# Patient Record
Sex: FEMALE | Race: WHITE
[De-identification: names, ages, dates, MRNs, and addresses within clinical notes are randomized per-mention and may not be internally consistent; named-entity substitution may affect disease eponyms.]

---

## 2017-03-09 DIAGNOSIS — I10 ESSENTIAL HYPERTENSION: Primary | ICD-10-CM

## 2017-03-09 RX ORDER — LISINOPRIL 20 MG/1
TABLET ORAL
Qty: 30 TABLET | Refills: 3 | Status: SHIPPED | OUTPATIENT
Start: 2017-03-09 | End: 2017-06-15

## 2017-04-12 ENCOUNTER — PRIOR ORIGINAL RECORDS (OUTPATIENT)
Dept: OTHER | Age: 47
End: 2017-04-12

## 2017-06-15 ENCOUNTER — OFFICE VISIT (OUTPATIENT)
Dept: FAMILY MEDICINE CLINIC | Facility: CLINIC | Age: 47
End: 2017-06-15

## 2017-06-15 VITALS
HEART RATE: 84 BPM | RESPIRATION RATE: 14 BRPM | DIASTOLIC BLOOD PRESSURE: 80 MMHG | HEIGHT: 62.8 IN | BODY MASS INDEX: 30.77 KG/M2 | SYSTOLIC BLOOD PRESSURE: 134 MMHG | WEIGHT: 173.63 LBS

## 2017-06-15 DIAGNOSIS — I10 ESSENTIAL HYPERTENSION, BENIGN: Primary | ICD-10-CM

## 2017-06-15 DIAGNOSIS — Z12.31 VISIT FOR SCREENING MAMMOGRAM: ICD-10-CM

## 2017-06-15 DIAGNOSIS — Z00.00 LABORATORY EXAMINATION ORDERED AS PART OF A ROUTINE GENERAL MEDICAL EXAMINATION: ICD-10-CM

## 2017-06-15 PROCEDURE — 99213 OFFICE O/P EST LOW 20 MIN: CPT | Performed by: FAMILY MEDICINE

## 2017-06-15 RX ORDER — LISINOPRIL 20 MG/1
20 TABLET ORAL
Qty: 90 TABLET | Refills: 3 | Status: SHIPPED | OUTPATIENT
Start: 2017-06-15 | End: 2018-06-22

## 2017-06-15 NOTE — PROGRESS NOTES
HPI:   Patient presents with:  Blood Pressure: 6 month check      Ibis Hughes is a 48year old female who presents for recheck of her hypertension. She has been taking medications as instructed, with no medication side effects.  Her home BP monitoring i and hysteroscopy. Her family history includes Cancer in her maternal grandfather; Heart Disease in her paternal grandmother; Stroke in her paternal grandfather; abdominal cancer in her father; adrenal cancer in her mother.      She is allergic to [facility] DIFFERENTIAL WITH PLATELET     TSH W REFLEX TO FREE T4     Visit for screening mammogram         Relevant Orders     MARINO SCREENING BILAT (CPT=77067)            Return in about 6 months (around 12/15/2017) for Annual physical.    Jayson Mata, 6/15/2017  9:4

## 2017-07-03 ENCOUNTER — OFFICE VISIT (OUTPATIENT)
Dept: FAMILY MEDICINE CLINIC | Facility: CLINIC | Age: 47
End: 2017-07-03

## 2017-07-03 ENCOUNTER — HOSPITAL ENCOUNTER (OUTPATIENT)
Dept: GENERAL RADIOLOGY | Age: 47
Discharge: HOME | End: 2017-07-03
Attending: PHYSICIAN ASSISTANT
Payer: COMMERCIAL

## 2017-07-03 VITALS
BODY MASS INDEX: 31 KG/M2 | WEIGHT: 173.81 LBS | HEART RATE: 84 BPM | SYSTOLIC BLOOD PRESSURE: 140 MMHG | DIASTOLIC BLOOD PRESSURE: 80 MMHG

## 2017-07-03 DIAGNOSIS — M25.511 ACUTE PAIN OF RIGHT SHOULDER: Primary | ICD-10-CM

## 2017-07-03 DIAGNOSIS — M25.511 ACUTE PAIN OF RIGHT SHOULDER: ICD-10-CM

## 2017-07-03 PROCEDURE — 99213 OFFICE O/P EST LOW 20 MIN: CPT | Performed by: PHYSICIAN ASSISTANT

## 2017-07-03 PROCEDURE — 73030 X-RAY EXAM OF SHOULDER: CPT | Performed by: PHYSICIAN ASSISTANT

## 2017-07-03 NOTE — PROGRESS NOTES
CC:  Patient presents with:  Arm Pain: at Shoulder- was riding horse on vacation - pulled arm trying to hold on to get on harness- can hardly lift right arm- 06/30/17      HPI: Lauren Jj presents for complaints of right shoulder pain and diminished ROM after Normal respirations; no cough, SOB, or wheezing  Cardiovascular: No CP or palpitations; no peripheral edema  Gastrointestinal: Normal bowels; no abdominal pain; no N/V/D/C  Genitourinary:  Normal urination; no hematuria; no frequency  Musculoskeletal: See complications, allergies, or worsening or changing symptoms. Patient is to call with any side effects or complications from the treatments as a result of today.     Reviewed Past Medical History and   Patient Active Problem List:     Essential hypertension

## 2017-07-17 ENCOUNTER — OFFICE VISIT (OUTPATIENT)
Dept: FAMILY MEDICINE CLINIC | Facility: CLINIC | Age: 47
End: 2017-07-17

## 2017-07-17 VITALS
WEIGHT: 175 LBS | BODY MASS INDEX: 31.4 KG/M2 | HEART RATE: 88 BPM | HEIGHT: 62.5 IN | SYSTOLIC BLOOD PRESSURE: 138 MMHG | DIASTOLIC BLOOD PRESSURE: 60 MMHG

## 2017-07-17 DIAGNOSIS — M75.41 IMPINGEMENT SYNDROME OF RIGHT SHOULDER: Primary | ICD-10-CM

## 2017-07-17 PROCEDURE — 99213 OFFICE O/P EST LOW 20 MIN: CPT | Performed by: PHYSICIAN ASSISTANT

## 2017-07-17 NOTE — PROGRESS NOTES
CC:  Patient presents with:  Shoulder Pain: follow up on right shoulder, condition improving, x rays to review, range of motion still slightly inhibited      HPI: Hans Brewster presents for a F/U of right shoulder pain and impingement syndrome.  She has used a sli Normocephalic, atraumatic  Ears: Normal external ears  Nose: No edema, bleeding, or rhinorrhea  Eyes: Pupils equal  Cardiovascular: Excellent peripheral perfusion  Pulmonary/Chest: No audible wheezing or labored breathing  Abdominal: No distention  Musculo

## 2017-08-03 PROBLEM — M75.41 IMPINGEMENT SYNDROME OF RIGHT SHOULDER: Status: ACTIVE | Noted: 2017-08-03

## 2017-08-09 ENCOUNTER — PRIOR ORIGINAL RECORDS (OUTPATIENT)
Dept: OTHER | Age: 47
End: 2017-08-09

## 2017-08-25 ENCOUNTER — OFFICE VISIT (OUTPATIENT)
Dept: FAMILY MEDICINE CLINIC | Facility: CLINIC | Age: 47
End: 2017-08-25

## 2017-08-25 VITALS
HEART RATE: 78 BPM | BODY MASS INDEX: 31 KG/M2 | RESPIRATION RATE: 24 BRPM | DIASTOLIC BLOOD PRESSURE: 78 MMHG | WEIGHT: 174.19 LBS | SYSTOLIC BLOOD PRESSURE: 140 MMHG

## 2017-08-25 DIAGNOSIS — M25.511 ACUTE PAIN OF RIGHT SHOULDER: Primary | ICD-10-CM

## 2017-08-25 PROCEDURE — 99213 OFFICE O/P EST LOW 20 MIN: CPT | Performed by: PHYSICIAN ASSISTANT

## 2017-08-25 NOTE — PROGRESS NOTES
CC:  Patient presents with:  Shoulder Pain: follow up on Right shoulder pain feels it is getting better with threapy      HPI: Keanu Ibanez presents for complaints of improved but not resolved right shoulder pain.  She injured her shoulder initially in July, and SOB, or wheezing  Cardiovascular: No CP or palpitations; no peripheral edema  Gastrointestinal: Normal bowels; no abdominal pain; no N/V/D/C  Genitourinary:  Normal urination; no hematuria; no frequency  Musculoskeletal: See HPI  Skin: No rashes or new ski gallbladder without mention of cholecystitis or obstruction     Anemia of chronic disease     Impingement syndrome of right shoulder      No orders of the defined types were placed in this encounter.       No prescriptions requested or ordered in this encou

## 2017-09-11 ENCOUNTER — TELEPHONE (OUTPATIENT)
Dept: FAMILY MEDICINE CLINIC | Facility: CLINIC | Age: 47
End: 2017-09-11

## 2017-09-11 DIAGNOSIS — M75.41 IMPINGEMENT SYNDROME OF RIGHT SHOULDER: Primary | ICD-10-CM

## 2017-09-11 NOTE — TELEPHONE ENCOUNTER
Pt has met most goals stated on initial evaluation; however, still concerned pt may have rotator cuff or labral abnormality.  Suggest further follow up to ensure no tears are present before we increase her ex program. -thanks Eagle Wadsworth DPT, SCS, ATC

## 2017-09-11 NOTE — TELEPHONE ENCOUNTER
From: Berhane Fernandez, PT DPT SCS ATC   Sent: 8/24/2017  10:56 AM   To: Karen Schilling PA-C     Pt has met most goals stated on initial evaluation; however, still concerned pt may have rotator cuff or labral abnormality.  Suggest further follow up to ensu

## 2017-12-06 ENCOUNTER — PRIOR ORIGINAL RECORDS (OUTPATIENT)
Dept: OTHER | Age: 47
End: 2017-12-06

## 2017-12-21 ENCOUNTER — OFFICE VISIT (OUTPATIENT)
Dept: FAMILY MEDICINE CLINIC | Facility: CLINIC | Age: 47
End: 2017-12-21

## 2017-12-21 VITALS
DIASTOLIC BLOOD PRESSURE: 80 MMHG | WEIGHT: 173 LBS | HEIGHT: 62.5 IN | SYSTOLIC BLOOD PRESSURE: 126 MMHG | TEMPERATURE: 99 F | BODY MASS INDEX: 31.04 KG/M2 | HEART RATE: 76 BPM | RESPIRATION RATE: 14 BRPM

## 2017-12-21 DIAGNOSIS — Z12.31 VISIT FOR SCREENING MAMMOGRAM: ICD-10-CM

## 2017-12-21 DIAGNOSIS — I10 ESSENTIAL HYPERTENSION, BENIGN: ICD-10-CM

## 2017-12-21 DIAGNOSIS — D63.8 ANEMIA OF CHRONIC DISEASE: ICD-10-CM

## 2017-12-21 DIAGNOSIS — Z00.00 ANNUAL PHYSICAL EXAM: Primary | ICD-10-CM

## 2017-12-21 DIAGNOSIS — C92.10 CHRONIC MYELOID LEUKEMIA (HCC): ICD-10-CM

## 2017-12-21 PROCEDURE — 99396 PREV VISIT EST AGE 40-64: CPT | Performed by: FAMILY MEDICINE

## 2017-12-21 NOTE — PROGRESS NOTES
Unique Keenan is a 50year old female who presents for a complete physical exam.   HPI:   Patient presents with:  Physical: Annual physical no pap patient has own OBGYN.  Pt needs mammogram order     Her last annual assessment has been over 1 year: Annual (two) times daily. lisinopril 20 MG Oral Tab Take 1 tablet (20 mg total) by mouth once daily. ibuprofen (MOTRIN IB) 200 MG Oral Tab Take  by mouth. Multiple Vitamins-Minerals (CENTRUM OR) Take  by mouth.      No current facility-administered medicatio 76   Temp 98.6 °F (37 °C)   Resp 14   Ht 62.5\"   Wt 173 lb   LMP 12/10/2017 (Approximate)   BMI 31.14 kg/m²  Estimated body mass index is 31.14 kg/m² as calculated from the following:    Height as of this encounter: 62.5\".     Weight as of this encounter: intact. No rash noted. She is not diaphoretic. No cyanosis or erythema. Nails show no clubbing. Psychiatric: She has a normal mood and affect.  Her speech is normal and behavior is normal. Judgment and thought content normal. Cognition and memory are norm

## 2017-12-21 NOTE — ASSESSMENT & PLAN NOTE
Stable, Continue present management.     Blood Pressure and Cardiac Medications          lisinopril 20 MG Oral Tab

## 2017-12-31 ENCOUNTER — PRIOR ORIGINAL RECORDS (OUTPATIENT)
Dept: OTHER | Age: 47
End: 2017-12-31

## 2018-04-11 ENCOUNTER — PRIOR ORIGINAL RECORDS (OUTPATIENT)
Dept: OTHER | Age: 48
End: 2018-04-11

## 2018-06-22 DIAGNOSIS — I10 ESSENTIAL HYPERTENSION, BENIGN: ICD-10-CM

## 2018-06-22 RX ORDER — LISINOPRIL 20 MG/1
20 TABLET ORAL
Qty: 90 TABLET | Refills: 1 | Status: SHIPPED | OUTPATIENT
Start: 2018-06-22 | End: 2019-04-16

## 2018-08-15 ENCOUNTER — PRIOR ORIGINAL RECORDS (OUTPATIENT)
Dept: OTHER | Age: 48
End: 2018-08-15

## 2018-09-12 ENCOUNTER — OFFICE VISIT (OUTPATIENT)
Dept: FAMILY MEDICINE CLINIC | Facility: CLINIC | Age: 48
End: 2018-09-12
Payer: COMMERCIAL

## 2018-09-12 VITALS
HEIGHT: 62.7 IN | WEIGHT: 175.19 LBS | HEART RATE: 72 BPM | BODY MASS INDEX: 31.43 KG/M2 | DIASTOLIC BLOOD PRESSURE: 80 MMHG | SYSTOLIC BLOOD PRESSURE: 120 MMHG | RESPIRATION RATE: 14 BRPM | TEMPERATURE: 99 F

## 2018-09-12 DIAGNOSIS — I10 ESSENTIAL HYPERTENSION, BENIGN: Primary | ICD-10-CM

## 2018-09-12 DIAGNOSIS — Z12.31 VISIT FOR SCREENING MAMMOGRAM: ICD-10-CM

## 2018-09-12 DIAGNOSIS — Z00.00 LABORATORY EXAMINATION ORDERED AS PART OF A ROUTINE GENERAL MEDICAL EXAMINATION: ICD-10-CM

## 2018-09-12 PROCEDURE — 90471 IMMUNIZATION ADMIN: CPT | Performed by: FAMILY MEDICINE

## 2018-09-12 PROCEDURE — 90686 IIV4 VACC NO PRSV 0.5 ML IM: CPT | Performed by: FAMILY MEDICINE

## 2018-09-12 PROCEDURE — 99213 OFFICE O/P EST LOW 20 MIN: CPT | Performed by: FAMILY MEDICINE

## 2018-09-12 RX ORDER — IMATINIB MESYLATE 100 MG/1
300 TABLET, FILM COATED ORAL
COMMUNITY
End: 2020-07-17

## 2018-09-12 NOTE — PROGRESS NOTES
HPI:   Patient presents with:  HTN: BP check  Imm/Inj: fu shots    Subjective   Unique Keenan is a 46year old female who presents for recheck of her hypertension. She has been taking medications as instructed, with no medication side effects.  Her home B Nursing note and vitals reviewed. Constitutional: She is oriented to person, place, and time. She appears well-developed and well-nourished. No distress. HENT:   Head: Normocephalic. Neck: Normal range of motion.    Cardiovascular: Normal rate, regu LIPID PANEL    COMP METABOLIC PANEL (14)           Return in about 1 year (around 9/12/2019) for recheck.     Evaristo Self, 9/12/2018  12:30 PM

## 2018-12-12 ENCOUNTER — PRIOR ORIGINAL RECORDS (OUTPATIENT)
Dept: OTHER | Age: 48
End: 2018-12-12

## 2018-12-31 ENCOUNTER — PRIOR ORIGINAL RECORDS (OUTPATIENT)
Dept: OTHER | Age: 48
End: 2018-12-31

## 2019-01-19 ENCOUNTER — HOSPITAL ENCOUNTER (OUTPATIENT)
Dept: MAMMOGRAPHY | Age: 49
Discharge: HOME | End: 2019-01-19
Attending: FAMILY MEDICINE
Payer: COMMERCIAL

## 2019-01-19 DIAGNOSIS — Z12.31 VISIT FOR SCREENING MAMMOGRAM: ICD-10-CM

## 2019-01-19 PROCEDURE — 77067 SCR MAMMO BI INCL CAD: CPT | Performed by: FAMILY MEDICINE

## 2019-01-19 PROCEDURE — 77063 BREAST TOMOSYNTHESIS BI: CPT | Performed by: FAMILY MEDICINE

## 2019-04-15 DIAGNOSIS — I10 ESSENTIAL HYPERTENSION, BENIGN: ICD-10-CM

## 2019-04-15 NOTE — TELEPHONE ENCOUNTER
Faxed request from [facility] for refill on Lisinopril. LOV 9/12/18 and to f/u in 1 year but last labs 10/1/16.   Labs look like they were ordered in 2017 and 2018 but I do not see that the labs were done  Message left at home # to call back  Cell # - no answer

## 2019-04-16 RX ORDER — LISINOPRIL 20 MG/1
20 TABLET ORAL
Qty: 90 TABLET | Refills: 0 | Status: SHIPPED | OUTPATIENT
Start: 2019-04-16 | End: 2019-06-14

## 2019-04-16 NOTE — TELEPHONE ENCOUNTER
Pt states that her  has changed jobs and she is trying to straighten out insurance. She states that she has appt with Dr Adrianne Jimenez on 5/24/19 and she will get her labs done at that time.  She will also have labs done for Dr Adrianne Jimenez    She does not have

## 2019-04-24 ENCOUNTER — PRIOR ORIGINAL RECORDS (OUTPATIENT)
Dept: OTHER | Age: 49
End: 2019-04-24

## 2019-06-14 ENCOUNTER — OFFICE VISIT (OUTPATIENT)
Dept: FAMILY MEDICINE CLINIC | Facility: CLINIC | Age: 49
End: 2019-06-14
Payer: COMMERCIAL

## 2019-06-14 VITALS
WEIGHT: 173 LBS | HEART RATE: 80 BPM | SYSTOLIC BLOOD PRESSURE: 132 MMHG | RESPIRATION RATE: 14 BRPM | BODY MASS INDEX: 31.04 KG/M2 | DIASTOLIC BLOOD PRESSURE: 70 MMHG | HEIGHT: 62.5 IN

## 2019-06-14 DIAGNOSIS — I10 ESSENTIAL HYPERTENSION, BENIGN: Primary | ICD-10-CM

## 2019-06-14 DIAGNOSIS — E78.6 LOW HDL (UNDER 40): ICD-10-CM

## 2019-06-14 DIAGNOSIS — Z12.11 SCREEN FOR COLON CANCER: ICD-10-CM

## 2019-06-14 PROCEDURE — 99213 OFFICE O/P EST LOW 20 MIN: CPT | Performed by: FAMILY MEDICINE

## 2019-06-14 RX ORDER — LISINOPRIL 20 MG/1
20 TABLET ORAL
Qty: 90 TABLET | Refills: 3 | Status: SHIPPED | OUTPATIENT
Start: 2019-06-14 | End: 2020-06-09

## 2019-06-14 NOTE — PROGRESS NOTES
HPI:   Patient presents with:  Blood Pressure: f/u   Health Maintenance: wants to discuss options for colon cancer screening    Subjective   Julio Cesar Scott is a 46year old female who presents for recheck of her hypertension.  She has been taking medicatio Normal range of motion. Neck supple. Cardiovascular: Normal rate, regular rhythm and normal heart sounds. No murmur heard. Pulmonary/Chest: Effort normal and breath sounds normal. No respiratory distress.    Neurological: She is alert and oriented to

## 2019-08-14 ENCOUNTER — PRIOR ORIGINAL RECORDS (OUTPATIENT)
Dept: OTHER | Age: 49
End: 2019-08-14

## 2019-10-23 ENCOUNTER — PRIOR ORIGINAL RECORDS (OUTPATIENT)
Dept: OTHER | Age: 49
End: 2019-10-23

## 2019-10-29 ENCOUNTER — TELEPHONE (OUTPATIENT)
Dept: FAMILY MEDICINE CLINIC | Facility: CLINIC | Age: 49
End: 2019-10-29

## 2019-10-29 NOTE — TELEPHONE ENCOUNTER
Eliz at [facility] case management requesting to speak with nurse regarding no recent  colonoscopy, physical and pap. Also, if can get a recent med rec.

## 2019-12-31 ENCOUNTER — PRIOR ORIGINAL RECORDS (OUTPATIENT)
Dept: OTHER | Age: 49
End: 2019-12-31

## 2020-02-19 ENCOUNTER — HOSPITAL (OUTPATIENT)
Dept: OTHER | Age: 50
End: 2020-02-19

## 2020-02-21 ENCOUNTER — PRIOR ORIGINAL RECORDS (OUTPATIENT)
Dept: OTHER | Age: 50
End: 2020-02-21

## 2020-02-21 PROCEDURE — 99212 OFFICE O/P EST SF 10 MIN: CPT | Performed by: INTERNAL MEDICINE

## 2020-03-01 ENCOUNTER — HOSPITAL (OUTPATIENT)
Dept: OTHER | Age: 50
End: 2020-03-01

## 2020-04-01 ENCOUNTER — HOSPITAL (OUTPATIENT)
Dept: OTHER | Age: 50
End: 2020-04-01

## 2020-05-01 ENCOUNTER — HOSPITAL (OUTPATIENT)
Dept: OTHER | Age: 50
End: 2020-05-01

## 2020-06-01 ENCOUNTER — HOSPITAL (OUTPATIENT)
Dept: OTHER | Age: 50
End: 2020-06-01

## 2020-06-03 DIAGNOSIS — I10 ESSENTIAL HYPERTENSION, BENIGN: ICD-10-CM

## 2020-06-03 NOTE — TELEPHONE ENCOUNTER
Refill request for Lisinopril fails protocol because LOV was 6/4/2019 and at that time, pt was advised to follow up in 3 months. No future appointments. Also, last labs were completed 4/24/2019. There are no pending labs ordered.     Lisinopril was las

## 2020-06-04 NOTE — TELEPHONE ENCOUNTER
No future appointments. LOV 6/14/2019. Please arrange follow up visit to discuss.  Thanks and stay well, Karen Calix MD

## 2020-06-09 RX ORDER — LISINOPRIL 20 MG/1
20 TABLET ORAL
Qty: 90 TABLET | Refills: 0 | Status: SHIPPED | OUTPATIENT
Start: 2020-06-09 | End: 2020-07-17

## 2020-06-09 NOTE — TELEPHONE ENCOUNTER
Pt scheduled appt for 7/17/20 with Dr. Kenzie Bacon and she has enough medication to get to that appt.

## 2020-06-26 ENCOUNTER — PRIOR ORIGINAL RECORDS (OUTPATIENT)
Dept: OTHER | Age: 50
End: 2020-06-26

## 2020-06-26 PROCEDURE — 99212 OFFICE O/P EST SF 10 MIN: CPT | Performed by: INTERNAL MEDICINE

## 2020-07-01 ENCOUNTER — HOSPITAL (OUTPATIENT)
Dept: OTHER | Age: 50
End: 2020-07-01
Attending: INTERNAL MEDICINE

## 2020-07-17 ENCOUNTER — OFFICE VISIT (OUTPATIENT)
Dept: FAMILY MEDICINE CLINIC | Facility: CLINIC | Age: 50
End: 2020-07-17
Payer: COMMERCIAL

## 2020-07-17 VITALS
WEIGHT: 174 LBS | HEIGHT: 62.5 IN | RESPIRATION RATE: 20 BRPM | DIASTOLIC BLOOD PRESSURE: 74 MMHG | TEMPERATURE: 96 F | SYSTOLIC BLOOD PRESSURE: 160 MMHG | BODY MASS INDEX: 31.22 KG/M2 | HEART RATE: 84 BPM

## 2020-07-17 DIAGNOSIS — E78.6 LOW HDL (UNDER 40): ICD-10-CM

## 2020-07-17 DIAGNOSIS — D63.8 ANEMIA OF CHRONIC DISEASE: ICD-10-CM

## 2020-07-17 DIAGNOSIS — Z00.00 LABORATORY EXAMINATION ORDERED AS PART OF A ROUTINE GENERAL MEDICAL EXAMINATION: ICD-10-CM

## 2020-07-17 DIAGNOSIS — Z12.31 VISIT FOR SCREENING MAMMOGRAM: ICD-10-CM

## 2020-07-17 DIAGNOSIS — I10 ESSENTIAL HYPERTENSION, BENIGN: Primary | ICD-10-CM

## 2020-07-17 PROCEDURE — 99214 OFFICE O/P EST MOD 30 MIN: CPT | Performed by: FAMILY MEDICINE

## 2020-07-17 PROCEDURE — 3077F SYST BP >= 140 MM HG: CPT | Performed by: FAMILY MEDICINE

## 2020-07-17 PROCEDURE — 3078F DIAST BP <80 MM HG: CPT | Performed by: FAMILY MEDICINE

## 2020-07-17 PROCEDURE — 3008F BODY MASS INDEX DOCD: CPT | Performed by: FAMILY MEDICINE

## 2020-07-17 RX ORDER — IMATINIB MESYLATE 100 MG/1
300 TABLET, FILM COATED ORAL 2 TIMES DAILY
COMMUNITY

## 2020-07-17 RX ORDER — LISINOPRIL 40 MG/1
40 TABLET ORAL DAILY
Qty: 90 TABLET | Refills: 3 | Status: SHIPPED | OUTPATIENT
Start: 2020-07-17 | End: 2021-07-09

## 2020-07-17 NOTE — ASSESSMENT & PLAN NOTE
Increase dose, repeat in 3 months, video visit ok  Blood Pressure and Cardiac Medications          lisinopril 40 MG Oral Tab

## 2020-07-17 NOTE — PROGRESS NOTES
Patient presents with:  Blood Pressure  Testing: Pt due for physical/pap/colonoscopy/mammo      Subjective   HPI:   This is a 44year old female coming in for HTN, recent recurrence of CML managed by Dr James Grant, now better.      HPI   See reviewed tab for PMS affect.  Her speech is normal and behavior is normal. Judgment and thought content normal. Cognition and memory are normal.            Assessment and Plan:     Problem List Items Addressed This Visit        Cardiovascular    Essential hypertension, benign -

## 2020-08-01 ENCOUNTER — HOSPITAL (OUTPATIENT)
Dept: OTHER | Age: 50
End: 2020-08-01
Attending: INTERNAL MEDICINE

## 2020-08-26 ENCOUNTER — OFFICE VISIT (OUTPATIENT)
Dept: FAMILY MEDICINE CLINIC | Facility: CLINIC | Age: 50
End: 2020-08-26
Payer: COMMERCIAL

## 2020-08-26 ENCOUNTER — APPOINTMENT (OUTPATIENT)
Dept: CT IMAGING | Facility: HOSPITAL | Age: 50
End: 2020-08-26
Attending: EMERGENCY MEDICINE
Payer: COMMERCIAL

## 2020-08-26 ENCOUNTER — APPOINTMENT (OUTPATIENT)
Dept: GENERAL RADIOLOGY | Age: 50
End: 2020-08-26
Attending: EMERGENCY MEDICINE
Payer: COMMERCIAL

## 2020-08-26 ENCOUNTER — HOSPITAL ENCOUNTER (EMERGENCY)
Facility: HOSPITAL | Age: 50
Discharge: HOME | End: 2020-08-26
Attending: EMERGENCY MEDICINE
Payer: COMMERCIAL

## 2020-08-26 VITALS
HEART RATE: 68 BPM | RESPIRATION RATE: 18 BRPM | TEMPERATURE: 99 F | BODY MASS INDEX: 31.83 KG/M2 | SYSTOLIC BLOOD PRESSURE: 133 MMHG | HEIGHT: 62 IN | WEIGHT: 173 LBS | DIASTOLIC BLOOD PRESSURE: 76 MMHG | OXYGEN SATURATION: 96 %

## 2020-08-26 DIAGNOSIS — I48.0 PAROXYSMAL ATRIAL FIBRILLATION (HCC): Primary | ICD-10-CM

## 2020-08-26 DIAGNOSIS — Z02.9 ENCOUNTER FOR ADMINISTRATIVE EXAMINATIONS: Primary | ICD-10-CM

## 2020-08-26 LAB
ALBUMIN SERPL-MCNC: 4.4 G/DL (ref 3.4–5)
ALBUMIN/GLOB SERPL: 1.5 {RATIO} (ref 1–2)
ALP LIVER SERPL-CCNC: 81 U/L (ref 41–108)
ALT SERPL-CCNC: 28 U/L (ref 13–56)
ANION GAP SERPL CALC-SCNC: 7 MMOL/L (ref 0–18)
APTT PPP: 24.8 SECONDS (ref 25.4–36.1)
AST SERPL-CCNC: 30 U/L (ref 15–37)
BASOPHILS # BLD AUTO: 0.06 X10(3) UL (ref 0–0.2)
BASOPHILS NFR BLD AUTO: 0.5 %
BILIRUB SERPL-MCNC: 0.3 MG/DL (ref 0.1–2)
BUN BLD-MCNC: 16 MG/DL (ref 7–18)
BUN/CREAT SERPL: 13.2 (ref 10–20)
CALCIUM BLD-MCNC: 9 MG/DL (ref 8.5–10.1)
CHLORIDE SERPL-SCNC: 108 MMOL/L (ref 98–112)
CO2 SERPL-SCNC: 28 MMOL/L (ref 21–32)
CREAT BLD-MCNC: 1.21 MG/DL (ref 0.55–1.02)
D-DIMER: 2.4 UG/ML FEU (ref ?–0.52)
DEPRECATED RDW RBC AUTO: 49.9 FL (ref 35.1–46.3)
EOSINOPHIL # BLD AUTO: 1.39 X10(3) UL (ref 0–0.7)
EOSINOPHIL NFR BLD AUTO: 10.5 %
ERYTHROCYTE [DISTWIDTH] IN BLOOD BY AUTOMATED COUNT: 14 % (ref 11–15)
GLOBULIN PLAS-MCNC: 3 G/DL (ref 2.8–4.4)
GLUCOSE BLD-MCNC: 120 MG/DL (ref 70–99)
HCT VFR BLD AUTO: 37.3 % (ref 35–48)
HGB BLD-MCNC: 12.8 G/DL (ref 12–16)
IMM GRANULOCYTES # BLD AUTO: 0.05 X10(3) UL (ref 0–1)
IMM GRANULOCYTES NFR BLD: 0.4 %
INR BLD: 0.94 (ref 0.88–1.11)
LYMPHOCYTES # BLD AUTO: 2.22 X10(3) UL (ref 1–4)
LYMPHOCYTES NFR BLD AUTO: 16.7 %
M PROTEIN MFR SERPL ELPH: 7.4 G/DL (ref 6.4–8.2)
MCH RBC QN AUTO: 33.1 PG (ref 26–34)
MCHC RBC AUTO-ENTMCNC: 34.3 G/DL (ref 31–37)
MCV RBC AUTO: 96.4 FL (ref 80–100)
MONOCYTES # BLD AUTO: 0.85 X10(3) UL (ref 0.1–1)
MONOCYTES NFR BLD AUTO: 6.4 %
NEUTROPHILS # BLD AUTO: 8.7 X10 (3) UL (ref 1.5–7.7)
NEUTROPHILS # BLD AUTO: 8.7 X10(3) UL (ref 1.5–7.7)
NEUTROPHILS NFR BLD AUTO: 65.5 %
OSMOLALITY SERPL CALC.SUM OF ELEC: 298 MOSM/KG (ref 275–295)
PLATELET # BLD AUTO: 227 10(3)UL (ref 150–450)
POTASSIUM SERPL-SCNC: 3.3 MMOL/L (ref 3.5–5.1)
PSA SERPL DL<=0.01 NG/ML-MCNC: 12.4 SECONDS (ref 12–14.3)
RBC # BLD AUTO: 3.87 X10(6)UL (ref 3.8–5.3)
SODIUM SERPL-SCNC: 143 MMOL/L (ref 136–145)
TROPONIN I SERPL-MCNC: <0.045 NG/ML (ref ?–0.04)
TSI SER-ACNC: 2.28 MIU/ML (ref 0.36–3.74)
WBC # BLD AUTO: 13.3 X10(3) UL (ref 4–11)

## 2020-08-26 PROCEDURE — 85730 THROMBOPLASTIN TIME PARTIAL: CPT | Performed by: EMERGENCY MEDICINE

## 2020-08-26 PROCEDURE — 85379 FIBRIN DEGRADATION QUANT: CPT | Performed by: EMERGENCY MEDICINE

## 2020-08-26 PROCEDURE — 84443 ASSAY THYROID STIM HORMONE: CPT | Performed by: EMERGENCY MEDICINE

## 2020-08-26 PROCEDURE — 99284 EMERGENCY DEPT VISIT MOD MDM: CPT

## 2020-08-26 PROCEDURE — 80053 COMPREHEN METABOLIC PANEL: CPT | Performed by: EMERGENCY MEDICINE

## 2020-08-26 PROCEDURE — 36415 COLL VENOUS BLD VENIPUNCTURE: CPT

## 2020-08-26 PROCEDURE — 99285 EMERGENCY DEPT VISIT HI MDM: CPT

## 2020-08-26 PROCEDURE — 71045 X-RAY EXAM CHEST 1 VIEW: CPT | Performed by: EMERGENCY MEDICINE

## 2020-08-26 PROCEDURE — 71260 CT THORAX DX C+: CPT | Performed by: EMERGENCY MEDICINE

## 2020-08-26 PROCEDURE — 85025 COMPLETE CBC W/AUTO DIFF WBC: CPT | Performed by: EMERGENCY MEDICINE

## 2020-08-26 PROCEDURE — 84484 ASSAY OF TROPONIN QUANT: CPT | Performed by: EMERGENCY MEDICINE

## 2020-08-26 PROCEDURE — 85610 PROTHROMBIN TIME: CPT | Performed by: EMERGENCY MEDICINE

## 2020-08-26 PROCEDURE — 93005 ELECTROCARDIOGRAM TRACING: CPT

## 2020-08-27 ENCOUNTER — ANCILLARY ORDERS (OUTPATIENT)
Dept: CARDIOLOGY | Age: 50
End: 2020-08-27

## 2020-08-27 ENCOUNTER — ANCILLARY PROCEDURE (OUTPATIENT)
Dept: CARDIOLOGY | Age: 50
End: 2020-08-27
Attending: INTERNAL MEDICINE

## 2020-08-27 DIAGNOSIS — I48.91 ATRIAL FIBRILLATION, UNSPECIFIED TYPE (CMD): ICD-10-CM

## 2020-08-27 LAB
ATRIAL RATE: 113 BPM
P AXIS: 40 DEGREES
P-R INTERVAL: 156 MS
Q-T INTERVAL: 322 MS
QRS DURATION: 84 MS
QTC CALCULATION (BEZET): 441 MS
R AXIS: 39 DEGREES
T AXIS: 14 DEGREES
VENTRICULAR RATE: 113 BPM

## 2020-08-27 NOTE — ED PROVIDER NOTES
Patient Seen in: THE [facility] Emergency Department In [place]      History   Patient presents with:  Arrythmia/Palpitations    Stated Complaint: palpitations     HPI    57-year-old with past medical history of controlled CML, hypertension presents today for Other systems are as noted in HPI. Constitutional and vital signs reviewed. All other systems reviewed and negative except as noted above.     Physical Exam     ED Triage Vitals [08/26/20 1948]   BP (!) 162/92   Pulse 109   Resp 14   Temp 98.9 °F (3 All other components within normal limits   PTT, ACTIVATED - Abnormal; Notable for the following components:    PTT 24.8 (*)     All other components within normal limits   CBC W/ DIFFERENTIAL - Abnormal; Notable for the following components:    WBC 13.3 ( rhythm, however reports feeling much better. Will send troponin to assess for cardiac ischemia. Plan for lites to assess for metabolic abnormality. Will send TSH. Plan for d-dimer to assess for pulmonary embolism.   Will get chest x-ray to assess for co and Plan     Clinical Impression:  Paroxysmal atrial fibrillation ([facility] [address]  (primary encounter diagnosis)    Disposition:  Transfer to another facility  8/26/2020  9:25 pm    Follow-up:  Velia Diaz MD  [phone] [phone]

## 2020-08-27 NOTE — ED PROVIDER NOTES
Ms. Bright Gerry  and was endorsed to myself for a CT chest.  She has paroxysmal A. fib we will start an aspirin and a beta-blocker. Due to elevated d-dimer she was referred here to have a CTA chest to rule out PE. Patient informed of CT image findings.     CT

## 2020-08-27 NOTE — PROGRESS NOTES
Pt presented with increased heart rate, dizziness and high blood pressure 870'F systolic at home. Called doctors office and answering service message directed her to the [facility]    There were no vitals taken for this visit.     Accompanied by:   Koko ca

## 2020-08-27 NOTE — ED INITIAL ASSESSMENT (HPI)
PT to the ED for evaluation of palpitations. PT states she felt her heart was racing, got lightheaded and had heartburn last night. It lasted for an hour and then stopped. PT states tonight at 1800 the palpitations returned.  Denies CP, dizziness and heartb

## 2020-08-27 NOTE — ED NOTES
PT appeared to be in a-fib with RVR upon arrival. HR was 185 and irregular. During IV stick, PT appears to have converted.  and sinus.

## 2020-09-01 ENCOUNTER — HOSPITAL (OUTPATIENT)
Dept: OTHER | Age: 50
End: 2020-09-01
Attending: INTERNAL MEDICINE

## 2020-09-03 ENCOUNTER — OFFICE VISIT (OUTPATIENT)
Dept: FAMILY MEDICINE CLINIC | Facility: CLINIC | Age: 50
End: 2020-09-03
Payer: COMMERCIAL

## 2020-09-03 VITALS
HEIGHT: 63.39 IN | BODY MASS INDEX: 31.06 KG/M2 | RESPIRATION RATE: 16 BRPM | SYSTOLIC BLOOD PRESSURE: 128 MMHG | WEIGHT: 177.5 LBS | HEART RATE: 70 BPM | DIASTOLIC BLOOD PRESSURE: 76 MMHG | TEMPERATURE: 98 F

## 2020-09-03 DIAGNOSIS — I10 ESSENTIAL HYPERTENSION, BENIGN: ICD-10-CM

## 2020-09-03 DIAGNOSIS — C92.10 CHRONIC MYELOID LEUKEMIA (HCC): ICD-10-CM

## 2020-09-03 DIAGNOSIS — E04.1 THYROID NODULE: ICD-10-CM

## 2020-09-03 DIAGNOSIS — I48.91 ATRIAL FIBRILLATION, UNSPECIFIED TYPE (HCC): Primary | ICD-10-CM

## 2020-09-03 PROCEDURE — 3074F SYST BP LT 130 MM HG: CPT | Performed by: NURSE PRACTITIONER

## 2020-09-03 PROCEDURE — 99214 OFFICE O/P EST MOD 30 MIN: CPT | Performed by: NURSE PRACTITIONER

## 2020-09-03 PROCEDURE — 3008F BODY MASS INDEX DOCD: CPT | Performed by: NURSE PRACTITIONER

## 2020-09-03 PROCEDURE — 3078F DIAST BP <80 MM HG: CPT | Performed by: NURSE PRACTITIONER

## 2020-09-03 RX ORDER — METOPROLOL SUCCINATE 25 MG/1
TABLET, EXTENDED RELEASE ORAL
COMMUNITY
Start: 2020-08-27 | End: 2020-09-03

## 2020-09-03 RX ORDER — METOPROLOL SUCCINATE 25 MG/1
25 TABLET, EXTENDED RELEASE ORAL DAILY
Qty: 90 TABLET | Refills: 0 | Status: SHIPPED | OUTPATIENT
Start: 2020-09-03

## 2020-09-03 RX ORDER — ASPIRIN 325 MG
325 TABLET ORAL DAILY
Qty: 1 TABLET | Refills: 0 | COMMUNITY
Start: 2020-09-03

## 2020-09-03 NOTE — PROGRESS NOTES
Patient presents with:  Er F/u: was seen in the ER on 8/26, was told she was in Afib       HPI:  Presents for follow up of ER visit on 8/26 for palpitations and some SOB. ER visit notes and testing reviewed by me.  Was found to be in a-fib with spontaneous mellitus    • H/O mammogram 4/12/12    BENIGN FINDINGS   • Pap smear for cervical cancer screening 3/30/2012    wnl neg hpv   • Peptic ulcer    • Art teeth extracted        Patient Active Problem List:     Essential hypertension, benign     Chronic myel understanding of instructions and agreeable to this plan of care. Essential hypertension, benign- good control both at home and in office today. Continue both lisinopril and metoprolol. ER warning as above.  Verbalized understanding of instructions and

## 2020-09-08 PROCEDURE — 93268 ECG RECORD/REVIEW: CPT | Performed by: INTERNAL MEDICINE

## 2020-09-20 ENCOUNTER — HOSPITAL ENCOUNTER (OUTPATIENT)
Dept: ULTRASOUND IMAGING | Age: 50
Discharge: HOME | End: 2020-09-20
Attending: INTERNAL MEDICINE
Payer: COMMERCIAL

## 2020-09-20 DIAGNOSIS — C92.10 CHRONIC MYELOID LEUKEMIA (HCC): ICD-10-CM

## 2020-09-20 PROCEDURE — 76536 US EXAM OF HEAD AND NECK: CPT | Performed by: INTERNAL MEDICINE

## 2020-10-09 LAB
% SATURATION: 26 % (CALC) (ref 11–50)
% SATURATION: 27 % (CALC) (ref 16–45)
% SATURATION: 31 % (CALC) (ref 11–50)
ALBUMIN/GLOB SERPL: 2.1 (CALC) (ref 1–2.5)
ALBUMIN/GLOB SERPL: 2.2 (CALC) (ref 1–2.5)
ALBUMIN/GLOB SERPL: 2.3 (CALC) (ref 1–2.5)
ALBUMIN: 4.1 G/DL (ref 3.6–5.1)
ALBUMIN: 4.2 G/DL (ref 3.6–5.1)
ALBUMIN: 4.3 G/DL (ref 3.6–5.1)
ALBUMIN: 4.3 G/DL (ref 3.6–5.1)
ALBUMIN: 4.4 G/DL (ref 3.6–5.1)
ALKALINE PHOSPHATASE: 59 UNIT/L (ref 33–115)
ALKALINE PHOSPHATASE: 61 UNIT/L (ref 33–130)
ALKALINE PHOSPHATASE: 64 UNIT/L (ref 33–115)
ALKALINE PHOSPHATASE: 64 UNIT/L (ref 33–130)
ALKALINE PHOSPHATASE: 65 UNIT/L (ref 33–115)
ALKALINE PHOSPHATASE: 65 UNIT/L (ref 33–115)
ALKALINE PHOSPHATASE: 65 UNIT/L (ref 33–130)
ALKALINE PHOSPHATASE: 67 UNIT/L (ref 33–130)
ALT: 12 UNIT/L (ref 6–29)
ALT: 13 UNIT/L (ref 6–29)
ALT: 13 UNIT/L (ref 6–29)
ALT: 14 UNIT/L (ref 6–29)
ALT: 14 UNIT/L (ref 6–29)
ALT: 16 UNIT/L (ref 6–29)
ALT: 16 UNIT/L (ref 6–29)
ALT: 20 UNIT/L (ref 6–29)
AST: 18 UNIT/L (ref 10–35)
AST: 19 UNIT/L (ref 10–35)
AST: 20 UNIT/L (ref 10–35)
AST: 20 UNIT/L (ref 10–35)
AST: 22 UNIT/L (ref 10–35)
AST: 23 UNIT/L (ref 10–35)
AST: 24 UNIT/L (ref 10–35)
AST: 24 UNIT/L (ref 10–35)
BASO%: 0.3 %
BASO%: 0.4 %
BASO%: 0.4 %
BASO%: 0.5 %
BASO: 0 10^3/UL
BCR ABL1/ABL1 % (IS): 0
BCR ABL1/ABL1 %: 0
BILIRUBIN, TOTAL: 0.3 MG/DL (ref 0.2–1.2)
BILIRUBIN, TOTAL: 0.4 MG/DL (ref 0.2–1.2)
BILIRUBIN, TOTAL: 0.5 MG/DL (ref 0.2–1.2)
BILIRUBIN, TOTAL: 0.5 MG/DL (ref 0.2–1.2)
BUN/CREATININE RATIO: 13 (CALC) (ref 6–22)
BUN/CREATININE RATIO: 13 (CALC) (ref 6–22)
BUN/CREATININE RATIO: ABNORMAL (CALC) (ref 6–22)
BUN/CREATININE RATIO: NORMAL (CALC) (ref 6–22)
BUN/CREATININE RATIO: NORMAL (CALC) (ref 6–22)
CALCIUM: 8.7 MG/DL (ref 8.6–10.2)
CALCIUM: 8.7 MG/DL (ref 8.6–10.4)
CALCIUM: 8.7 MG/DL (ref 8.6–10.4)
CALCIUM: 8.8 MG/DL (ref 8.6–10.2)
CALCIUM: 8.9 MG/DL (ref 8.6–10.2)
CALCIUM: 8.9 MG/DL (ref 8.6–10.2)
CALCIUM: 8.9 MG/DL (ref 8.6–10.4)
CALCIUM: 8.9 MG/DL (ref 8.6–10.4)
CARBON DIOXIDE: 23 MMOL/L (ref 20–32)
CARBON DIOXIDE: 24 MMOL/L (ref 20–31)
CARBON DIOXIDE: 24 MMOL/L (ref 20–31)
CARBON DIOXIDE: 25 MMOL/L (ref 20–31)
CARBON DIOXIDE: 25 MMOL/L (ref 20–32)
CARBON DIOXIDE: 26 MMOL/L (ref 20–32)
CARBON DIOXIDE: 26 MMOL/L (ref 20–32)
CARBON DIOXIDE: 27 MMOL/L (ref 20–31)
CHLORIDE: 105 MMOL/L (ref 98–110)
CHLORIDE: 106 MMOL/L (ref 98–110)
CHLORIDE: 107 MMOL/L (ref 98–110)
CHOL/HDLC RATIO: 4.2 (CALC)
CHOLESTEROL, TOTAL: 127 MG/DL
CRCL (C&G) (MOSAIQ HL): 74.04 ML/MIN
CRCL (C&G) (MOSAIQ HL): 77.92 ML/MIN
CRCL (C&G) (MOSAIQ HL): 81.1 ML/MIN
CRCL (C&G) (MOSAIQ HL): 81.64 ML/MIN
CRCL (C&G) (MOSAIQ HL): 81.68 ML/MIN
CRCL (C&G) (MOSAIQ HL): 82.55 ML/MIN
CRCL (C&G) (MOSAIQ HL): 88.23 ML/MIN
CRCL (C&G) (MOSAIQ HL): 92.16 ML/MIN
CREATININE CLEARANCE (MOSAIQ HL): 58.8 ML/MIN
CREATININE CLEARANCE (MOSAIQ HL): 62.2 ML/MIN
CREATININE CLEARANCE (MOSAIQ HL): 64 ML/MIN
CREATININE CLEARANCE (MOSAIQ HL): 64.1 ML/MIN
CREATININE CLEARANCE (MOSAIQ HL): 64.8 ML/MIN
CREATININE CLEARANCE (MOSAIQ HL): 65.9 ML/MIN
CREATININE CLEARANCE (MOSAIQ HL): 70.9 ML/MIN
CREATININE CLEARANCE (MOSAIQ HL): 73.2 ML/MIN
CREATININE: 0.92 MG/DL (ref 0.5–1.1)
CREATININE: 0.95 MG/DL (ref 0.5–1.1)
CREATININE: 1.01 MG/DL (ref 0.5–1.05)
CREATININE: 1.04 MG/DL (ref 0.5–1.05)
CREATININE: 1.05 MG/DL (ref 0.5–1.1)
CREATININE: 1.05 MG/DL (ref 0.5–1.1)
CREATININE: 1.07 MG/DL (ref 0.5–1.05)
CREATININE: 1.12 MG/DL (ref 0.5–1.05)
EGFR AFRICAN AMERICAN: 66 ML/MIN/1.73M2
EGFR AFRICAN AMERICAN: 70 ML/MIN/1.73M2
EGFR AFRICAN AMERICAN: 72 ML/MIN/1.73M2
EGFR AFRICAN AMERICAN: 73 ML/MIN/1.73M2
EGFR AFRICAN AMERICAN: 73 ML/MIN/1.73M2
EGFR AFRICAN AMERICAN: 75 ML/MIN/1.73M2
EGFR AFRICAN AMERICAN: 82 ML/MIN/1.73M2
EGFR AFRICAN AMERICAN: 85 ML/MIN/1.73M2
EGFR NON-AFR. AMERICAN: 57 ML/MIN/1.73M2
EGFR NON-AFR. AMERICAN: 60 ML/MIN/1.73M2
EGFR NON-AFR. AMERICAN: 62 ML/MIN/1.73M2
EGFR NON-AFR. AMERICAN: 63 ML/MIN/1.73M2
EGFR NON-AFR. AMERICAN: 63 ML/MIN/1.73M2
EGFR NON-AFR. AMERICAN: 65 ML/MIN/1.73M2
EGFR NON-AFR. AMERICAN: 70 ML/MIN/1.73M2
EGFR NON-AFR. AMERICAN: 73 ML/MIN/1.73M2
EOS%: 11.5 %
EOS%: 4.2 %
EOS%: 5.2 %
EOS%: 5.6 %
EOS%: 6.2 %
EOS%: 6.3 %
EOS%: 6.3 %
EOS%: 7.8 %
EOS%: 8.1 %
EOS%: 9 %
EOS: 0.3 10^3/UL
EOS: 0.4 10^3/UL
EOS: 0.5 10^3/UL
EOS: 0.5 10^3/UL
EOS: 0.6 10^3/UL
EOS: 0.7 10^3/UL
EOS: 0.7 10^3/UL
EOS: 0.9 10^3/UL
GLOBULIN: 1.8 G/DL (CALC) (ref 1.9–3.7)
GLOBULIN: 1.9 G/DL (CALC) (ref 1.9–3.7)
GLOBULIN: 2 G/DL (CALC) (ref 1.9–3.7)
GLOBULIN: 2 G/DL (CALC) (ref 1.9–3.7)
GLUCOSE: 109 MG/DL (ref 65–99)
GLUCOSE: 126 MG/DL (ref 65–99)
GLUCOSE: 132 MG/DL (ref 65–99)
GLUCOSE: 142 MG/DL (ref 65–99)
GLUCOSE: 90 MG/DL (ref 65–99)
GLUCOSE: 92 MG/DL (ref 65–99)
GLUCOSE: 96 MG/DL (ref 65–99)
GLUCOSE: 97 MG/DL (ref 65–99)
HCT: 31.6 % (ref 38–54)
HCT: 31.6 % (ref 38–54)
HCT: 32.3 % (ref 38–54)
HCT: 32.4 % (ref 38–54)
HCT: 32.6 % (ref 38–54)
HCT: 32.8 % (ref 38–54)
HCT: 32.9 % (ref 38–54)
HCT: 33.2 % (ref 38–54)
HCT: 33.2 % (ref 38–54)
HCT: 33.8 % (ref 38–54)
HDL CHOLESTEROL: 30 MG/DL
HGB: 10.5 G/DL (ref 12–18)
HGB: 10.6 G/DL (ref 12–18)
HGB: 10.8 G/DL (ref 12–18)
HGB: 10.9 G/DL (ref 12–18)
HGB: 11 G/DL (ref 12–18)
HGB: 11.2 G/DL (ref 12–18)
HGB: 11.3 G/DL (ref 12–18)
HGB: 11.5 G/DL (ref 12–18)
INTERPRETATION: ABNORMAL
INTERPRETATION: NORMAL
IRON BINDING CAPACITY: 255 MCG/DL (CALC) (ref 250–450)
IRON BINDING CAPACITY: 256 MCG/DL (CALC) (ref 250–450)
IRON BINDING CAPACITY: 261 MCG/DL (CALC) (ref 250–450)
IRON, TOTAL: 68 MCG/DL (ref 45–160)
IRON, TOTAL: 69 MCG/DL (ref 45–160)
IRON, TOTAL: 78 MCG/DL (ref 40–190)
LDL-CHOLESTEROL: 73 MG/DL (CALC)
LYMPH%: 14.1 % (ref 12–44)
LYMPH%: 15.8 % (ref 12–44)
LYMPH%: 15.9 % (ref 12–44)
LYMPH%: 16.4 % (ref 12–44)
LYMPH%: 17.3 % (ref 12–44)
LYMPH%: 17.7 % (ref 12–44)
LYMPH%: 18 % (ref 12–44)
LYMPH%: 19.3 % (ref 12–44)
LYMPH%: 20 % (ref 12–44)
LYMPH%: 20.2 % (ref 12–44)
LYMPH: 1.1 10^3/UL (ref 0.8–2.8)
LYMPH: 1.1 10^3/UL (ref 0.8–2.8)
LYMPH: 1.2 10^3/UL (ref 0.8–2.8)
LYMPH: 1.3 10^3/UL (ref 0.8–2.8)
LYMPH: 1.4 10^3/UL (ref 0.8–2.8)
LYMPH: 1.5 10^3/UL (ref 0.8–2.8)
Lab: 1.58 MIU/L
MCH: 32.3 PG (ref 26–33)
MCH: 32.5 PG (ref 26–33)
MCH: 32.7 PG (ref 26–33)
MCH: 33 PG (ref 26–33)
MCH: 33.1 PG (ref 26–33)
MCH: 33.3 PG (ref 26–33)
MCH: 33.4 PG (ref 26–33)
MCH: 33.4 PG (ref 26–33)
MCHC: 32.7 G/DL (ref 31–36)
MCHC: 33.1 G/DL (ref 31–36)
MCHC: 33.1 G/DL (ref 31–36)
MCHC: 33.2 G/DL (ref 31–36)
MCHC: 33.7 G/DL (ref 31–36)
MCHC: 33.7 G/DL (ref 31–36)
MCHC: 34 G/DL (ref 31–36)
MCHC: 34.1 G/DL (ref 31–36)
MCHC: 34.2 G/DL (ref 31–36)
MCHC: 34.3 G/DL (ref 31–36)
MCV: 96.2 FML (ref 82–100)
MCV: 97 FML (ref 82–100)
MCV: 97.8 FML (ref 82–100)
MCV: 98.2 FML (ref 82–100)
MCV: 98.2 FML (ref 82–100)
MCV: 98.3 FML (ref 82–100)
MCV: 98.8 FML (ref 82–100)
MCV: 99.7 FML (ref 82–100)
MONO%: 5.5 % (ref 2–12)
MONO%: 5.7 % (ref 2–12)
MONO%: 6.1 % (ref 2–12)
MONO%: 6.1 % (ref 2–12)
MONO%: 6.3 % (ref 2–12)
MONO%: 6.7 % (ref 2–12)
MONO%: 6.9 % (ref 2–12)
MONO%: 7.2 % (ref 2–12)
MONO%: 7.5 % (ref 2–12)
MONO%: 7.8 % (ref 2–12)
MONO: 0.4 10^3/UL (ref 0.2–1)
MONO: 0.5 10^3/UL (ref 0.2–1)
MONO: 0.6 10^3/UL (ref 0.2–1)
MONO: 0.6 10^3/UL (ref 0.2–1)
MPV: 10 FML (ref 8.6–11.7)
MPV: 10.2 FML (ref 8.6–11.7)
MPV: 10.2 FML (ref 8.6–11.7)
MPV: 10.3 FML (ref 8.6–11.7)
MPV: 10.4 FML (ref 8.6–11.7)
MPV: 10.5 FML (ref 8.6–11.7)
MPV: 10.6 FML (ref 8.6–11.7)
MPV: 10.6 FML (ref 8.6–11.7)
MPV: 9.4 FML (ref 8.6–11.7)
MPV: 9.7 FML (ref 8.6–11.7)
NEUT%: 63.5 % (ref 47–76)
NEUT%: 65.7 % (ref 47–76)
NEUT%: 65.9 % (ref 47–76)
NEUT%: 66.2 % (ref 47–76)
NEUT%: 67.6 % (ref 47–76)
NEUT%: 70.1 % (ref 47–76)
NEUT%: 71.4 % (ref 47–76)
NEUT%: 71.5 % (ref 47–76)
NEUT%: 71.5 % (ref 47–76)
NEUT%: 72.5 % (ref 47–76)
NEUT: 4.2 10^3/UL (ref 1.5–7.1)
NEUT: 4.5 10^3/UL (ref 1.5–7.1)
NEUT: 4.8 10^3/UL (ref 1.5–7.1)
NEUT: 4.9 10^3/UL (ref 1.5–7.1)
NEUT: 4.9 10^3/UL (ref 1.5–7.1)
NEUT: 5.1 10^3/UL (ref 1.5–7.1)
NEUT: 5.2 10^3/UL (ref 1.5–7.1)
NEUT: 5.4 10^3/UL (ref 1.5–7.1)
NEUT: 5.6 10^3/UL (ref 1.5–7.1)
NEUT: 6.5 10^3/UL (ref 1.5–7.1)
NON-HDL CHOLESTEROL: 97 MG/DL (CALC)
P190 BCR ABL1: NOT DETECTED
P210 BCR ABL1: DETECTED
P210 BCR ABL1: NOT DETECTED
PLT: 174 10^3/UL (ref 150–375)
PLT: 177 10^3/UL (ref 150–375)
PLT: 180 10^3/UL (ref 150–375)
PLT: 181 10^3/UL (ref 150–375)
PLT: 184 10^3/UL (ref 150–375)
PLT: 185 10^3/UL (ref 150–375)
PLT: 187 10^3/UL (ref 150–375)
PLT: 188 10^3/UL (ref 150–375)
PLT: 193 10^3/UL (ref 150–375)
PLT: 195 10^3/UL (ref 150–375)
POTASSIUM: 3.4 MMOL/L (ref 3.5–5.3)
POTASSIUM: 3.5 MMOL/L (ref 3.5–5.3)
POTASSIUM: 3.6 MMOL/L (ref 3.5–5.3)
POTASSIUM: 3.6 MMOL/L (ref 3.5–5.3)
POTASSIUM: 3.7 MMOL/L (ref 3.5–5.3)
POTASSIUM: 3.7 MMOL/L (ref 3.5–5.3)
PRIOR RESULT: ABNORMAL
PRIOR RESULT: NORMAL
PROTEIN, TOTAL: 6 G/DL (ref 6.1–8.1)
PROTEIN, TOTAL: 6 G/DL (ref 6.1–8.1)
PROTEIN, TOTAL: 6.1 G/DL (ref 6.1–8.1)
PROTEIN, TOTAL: 6.1 G/DL (ref 6.1–8.1)
PROTEIN, TOTAL: 6.2 G/DL (ref 6.1–8.1)
PROTEIN, TOTAL: 6.2 G/DL (ref 6.1–8.1)
PROTEIN, TOTAL: 6.3 G/DL (ref 6.1–8.1)
PROTEIN, TOTAL: 6.3 G/DL (ref 6.1–8.1)
RBC: 3.17 10^6/UL (ref 4.2–6.2)
RBC: 3.23 10^6/UL (ref 4.2–6.2)
RBC: 3.27 10^6/UL (ref 4.2–6.2)
RBC: 3.28 10^6/UL (ref 4.2–6.2)
RBC: 3.32 10^6/UL (ref 4.2–6.2)
RBC: 3.36 10^6/UL (ref 4.2–6.2)
RBC: 3.38 10^6/UL (ref 4.2–6.2)
RBC: 3.38 10^6/UL (ref 4.2–6.2)
RBC: 3.42 10^6/UL (ref 4.2–6.2)
RBC: 3.44 10^6/UL (ref 4.2–6.2)
RDW-CV: 13.3 %
RDW-CV: 13.4 %
RDW-CV: 13.5 %
RDW-CV: 13.6 %
RDW-CV: 13.7 %
RDW-CV: 13.8 %
RDW-CV: 13.9 %
RDW-SD: 45.2 FML (ref 36–50)
RDW-SD: 46.1 FML (ref 36–50)
RDW-SD: 46.5 FML (ref 36–50)
RDW-SD: 47.1 FML (ref 36–50)
RDW-SD: 47.6 FML (ref 36–50)
RDW-SD: 47.6 FML (ref 36–50)
RDW-SD: 47.8 FML (ref 36–50)
RDW-SD: 48 FML (ref 36–50)
RDW-SD: 48.2 FML (ref 36–50)
RDW-SD: 48.6 FML (ref 36–50)
SODIUM: 140 MMOL/L (ref 135–146)
SODIUM: 140 MMOL/L (ref 135–146)
SODIUM: 141 MMOL/L (ref 135–146)
SODIUM: 142 MMOL/L (ref 135–146)
SODIUM: 143 MMOL/L (ref 135–146)
SOURCE:: ABNORMAL
SOURCE:: NORMAL
TRIGLYCERIDES: 160 MG/DL
UREA NITROGEN (BUN): 11 MG/DL (ref 7–25)
UREA NITROGEN (BUN): 11 MG/DL (ref 7–25)
UREA NITROGEN (BUN): 12 MG/DL (ref 7–25)
UREA NITROGEN (BUN): 14 MG/DL (ref 7–25)
UREA NITROGEN (BUN): 15 MG/DL (ref 7–25)
WBC: 6.4 10^3/UL (ref 4.3–11)
WBC: 6.7 10^3/UL (ref 4.3–11)
WBC: 6.9 10^3/UL (ref 4.3–11)
WBC: 7.1 10^3/UL (ref 4.3–11)
WBC: 7.2 10^3/UL (ref 4.3–11)
WBC: 7.5 10^3/UL (ref 4.3–11)
WBC: 7.6 10^3/UL (ref 4.3–11)
WBC: 7.9 10^3/UL (ref 4.3–11)
WBC: 7.9 10^3/UL (ref 4.3–11)
WBC: 9.1 10^3/UL (ref 4.3–11)

## 2020-10-11 VITALS
DIASTOLIC BLOOD PRESSURE: 78 MMHG | BODY MASS INDEX: 30.83 KG/M2 | SYSTOLIC BLOOD PRESSURE: 140 MMHG | HEIGHT: 63 IN | WEIGHT: 174.01 LBS

## 2020-10-11 VITALS — SYSTOLIC BLOOD PRESSURE: 140 MMHG | DIASTOLIC BLOOD PRESSURE: 78 MMHG | WEIGHT: 173 LBS

## 2020-10-11 VITALS
HEIGHT: 63 IN | WEIGHT: 174.01 LBS | BODY MASS INDEX: 30.83 KG/M2 | DIASTOLIC BLOOD PRESSURE: 84 MMHG | SYSTOLIC BLOOD PRESSURE: 136 MMHG

## 2020-10-11 VITALS
SYSTOLIC BLOOD PRESSURE: 140 MMHG | DIASTOLIC BLOOD PRESSURE: 83 MMHG | WEIGHT: 173 LBS | HEIGHT: 63 IN | BODY MASS INDEX: 30.65 KG/M2

## 2020-10-11 VITALS — DIASTOLIC BLOOD PRESSURE: 70 MMHG | WEIGHT: 175 LBS | SYSTOLIC BLOOD PRESSURE: 144 MMHG

## 2020-10-11 VITALS
HEIGHT: 63 IN | BODY MASS INDEX: 31.45 KG/M2 | DIASTOLIC BLOOD PRESSURE: 88 MMHG | WEIGHT: 177.5 LBS | SYSTOLIC BLOOD PRESSURE: 146 MMHG

## 2020-10-11 VITALS — WEIGHT: 175.99 LBS | SYSTOLIC BLOOD PRESSURE: 138 MMHG | DIASTOLIC BLOOD PRESSURE: 80 MMHG

## 2020-10-11 VITALS — SYSTOLIC BLOOD PRESSURE: 147 MMHG | DIASTOLIC BLOOD PRESSURE: 82 MMHG | WEIGHT: 174.01 LBS

## 2020-10-11 VITALS — SYSTOLIC BLOOD PRESSURE: 152 MMHG | DIASTOLIC BLOOD PRESSURE: 77 MMHG | WEIGHT: 175 LBS

## 2020-10-11 VITALS — WEIGHT: 172 LBS | SYSTOLIC BLOOD PRESSURE: 162 MMHG | DIASTOLIC BLOOD PRESSURE: 86 MMHG

## 2020-10-13 VITALS — SYSTOLIC BLOOD PRESSURE: 167 MMHG | BODY MASS INDEX: 31.18 KG/M2 | WEIGHT: 176 LBS | DIASTOLIC BLOOD PRESSURE: 77 MMHG

## 2020-10-13 LAB
ALBUMIN/GLOB SERPL: 1.5 (CALC)
ALBUMIN: 4 G/DL
ALKALINE PHOSPHATASE: 66 UNIT/L
ALT: 28 UNIT/L
AST: 27 UNIT/L
BASO%: 0.4 %
BASO: 0 10^3/UL
BILIRUBIN, TOTAL: 0.3 MG/DL
BUN/CREATININE RATIO: 14 (CALC)
CALCIUM: 8.5 MG/DL
CARBON DIOXIDE: 28 MMOL/L
CHLORIDE: 109 MMOL/L
CRCL (C&G) (MOSAIQ HL): 79.88 ML/MIN
CREATININE CLEARANCE (MOSAIQ HL): 62.7 ML/MIN
CREATININE: 1.05 MG/DL
EGFR AFRICAN AMERICAN: 71 ML/MIN/1.73M2
EGFR NON-AFR. AMERICAN: 61 ML/MIN/1.73M2
EOS%: 7.2 %
EOS: 0.6 10^3/UL
GLOBULIN: 2.6 G/DL (CALC)
GLUCOSE: 88 MG/DL
HCT: 34.1 % (ref 38–54)
HGB: 11.3 G/DL (ref 12–18)
LYMPH%: 16.7 % (ref 12–44)
LYMPH: 1.3 10^3/UL (ref 0.8–2.8)
MCH: 32.9 PG (ref 26–33)
MCHC: 33.1 G/DL (ref 31–36)
MCV: 99.4 FML (ref 82–100)
MONO%: 6.8 % (ref 2–12)
MONO: 0.5 10^3/UL (ref 0.2–1)
MPV: 10.1 FML (ref 8.6–11.7)
NEUT%: 68.9 % (ref 47–76)
NEUT: 5.4 10^3/UL (ref 1.5–7.1)
PLT: 184 10^3/UL (ref 150–375)
POTASSIUM: 3.5 MMOL/L
PROTEIN, TOTAL: 6.6 G/DL
RBC: 3.43 10^6/UL (ref 4.2–6.2)
RDW-CV: 13.9 %
RDW-SD: 49.4 FML (ref 36–50)
SODIUM: 145 MMOL/L
UREA NITROGEN (BUN): 15 MG/DL
WBC: 7.9 10^3/UL (ref 4.3–11)

## 2020-10-14 ENCOUNTER — IMMUNIZATION (OUTPATIENT)
Dept: FAMILY MEDICINE CLINIC | Facility: CLINIC | Age: 50
End: 2020-10-14
Payer: COMMERCIAL

## 2020-10-14 DIAGNOSIS — Z23 NEED FOR VACCINATION: ICD-10-CM

## 2020-10-14 PROCEDURE — 90471 IMMUNIZATION ADMIN: CPT | Performed by: FAMILY MEDICINE

## 2020-10-14 PROCEDURE — 90686 IIV4 VACC NO PRSV 0.5 ML IM: CPT | Performed by: FAMILY MEDICINE

## 2020-10-19 ENCOUNTER — TELEPHONE (OUTPATIENT)
Dept: CARDIOLOGY | Age: 50
End: 2020-10-19

## 2020-10-22 ENCOUNTER — APPOINTMENT (OUTPATIENT)
Dept: CARDIOLOGY | Age: 50
End: 2020-10-22

## 2020-10-30 ENCOUNTER — APPOINTMENT (OUTPATIENT)
Dept: INFUSION THERAPY | Age: 50
End: 2020-10-30
Attending: INTERNAL MEDICINE

## 2020-11-03 ENCOUNTER — LAB SERVICES (OUTPATIENT)
Dept: INFUSION THERAPY | Age: 50
End: 2020-11-03
Attending: INTERNAL MEDICINE

## 2020-11-03 ENCOUNTER — OFFICE VISIT (OUTPATIENT)
Dept: INFUSION THERAPY | Age: 50
End: 2020-11-03
Attending: INTERNAL MEDICINE

## 2020-11-03 VITALS
WEIGHT: 180.6 LBS | HEART RATE: 81 BPM | BODY MASS INDEX: 31.99 KG/M2 | DIASTOLIC BLOOD PRESSURE: 77 MMHG | SYSTOLIC BLOOD PRESSURE: 162 MMHG

## 2020-11-03 DIAGNOSIS — C92.11 BCR/ABL1-POSITIVE CHRONIC MYELOID LEUKEMIA (CML) IN REMISSION (CMD): Primary | ICD-10-CM

## 2020-11-03 DIAGNOSIS — Z86.39 HISTORY OF IRON DEFICIENCY: ICD-10-CM

## 2020-11-03 PROCEDURE — 85025 COMPLETE CBC W/AUTO DIFF WBC: CPT

## 2020-11-03 PROCEDURE — 99213 OFFICE O/P EST LOW 20 MIN: CPT | Performed by: INTERNAL MEDICINE

## 2020-11-03 PROCEDURE — 36415 COLL VENOUS BLD VENIPUNCTURE: CPT

## 2020-11-03 RX ORDER — IMATINIB MESYLATE 100 MG/1
300 TABLET, FILM COATED ORAL EVERY 12 HOURS
Qty: 180 TABLET | Refills: 6 | Status: SHIPPED | OUTPATIENT
Start: 2020-11-03 | End: 2021-05-11 | Stop reason: SDUPTHER

## 2020-11-03 ASSESSMENT — ENCOUNTER SYMPTOMS
BLOOD IN STOOL: 0
UNEXPECTED WEIGHT CHANGE: 0
TROUBLE SWALLOWING: 0
HEADACHES: 0
BACK PAIN: 0
STRIDOR: 0
ABDOMINAL PAIN: 0
FEVER: 0
LIGHT-HEADEDNESS: 0
WEAKNESS: 0
POLYDIPSIA: 0
CONSTIPATION: 0
CHOKING: 0
FACIAL SWELLING: 0
WHEEZING: 0
ADENOPATHY: 0
COUGH: 0
NUMBNESS: 0
APPETITE CHANGE: 0
NAUSEA: 0
SHORTNESS OF BREATH: 0
VOMITING: 0
BRUISES/BLEEDS EASILY: 0
DIAPHORESIS: 0
ABDOMINAL DISTENTION: 0
DIZZINESS: 0
ACTIVITY CHANGE: 0
DIARRHEA: 0

## 2020-11-03 ASSESSMENT — PATIENT HEALTH QUESTIONNAIRE - PHQ9
2. FEELING DOWN, DEPRESSED OR HOPELESS: NOT AT ALL
CLINICAL INTERPRETATION OF PHQ2 SCORE: NO FURTHER SCREENING NEEDED
SUM OF ALL RESPONSES TO PHQ9 QUESTIONS 1 AND 2: 0
1. LITTLE INTEREST OR PLEASURE IN DOING THINGS: NOT AT ALL
SUM OF ALL RESPONSES TO PHQ9 QUESTIONS 1 AND 2: 0
CLINICAL INTERPRETATION OF PHQ9 SCORE: NO FURTHER SCREENING NEEDED

## 2020-11-04 LAB
ALBUMIN SERPL-MCNC: 3.8 G/DL (ref 3.6–5.1)
ALBUMIN/GLOB SERPL: 1.5 {RATIO} (ref 1–2.4)
ALP SERPL-CCNC: 71 UNITS/L (ref 45–117)
ALT SERPL-CCNC: 25 UNITS/L
ANION GAP SERPL CALC-SCNC: 13 MMOL/L (ref 10–20)
AST SERPL-CCNC: 30 UNITS/L
BILIRUB SERPL-MCNC: 0.3 MG/DL (ref 0.2–1)
BUN SERPL-MCNC: 12 MG/DL (ref 6–20)
BUN/CREAT SERPL: 11 (ref 7–25)
CALCIUM SERPL-MCNC: 8.6 MG/DL (ref 8.4–10.2)
CHLORIDE SERPL-SCNC: 108 MMOL/L (ref 98–107)
CO2 SERPL-SCNC: 26 MMOL/L (ref 21–32)
CREAT SERPL-MCNC: 1.12 MG/DL (ref 0.51–0.95)
GLOBULIN SER-MCNC: 2.5 G/DL (ref 2–4)
GLUCOSE SERPL-MCNC: 88 MG/DL (ref 65–99)
IRON SATN MFR SERPL: 24 % (ref 15–45)
IRON SERPL-MCNC: 65 MCG/DL (ref 50–170)
LENGTH OF FAST TIME PATIENT: ABNORMAL HRS
POTASSIUM SERPL-SCNC: 3.7 MMOL/L (ref 3.4–5.1)
PROT SERPL-MCNC: 6.3 G/DL (ref 6.4–8.2)
SODIUM SERPL-SCNC: 143 MMOL/L (ref 135–145)
TIBC SERPL-MCNC: 268 MCG/DL (ref 250–450)

## 2020-11-07 LAB
PATHOLOGIST NAME: ABNORMAL
T(9;22)(ABL1,BCR) NFR BLD/T: ABNORMAL %
T(9;22)(ABL1,BCR)/CONTROL BLD/T-LNRTO: >4.7
T(ABL1,BCR)P210 BLD/T QL: ABNORMAL
T(ABL1,BCR)P210/CONTROL (IS) BLD/T: <0.002 %

## 2020-11-11 ENCOUNTER — OFFICE VISIT (OUTPATIENT)
Dept: CARDIOLOGY | Age: 50
End: 2020-11-11

## 2020-11-11 VITALS
HEART RATE: 77 BPM | BODY MASS INDEX: 31.71 KG/M2 | SYSTOLIC BLOOD PRESSURE: 142 MMHG | WEIGHT: 179 LBS | HEIGHT: 63 IN | DIASTOLIC BLOOD PRESSURE: 78 MMHG

## 2020-11-11 DIAGNOSIS — R94.31 ABNORMAL ELECTROCARDIOGRAM (ECG) (EKG): ICD-10-CM

## 2020-11-11 DIAGNOSIS — R00.2 PALPITATIONS: Primary | ICD-10-CM

## 2020-11-11 DIAGNOSIS — R00.0 TACHYCARDIA, UNSPECIFIED: ICD-10-CM

## 2020-11-11 PROCEDURE — 99244 OFF/OP CNSLTJ NEW/EST MOD 40: CPT | Performed by: INTERNAL MEDICINE

## 2020-11-11 PROCEDURE — 93000 ELECTROCARDIOGRAM COMPLETE: CPT | Performed by: INTERNAL MEDICINE

## 2020-11-11 RX ORDER — METOPROLOL SUCCINATE 25 MG/1
25 TABLET, EXTENDED RELEASE ORAL DAILY
Qty: 30 TABLET | Refills: 11 | Status: SHIPPED | OUTPATIENT
Start: 2020-11-11

## 2020-11-11 SDOH — HEALTH STABILITY: PHYSICAL HEALTH: ON AVERAGE, HOW MANY MINUTES DO YOU ENGAGE IN EXERCISE AT THIS LEVEL?: 30 MIN

## 2020-11-11 SDOH — HEALTH STABILITY: MENTAL HEALTH: HOW OFTEN DO YOU HAVE A DRINK CONTAINING ALCOHOL?: NEVER

## 2020-11-11 SDOH — SOCIAL STABILITY: SOCIAL NETWORK: ARE YOU MARRIED, WIDOWED, DIVORCED, SEPARATED, NEVER MARRIED, OR LIVING WITH A PARTNER?: MARRIED

## 2020-11-11 SDOH — HEALTH STABILITY: PHYSICAL HEALTH: ON AVERAGE, HOW MANY DAYS PER WEEK DO YOU ENGAGE IN MODERATE TO STRENUOUS EXERCISE (LIKE A BRISK WALK)?: 2 DAYS

## 2020-11-11 ASSESSMENT — PATIENT HEALTH QUESTIONNAIRE - PHQ9
1. LITTLE INTEREST OR PLEASURE IN DOING THINGS: NOT AT ALL
CLINICAL INTERPRETATION OF PHQ2 SCORE: NO FURTHER SCREENING NEEDED
CLINICAL INTERPRETATION OF PHQ9 SCORE: NO FURTHER SCREENING NEEDED
SUM OF ALL RESPONSES TO PHQ9 QUESTIONS 1 AND 2: 0
SUM OF ALL RESPONSES TO PHQ9 QUESTIONS 1 AND 2: 0
2. FEELING DOWN, DEPRESSED OR HOPELESS: NOT AT ALL

## 2020-11-17 ENCOUNTER — TELEPHONE (OUTPATIENT)
Dept: INFUSION THERAPY | Age: 50
End: 2020-11-17

## 2020-11-17 PROBLEM — R00.2 PALPITATIONS: Status: ACTIVE | Noted: 2020-11-11

## 2020-11-18 ENCOUNTER — OFFICE VISIT (OUTPATIENT)
Dept: FAMILY MEDICINE CLINIC | Facility: CLINIC | Age: 50
End: 2020-11-18
Payer: COMMERCIAL

## 2020-11-18 VITALS
DIASTOLIC BLOOD PRESSURE: 80 MMHG | WEIGHT: 185.38 LBS | BODY MASS INDEX: 34.11 KG/M2 | TEMPERATURE: 97 F | SYSTOLIC BLOOD PRESSURE: 138 MMHG | HEIGHT: 62 IN | HEART RATE: 68 BPM | RESPIRATION RATE: 18 BRPM

## 2020-11-18 DIAGNOSIS — I10 ESSENTIAL HYPERTENSION, BENIGN: ICD-10-CM

## 2020-11-18 DIAGNOSIS — R00.2 PALPITATIONS: Primary | ICD-10-CM

## 2020-11-18 PROCEDURE — 3008F BODY MASS INDEX DOCD: CPT | Performed by: FAMILY MEDICINE

## 2020-11-18 PROCEDURE — 3075F SYST BP GE 130 - 139MM HG: CPT | Performed by: FAMILY MEDICINE

## 2020-11-18 PROCEDURE — 3079F DIAST BP 80-89 MM HG: CPT | Performed by: FAMILY MEDICINE

## 2020-11-18 PROCEDURE — 99214 OFFICE O/P EST MOD 30 MIN: CPT | Performed by: FAMILY MEDICINE

## 2020-11-18 PROCEDURE — 99072 ADDL SUPL MATRL&STAF TM PHE: CPT | Performed by: FAMILY MEDICINE

## 2020-11-18 NOTE — PROGRESS NOTES
HPI:   Patient presents with:  Atrial Fibrillation: follow up     Sherie Alston is a 44year old female who presents for recheck of her hypertension. She has been taking medications as instructed, with no medication side effects.  Her home BP Neck: Normal range of motion. Cardiovascular: Normal rate, regular rhythm, S1 normal, S2 normal and normal heart sounds. No murmur heard. Pulses:       Posterior tibial pulses are 2+ on the right side and 2+ on the left side. Edema not present.

## 2020-11-18 NOTE — ASSESSMENT & PLAN NOTE
Stable, Continue present management.     Blood Pressure and Cardiac Medications          Metoprolol Succinate ER 25 MG Oral Tablet 24 Hr    lisinopril 40 MG Oral Tab

## 2020-11-27 ENCOUNTER — TELEPHONE (OUTPATIENT)
Dept: CARDIOLOGY | Age: 50
End: 2020-11-27

## 2020-11-27 ENCOUNTER — HOSPITAL ENCOUNTER (OUTPATIENT)
Dept: CV DIAGNOSTICS | Age: 50
Discharge: HOME | End: 2020-11-27
Attending: INTERNAL MEDICINE
Payer: COMMERCIAL

## 2020-11-27 DIAGNOSIS — R00.2 PALPITATIONS: ICD-10-CM

## 2020-11-27 PROCEDURE — 93306 TTE W/DOPPLER COMPLETE: CPT | Performed by: INTERNAL MEDICINE

## 2020-12-03 RX ORDER — LISINOPRIL 20 MG/1
TABLET ORAL
Qty: 90 TABLET | Refills: 0 | Status: SHIPPED | OUTPATIENT
Start: 2020-12-03 | End: 2021-08-13

## 2020-12-03 NOTE — TELEPHONE ENCOUNTER
Requested Prescriptions     Pending Prescriptions Disp Refills   • LISINOPRIL 20 MG Oral Tab [Pharmacy Med Name: Lisinopril 20 Mg Tab [facility]] 90 tablet 0     Sig: TAKE ONE TABLET BY MOUTH ONCE DAILY     LOV 11/18/2020     Patient was asked to follow-up in: 4

## 2020-12-31 ENCOUNTER — PRIOR ORIGINAL RECORDS (OUTPATIENT)
Dept: OTHER | Age: 50
End: 2020-12-31

## 2021-01-01 ENCOUNTER — EXTERNAL RECORD (OUTPATIENT)
Dept: INFUSION THERAPY | Age: 51
End: 2021-01-01

## 2021-03-19 ENCOUNTER — OFFICE VISIT (OUTPATIENT)
Dept: FAMILY MEDICINE CLINIC | Facility: CLINIC | Age: 51
End: 2021-03-19
Payer: COMMERCIAL

## 2021-03-19 VITALS
SYSTOLIC BLOOD PRESSURE: 136 MMHG | DIASTOLIC BLOOD PRESSURE: 78 MMHG | BODY MASS INDEX: 34.01 KG/M2 | TEMPERATURE: 98 F | RESPIRATION RATE: 18 BRPM | HEIGHT: 62 IN | WEIGHT: 184.81 LBS | HEART RATE: 72 BPM

## 2021-03-19 DIAGNOSIS — R00.2 PALPITATIONS: Primary | ICD-10-CM

## 2021-03-19 PROCEDURE — 99213 OFFICE O/P EST LOW 20 MIN: CPT | Performed by: FAMILY MEDICINE

## 2021-03-19 PROCEDURE — 3008F BODY MASS INDEX DOCD: CPT | Performed by: FAMILY MEDICINE

## 2021-03-19 PROCEDURE — 3078F DIAST BP <80 MM HG: CPT | Performed by: FAMILY MEDICINE

## 2021-03-19 PROCEDURE — 3075F SYST BP GE 130 - 139MM HG: CPT | Performed by: FAMILY MEDICINE

## 2021-03-19 NOTE — PROGRESS NOTES
Myranda is a 44year old female coming in for had concerns including Heart Problem (a-fib follow up ). HPI/Subjective:   Heart Problem  This is a recurrent problem. The problem occurs 2 to 4 times per day. The problem has been unchanged.  Pertinent negati Findings: No rash. Neurological:      Mental Status: She is alert and oriented to person, place, and time. Psychiatric:         Mood and Affect: Mood normal.         Behavior: Behavior normal.         Thought Content:  Thought content normal.         Mary Guerra

## 2021-03-27 ENCOUNTER — IMMUNIZATION (OUTPATIENT)
Dept: LAB | Age: 51
End: 2021-03-27
Attending: HOSPITALIST
Payer: COMMERCIAL

## 2021-03-27 DIAGNOSIS — Z23 NEED FOR VACCINATION: Primary | ICD-10-CM

## 2021-03-27 PROCEDURE — 0001A SARSCOV2 VAC 30MCG/0.3ML IM: CPT

## 2021-04-17 ENCOUNTER — IMMUNIZATION (OUTPATIENT)
Dept: LAB | Age: 51
End: 2021-04-17
Attending: HOSPITALIST
Payer: COMMERCIAL

## 2021-04-17 DIAGNOSIS — Z23 NEED FOR VACCINATION: Primary | ICD-10-CM

## 2021-04-17 PROCEDURE — 0002A SARSCOV2 VAC 30MCG/0.3ML IM: CPT

## 2021-05-07 ENCOUNTER — OFFICE VISIT (OUTPATIENT)
Dept: HEMATOLOGY/ONCOLOGY | Age: 51
End: 2021-05-07
Attending: INTERNAL MEDICINE

## 2021-05-07 ENCOUNTER — TELEPHONE (OUTPATIENT)
Dept: FAMILY MEDICINE CLINIC | Facility: CLINIC | Age: 51
End: 2021-05-07

## 2021-05-07 ENCOUNTER — HOSPITAL ENCOUNTER (OUTPATIENT)
Dept: LAB | Age: 51
Discharge: STILL ADMITTED OR ADMINISTRATIVE | End: 2021-05-07
Attending: INTERNAL MEDICINE

## 2021-05-07 VITALS
BODY MASS INDEX: 31.89 KG/M2 | HEART RATE: 98 BPM | OXYGEN SATURATION: 97 % | SYSTOLIC BLOOD PRESSURE: 157 MMHG | WEIGHT: 180 LBS | DIASTOLIC BLOOD PRESSURE: 74 MMHG

## 2021-05-07 DIAGNOSIS — C92.11 BCR/ABL1-POSITIVE CHRONIC MYELOID LEUKEMIA (CML) IN REMISSION (CMD): ICD-10-CM

## 2021-05-07 DIAGNOSIS — Z86.39 HISTORY OF IRON DEFICIENCY: ICD-10-CM

## 2021-05-07 DIAGNOSIS — C92.11 BCR/ABL1-POSITIVE CHRONIC MYELOID LEUKEMIA (CML) IN REMISSION (CMD): Primary | ICD-10-CM

## 2021-05-07 LAB
ALBUMIN SERPL-MCNC: 3.9 G/DL (ref 3.6–5.1)
ALBUMIN/GLOB SERPL: 1.6 {RATIO} (ref 1–2.4)
ALP SERPL-CCNC: 74 UNITS/L (ref 45–117)
ALT SERPL-CCNC: 37 UNITS/L
ANION GAP SERPL CALC-SCNC: 8 MMOL/L (ref 10–20)
AST SERPL-CCNC: 36 UNITS/L
BASOPHILS # BLD: 0 K/MCL (ref 0–0.3)
BASOPHILS NFR BLD: 1 %
BILIRUB SERPL-MCNC: 0.3 MG/DL (ref 0.2–1)
BUN SERPL-MCNC: 16 MG/DL (ref 6–20)
BUN/CREAT SERPL: 13 (ref 7–25)
CALCIUM SERPL-MCNC: 8.7 MG/DL (ref 8.4–10.2)
CHLORIDE SERPL-SCNC: 109 MMOL/L (ref 98–107)
CO2 SERPL-SCNC: 30 MMOL/L (ref 21–32)
CREAT SERPL-MCNC: 1.25 MG/DL (ref 0.51–0.95)
DEPRECATED RDW RBC: 49.8 FL (ref 39–50)
EOSINOPHIL # BLD: 0 K/MCL (ref 0–0.5)
EOSINOPHIL NFR BLD: 0 %
ERYTHROCYTE [DISTWIDTH] IN BLOOD: 13.8 % (ref 11–15)
FASTING DURATION TIME PATIENT: ABNORMAL H
GFR SERPLBLD BASED ON 1.73 SQ M-ARVRAT: 50 ML/MIN/1.73M2
GLOBULIN SER-MCNC: 2.5 G/DL (ref 2–4)
GLUCOSE SERPL-MCNC: 99 MG/DL (ref 65–99)
HCT VFR BLD CALC: 32.8 % (ref 36–46.5)
HGB BLD-MCNC: 11 G/DL (ref 12–15.5)
IMM GRANULOCYTES # BLD AUTO: 0 K/MCL (ref 0–0.2)
IMM GRANULOCYTES # BLD: 0 %
IRON SATN MFR SERPL: 18 % (ref 15–45)
IRON SERPL-MCNC: 54 MCG/DL (ref 50–170)
LYMPHOCYTES # BLD: 1.5 K/MCL (ref 1–4)
LYMPHOCYTES NFR BLD: 17 %
MCH RBC QN AUTO: 33 PG (ref 26–34)
MCHC RBC AUTO-ENTMCNC: 33.5 G/DL (ref 32–36.5)
MCV RBC AUTO: 98.5 FL (ref 78–100)
MONOCYTES # BLD: 0.6 K/MCL (ref 0.3–0.9)
MONOCYTES NFR BLD: 7 %
NEUTROPHILS # BLD: 6.7 K/MCL (ref 1.8–7.7)
NEUTROPHILS NFR BLD: 75 %
NRBC BLD MANUAL-RTO: 0 /100 WBC
PLATELET # BLD AUTO: 204 K/MCL (ref 140–450)
POTASSIUM SERPL-SCNC: 3.7 MMOL/L (ref 3.4–5.1)
PROT SERPL-MCNC: 6.4 G/DL (ref 6.4–8.2)
RBC # BLD: 3.33 MIL/MCL (ref 4–5.2)
SODIUM SERPL-SCNC: 143 MMOL/L (ref 135–145)
TIBC SERPL-MCNC: 296 MCG/DL (ref 250–450)
WBC # BLD: 8.8 K/MCL (ref 4.2–11)

## 2021-05-07 PROCEDURE — 81206 BCR/ABL1 GENE MAJOR BP: CPT | Performed by: INTERNAL MEDICINE

## 2021-05-07 PROCEDURE — 99213 OFFICE O/P EST LOW 20 MIN: CPT | Performed by: INTERNAL MEDICINE

## 2021-05-07 PROCEDURE — 83550 IRON BINDING TEST: CPT | Performed by: INTERNAL MEDICINE

## 2021-05-07 PROCEDURE — 36415 COLL VENOUS BLD VENIPUNCTURE: CPT | Performed by: INTERNAL MEDICINE

## 2021-05-07 PROCEDURE — 80053 COMPREHEN METABOLIC PANEL: CPT | Performed by: INTERNAL MEDICINE

## 2021-05-07 PROCEDURE — 85025 COMPLETE CBC W/AUTO DIFF WBC: CPT | Performed by: INTERNAL MEDICINE

## 2021-05-07 ASSESSMENT — PATIENT HEALTH QUESTIONNAIRE - PHQ9
2. FEELING DOWN, DEPRESSED OR HOPELESS: NOT AT ALL
1. LITTLE INTEREST OR PLEASURE IN DOING THINGS: NOT AT ALL
CLINICAL INTERPRETATION OF PHQ2 SCORE: NO FURTHER SCREENING NEEDED
CLINICAL INTERPRETATION OF PHQ9 SCORE: NO FURTHER SCREENING NEEDED
SUM OF ALL RESPONSES TO PHQ9 QUESTIONS 1 AND 2: 0
SUM OF ALL RESPONSES TO PHQ9 QUESTIONS 1 AND 2: 0

## 2021-05-07 ASSESSMENT — ENCOUNTER SYMPTOMS
ALLERGIC/IMMUNOLOGIC NEGATIVE: 1
ENDOCRINE NEGATIVE: 1
PSYCHIATRIC NEGATIVE: 1
EYES NEGATIVE: 1
HEMATOLOGIC/LYMPHATIC NEGATIVE: 1
NEUROLOGICAL NEGATIVE: 1
GASTROINTESTINAL NEGATIVE: 1
RESPIRATORY NEGATIVE: 1

## 2021-05-11 DIAGNOSIS — C92.11 BCR/ABL1-POSITIVE CHRONIC MYELOID LEUKEMIA (CML) IN REMISSION (CMD): ICD-10-CM

## 2021-05-11 LAB
SERVICE CMNT-IMP: NORMAL
T(ABL1,BCR)P210 BLD/T QL: NOT DETECTED

## 2021-05-11 RX ORDER — IMATINIB MESYLATE 100 MG/1
300 TABLET, FILM COATED ORAL EVERY 12 HOURS
Qty: 180 TABLET | Refills: 6 | Status: SHIPPED | OUTPATIENT
Start: 2021-05-11 | End: 2021-12-01

## 2021-05-12 ENCOUNTER — OFFICE VISIT (OUTPATIENT)
Dept: CARDIOLOGY | Age: 51
End: 2021-05-12

## 2021-05-12 VITALS
HEIGHT: 63 IN | BODY MASS INDEX: 32.6 KG/M2 | WEIGHT: 184 LBS | SYSTOLIC BLOOD PRESSURE: 162 MMHG | DIASTOLIC BLOOD PRESSURE: 88 MMHG | HEART RATE: 80 BPM

## 2021-05-12 DIAGNOSIS — I10 BENIGN ESSENTIAL HTN: ICD-10-CM

## 2021-05-12 DIAGNOSIS — R00.2 PALPITATIONS: Primary | ICD-10-CM

## 2021-05-12 PROCEDURE — 3077F SYST BP >= 140 MM HG: CPT | Performed by: INTERNAL MEDICINE

## 2021-05-12 PROCEDURE — 99214 OFFICE O/P EST MOD 30 MIN: CPT | Performed by: INTERNAL MEDICINE

## 2021-05-12 PROCEDURE — 3079F DIAST BP 80-89 MM HG: CPT | Performed by: INTERNAL MEDICINE

## 2021-05-12 ASSESSMENT — PATIENT HEALTH QUESTIONNAIRE - PHQ9
CLINICAL INTERPRETATION OF PHQ2 SCORE: NO FURTHER SCREENING NEEDED
SUM OF ALL RESPONSES TO PHQ9 QUESTIONS 1 AND 2: 0
CLINICAL INTERPRETATION OF PHQ9 SCORE: NO FURTHER SCREENING NEEDED
1. LITTLE INTEREST OR PLEASURE IN DOING THINGS: NOT AT ALL
SUM OF ALL RESPONSES TO PHQ9 QUESTIONS 1 AND 2: 0
2. FEELING DOWN, DEPRESSED OR HOPELESS: NOT AT ALL

## 2021-05-28 ENCOUNTER — TELEPHONE (OUTPATIENT)
Dept: HEMATOLOGY/ONCOLOGY | Age: 51
End: 2021-05-28

## 2021-07-09 ENCOUNTER — HOSPITAL ENCOUNTER (OUTPATIENT)
Dept: MAMMOGRAPHY | Age: 51
Discharge: HOME | End: 2021-07-09
Attending: FAMILY MEDICINE
Payer: COMMERCIAL

## 2021-07-09 DIAGNOSIS — I10 ESSENTIAL HYPERTENSION, BENIGN: ICD-10-CM

## 2021-07-09 DIAGNOSIS — Z12.31 VISIT FOR SCREENING MAMMOGRAM: ICD-10-CM

## 2021-07-09 PROCEDURE — 77067 SCR MAMMO BI INCL CAD: CPT | Performed by: FAMILY MEDICINE

## 2021-07-09 PROCEDURE — 77063 BREAST TOMOSYNTHESIS BI: CPT | Performed by: FAMILY MEDICINE

## 2021-07-09 RX ORDER — LISINOPRIL 40 MG/1
TABLET ORAL
Qty: 90 TABLET | Refills: 0 | Status: SHIPPED | OUTPATIENT
Start: 2021-07-09 | End: 2021-08-13

## 2021-07-09 NOTE — TELEPHONE ENCOUNTER
Requested Prescriptions     Pending Prescriptions Disp Refills   • LISINOPRIL 40 MG Oral Tab [Pharmacy Med Name: Lisinopril 40 Mg Tab Sol] 90 tablet 0     Sig: TAKE ONE TABLET BY MOUTH ONE TIME DAILY     LOV 3/19/2021     Patient was asked to follow-up in

## 2021-08-13 ENCOUNTER — OFFICE VISIT (OUTPATIENT)
Dept: FAMILY MEDICINE CLINIC | Facility: CLINIC | Age: 51
End: 2021-08-13
Payer: COMMERCIAL

## 2021-08-13 VITALS
BODY MASS INDEX: 33.86 KG/M2 | RESPIRATION RATE: 14 BRPM | WEIGHT: 184 LBS | HEART RATE: 88 BPM | SYSTOLIC BLOOD PRESSURE: 138 MMHG | DIASTOLIC BLOOD PRESSURE: 64 MMHG | HEIGHT: 62 IN

## 2021-08-13 DIAGNOSIS — I10 ESSENTIAL HYPERTENSION, BENIGN: Primary | ICD-10-CM

## 2021-08-13 DIAGNOSIS — C92.11 BCR/ABL1-POSITIVE CHRONIC MYELOID LEUKEMIA (CML) IN REMISSION (HCC): ICD-10-CM

## 2021-08-13 DIAGNOSIS — E78.6 LOW HDL (UNDER 40): ICD-10-CM

## 2021-08-13 PROCEDURE — 3078F DIAST BP <80 MM HG: CPT | Performed by: FAMILY MEDICINE

## 2021-08-13 PROCEDURE — 3075F SYST BP GE 130 - 139MM HG: CPT | Performed by: FAMILY MEDICINE

## 2021-08-13 PROCEDURE — 3008F BODY MASS INDEX DOCD: CPT | Performed by: FAMILY MEDICINE

## 2021-08-13 PROCEDURE — 99214 OFFICE O/P EST MOD 30 MIN: CPT | Performed by: FAMILY MEDICINE

## 2021-08-13 RX ORDER — LISINOPRIL 40 MG/1
40 TABLET ORAL DAILY
Qty: 90 TABLET | Refills: 3 | Status: SHIPPED | OUTPATIENT
Start: 2021-08-13 | End: 2021-08-13

## 2021-08-13 RX ORDER — LISINOPRIL 40 MG/1
40 TABLET ORAL DAILY
Qty: 90 TABLET | Refills: 3 | Status: SHIPPED | OUTPATIENT
Start: 2021-08-13

## 2021-08-13 NOTE — PROGRESS NOTES
Danielle Gabriel is a 46year old female coming in for had concerns including Medication Follow-Up (on all medications). HPI/Subjective:   Medication Follow-Up  This is a chronic problem. The problem occurs constantly.     doing well but still with ML    ROS: GENE 3  2. Low HDL (under 40)  Overview:  HDL 30, but LDL 73 4/2019  3.  BCR/ABL1-positive chronic myeloid leukemia (CML) in remission [facility])  Overview:  Dr James Grant and Deb buck [address]    Longstanding major molecular response to imatinib    Last Assessment

## 2021-08-26 ENCOUNTER — TELEPHONE (OUTPATIENT)
Dept: HEMATOLOGY/ONCOLOGY | Age: 51
End: 2021-08-26

## 2021-09-10 ENCOUNTER — OFFICE VISIT (OUTPATIENT)
Dept: HEMATOLOGY/ONCOLOGY | Age: 51
End: 2021-09-10
Attending: INTERNAL MEDICINE

## 2021-09-10 ENCOUNTER — HOSPITAL ENCOUNTER (OUTPATIENT)
Dept: LAB | Age: 51
Discharge: STILL ADMITTED OR ADMINISTRATIVE | End: 2021-09-10
Attending: INTERNAL MEDICINE

## 2021-09-10 VITALS
HEART RATE: 78 BPM | BODY MASS INDEX: 32.43 KG/M2 | WEIGHT: 183.1 LBS | DIASTOLIC BLOOD PRESSURE: 77 MMHG | OXYGEN SATURATION: 100 % | SYSTOLIC BLOOD PRESSURE: 150 MMHG

## 2021-09-10 DIAGNOSIS — C92.11 BCR/ABL1-POSITIVE CHRONIC MYELOID LEUKEMIA (CML) IN REMISSION (CMD): ICD-10-CM

## 2021-09-10 DIAGNOSIS — C92.11 BCR/ABL1-POSITIVE CHRONIC MYELOID LEUKEMIA (CML) IN REMISSION (CMD): Primary | ICD-10-CM

## 2021-09-10 LAB
ALBUMIN SERPL-MCNC: 3.8 G/DL (ref 3.6–5.1)
ALBUMIN/GLOB SERPL: 1.4 {RATIO} (ref 1–2.4)
ALP SERPL-CCNC: 67 UNITS/L (ref 45–117)
ALT SERPL-CCNC: 37 UNITS/L
ANION GAP SERPL CALC-SCNC: 7 MMOL/L (ref 10–20)
AST SERPL-CCNC: 46 UNITS/L
BASOPHILS # BLD: 0.1 K/MCL (ref 0–0.3)
BASOPHILS NFR BLD: 1 %
BILIRUB SERPL-MCNC: 0.4 MG/DL (ref 0.2–1)
BUN SERPL-MCNC: 17 MG/DL (ref 6–20)
BUN/CREAT SERPL: 14 (ref 7–25)
CALCIUM SERPL-MCNC: 8.2 MG/DL (ref 8.4–10.2)
CHLORIDE SERPL-SCNC: 108 MMOL/L (ref 98–107)
CO2 SERPL-SCNC: 29 MMOL/L (ref 21–32)
CREAT SERPL-MCNC: 1.19 MG/DL (ref 0.51–0.95)
DEPRECATED RDW RBC: 51 FL (ref 39–50)
EOSINOPHIL # BLD: 0 K/MCL (ref 0–0.5)
EOSINOPHIL NFR BLD: 0 %
ERYTHROCYTE [DISTWIDTH] IN BLOOD: 14 % (ref 11–15)
FASTING DURATION TIME PATIENT: ABNORMAL H
GFR SERPLBLD BASED ON 1.73 SQ M-ARVRAT: 52 ML/MIN
GLOBULIN SER-MCNC: 2.8 G/DL (ref 2–4)
GLUCOSE SERPL-MCNC: 105 MG/DL (ref 65–99)
HCT VFR BLD CALC: 33.4 % (ref 36–46.5)
HGB BLD-MCNC: 11.2 G/DL (ref 12–15.5)
IMM GRANULOCYTES # BLD AUTO: 0 K/MCL (ref 0–0.2)
IMM GRANULOCYTES # BLD: 0 %
LYMPHOCYTES # BLD: 1.7 K/MCL (ref 1–4)
LYMPHOCYTES NFR BLD: 18 %
MCH RBC QN AUTO: 32.8 PG (ref 26–34)
MCHC RBC AUTO-ENTMCNC: 33.5 G/DL (ref 32–36.5)
MCV RBC AUTO: 97.9 FL (ref 78–100)
MONOCYTES # BLD: 0.7 K/MCL (ref 0.3–0.9)
MONOCYTES NFR BLD: 7 %
NEUTROPHILS # BLD: 6.8 K/MCL (ref 1.8–7.7)
NEUTROPHILS NFR BLD: 74 %
NRBC BLD MANUAL-RTO: 0 /100 WBC
PLATELET # BLD AUTO: 221 K/MCL (ref 140–450)
POTASSIUM SERPL-SCNC: 3.4 MMOL/L (ref 3.4–5.1)
PROT SERPL-MCNC: 6.6 G/DL (ref 6.4–8.2)
RBC # BLD: 3.41 MIL/MCL (ref 4–5.2)
SODIUM SERPL-SCNC: 141 MMOL/L (ref 135–145)
WBC # BLD: 9.2 K/MCL (ref 4.2–11)

## 2021-09-10 PROCEDURE — 3078F DIAST BP <80 MM HG: CPT | Performed by: INTERNAL MEDICINE

## 2021-09-10 PROCEDURE — 80053 COMPREHEN METABOLIC PANEL: CPT | Performed by: INTERNAL MEDICINE

## 2021-09-10 PROCEDURE — 36415 COLL VENOUS BLD VENIPUNCTURE: CPT | Performed by: INTERNAL MEDICINE

## 2021-09-10 PROCEDURE — 81206 BCR/ABL1 GENE MAJOR BP: CPT | Performed by: INTERNAL MEDICINE

## 2021-09-10 PROCEDURE — 99214 OFFICE O/P EST MOD 30 MIN: CPT | Performed by: INTERNAL MEDICINE

## 2021-09-10 PROCEDURE — 85025 COMPLETE CBC W/AUTO DIFF WBC: CPT | Performed by: INTERNAL MEDICINE

## 2021-09-10 PROCEDURE — 3077F SYST BP >= 140 MM HG: CPT | Performed by: INTERNAL MEDICINE

## 2021-09-10 ASSESSMENT — ENCOUNTER SYMPTOMS
HEMATOLOGIC/LYMPHATIC NEGATIVE: 1
EYES NEGATIVE: 1
ENDOCRINE NEGATIVE: 1
RESPIRATORY NEGATIVE: 1
NEUROLOGICAL NEGATIVE: 1
GASTROINTESTINAL NEGATIVE: 1
ALLERGIC/IMMUNOLOGIC NEGATIVE: 1
PSYCHIATRIC NEGATIVE: 1

## 2021-09-10 ASSESSMENT — PATIENT HEALTH QUESTIONNAIRE - PHQ9
SUM OF ALL RESPONSES TO PHQ9 QUESTIONS 1 AND 2: 0
1. LITTLE INTEREST OR PLEASURE IN DOING THINGS: NOT AT ALL
2. FEELING DOWN, DEPRESSED OR HOPELESS: NOT AT ALL
SUM OF ALL RESPONSES TO PHQ9 QUESTIONS 1 AND 2: 0
CLINICAL INTERPRETATION OF PHQ9 SCORE: NO FURTHER SCREENING NEEDED
CLINICAL INTERPRETATION OF PHQ2 SCORE: NO FURTHER SCREENING NEEDED

## 2021-09-14 LAB
SERVICE CMNT-IMP: NORMAL
T(ABL1,BCR)P210 BLD/T QL: NOT DETECTED

## 2021-09-17 ENCOUNTER — TELEPHONE (OUTPATIENT)
Dept: HEMATOLOGY/ONCOLOGY | Age: 51
End: 2021-09-17

## 2021-10-15 ENCOUNTER — NURSE ONLY (OUTPATIENT)
Dept: FAMILY MEDICINE CLINIC | Facility: CLINIC | Age: 51
End: 2021-10-15
Payer: COMMERCIAL

## 2021-10-15 VITALS — TEMPERATURE: 98 F

## 2021-10-15 DIAGNOSIS — Z23 NEEDS FLU SHOT: Primary | ICD-10-CM

## 2021-10-15 PROCEDURE — 90686 IIV4 VACC NO PRSV 0.5 ML IM: CPT | Performed by: FAMILY MEDICINE

## 2021-10-15 PROCEDURE — 90471 IMMUNIZATION ADMIN: CPT | Performed by: FAMILY MEDICINE

## 2021-10-15 NOTE — PROGRESS NOTES
Patient is here today for her flu shot. She has no complaints. The Flu shot is given in the right deltoid.    Patient signed consent and given VIS

## 2021-11-05 ENCOUNTER — IMMUNIZATION (OUTPATIENT)
Dept: LAB | Facility: HOSPITAL | Age: 51
End: 2021-11-05
Attending: EMERGENCY MEDICINE
Payer: COMMERCIAL

## 2021-11-05 DIAGNOSIS — Z23 NEED FOR VACCINATION: Primary | ICD-10-CM

## 2021-11-05 PROCEDURE — 0003A SARSCOV2 VAC 30MCG/0.3ML IM: CPT

## 2021-11-17 ENCOUNTER — APPOINTMENT (OUTPATIENT)
Dept: CARDIOLOGY | Age: 51
End: 2021-11-17

## 2021-12-01 DIAGNOSIS — C92.11 BCR/ABL1-POSITIVE CHRONIC MYELOID LEUKEMIA (CML) IN REMISSION (CMD): ICD-10-CM

## 2021-12-01 RX ORDER — IMATINIB MESYLATE 100 MG/1
TABLET, FILM COATED ORAL
Qty: 180 TABLET | Refills: 6 | Status: SHIPPED | OUTPATIENT
Start: 2021-12-01 | End: 2022-06-28

## 2022-01-07 ENCOUNTER — HOSPITAL ENCOUNTER (OUTPATIENT)
Dept: LAB | Age: 52
Discharge: STILL ADMITTED OR ADMINISTRATIVE | End: 2022-01-07
Attending: INTERNAL MEDICINE

## 2022-01-07 ENCOUNTER — OFFICE VISIT (OUTPATIENT)
Dept: HEMATOLOGY/ONCOLOGY | Age: 52
End: 2022-01-07
Attending: INTERNAL MEDICINE

## 2022-01-07 VITALS
WEIGHT: 183 LBS | HEART RATE: 82 BPM | RESPIRATION RATE: 20 BRPM | BODY MASS INDEX: 32.43 KG/M2 | HEIGHT: 63 IN | DIASTOLIC BLOOD PRESSURE: 81 MMHG | SYSTOLIC BLOOD PRESSURE: 150 MMHG | OXYGEN SATURATION: 97 %

## 2022-01-07 DIAGNOSIS — Z86.39 HISTORY OF IRON DEFICIENCY: Primary | ICD-10-CM

## 2022-01-07 DIAGNOSIS — C92.11 BCR/ABL1-POSITIVE CHRONIC MYELOID LEUKEMIA (CML) IN REMISSION (CMD): ICD-10-CM

## 2022-01-07 LAB
ALBUMIN SERPL-MCNC: 3.5 G/DL (ref 3.6–5.1)
ALBUMIN/GLOB SERPL: 1.2 {RATIO} (ref 1–2.4)
ALP SERPL-CCNC: 66 UNITS/L (ref 45–117)
ALT SERPL-CCNC: 27 UNITS/L
ANION GAP SERPL CALC-SCNC: 8 MMOL/L (ref 10–20)
AST SERPL-CCNC: 33 UNITS/L
BASOPHILS # BLD: 0 K/MCL (ref 0–0.3)
BASOPHILS NFR BLD: 0 %
BILIRUB SERPL-MCNC: 0.3 MG/DL (ref 0.2–1)
BUN SERPL-MCNC: 14 MG/DL (ref 6–20)
BUN/CREAT SERPL: 12 (ref 7–25)
CALCIUM SERPL-MCNC: 8.6 MG/DL (ref 8.4–10.2)
CHLORIDE SERPL-SCNC: 106 MMOL/L (ref 98–107)
CO2 SERPL-SCNC: 29 MMOL/L (ref 21–32)
CREAT SERPL-MCNC: 1.17 MG/DL (ref 0.51–0.95)
DEPRECATED RDW RBC: 52.3 FL (ref 39–50)
EOSINOPHIL # BLD: 0 K/MCL (ref 0–0.5)
EOSINOPHIL NFR BLD: 0 %
ERYTHROCYTE [DISTWIDTH] IN BLOOD: 14.2 % (ref 11–15)
FASTING DURATION TIME PATIENT: ABNORMAL H
GFR SERPLBLD BASED ON 1.73 SQ M-ARVRAT: 53 ML/MIN
GLOBULIN SER-MCNC: 3 G/DL (ref 2–4)
GLUCOSE SERPL-MCNC: 175 MG/DL (ref 70–99)
HCT VFR BLD CALC: 33.8 % (ref 36–46.5)
HGB BLD-MCNC: 10.8 G/DL (ref 12–15.5)
IMM GRANULOCYTES # BLD AUTO: 0 K/MCL (ref 0–0.2)
IMM GRANULOCYTES # BLD: 0 %
LYMPHOCYTES # BLD: 1.4 K/MCL (ref 1–4)
LYMPHOCYTES NFR BLD: 14 %
MCH RBC QN AUTO: 31.8 PG (ref 26–34)
MCHC RBC AUTO-ENTMCNC: 32 G/DL (ref 32–36.5)
MCV RBC AUTO: 99.4 FL (ref 78–100)
MONOCYTES # BLD: 0.7 K/MCL (ref 0.3–0.9)
MONOCYTES NFR BLD: 7 %
NEUTROPHILS # BLD: 7.6 K/MCL (ref 1.8–7.7)
NEUTROPHILS NFR BLD: 79 %
NRBC BLD MANUAL-RTO: 0 /100 WBC
PLATELET # BLD AUTO: 219 K/MCL (ref 140–450)
POTASSIUM SERPL-SCNC: 3.3 MMOL/L (ref 3.4–5.1)
PROT SERPL-MCNC: 6.5 G/DL (ref 6.4–8.2)
RBC # BLD: 3.4 MIL/MCL (ref 4–5.2)
SODIUM SERPL-SCNC: 140 MMOL/L (ref 135–145)
WBC # BLD: 9.8 K/MCL (ref 4.2–11)

## 2022-01-07 PROCEDURE — 3079F DIAST BP 80-89 MM HG: CPT | Performed by: INTERNAL MEDICINE

## 2022-01-07 PROCEDURE — 81206 BCR/ABL1 GENE MAJOR BP: CPT | Performed by: INTERNAL MEDICINE

## 2022-01-07 PROCEDURE — 80053 COMPREHEN METABOLIC PANEL: CPT | Performed by: INTERNAL MEDICINE

## 2022-01-07 PROCEDURE — 3077F SYST BP >= 140 MM HG: CPT | Performed by: INTERNAL MEDICINE

## 2022-01-07 PROCEDURE — 99211 OFF/OP EST MAY X REQ PHY/QHP: CPT

## 2022-01-07 PROCEDURE — 85025 COMPLETE CBC W/AUTO DIFF WBC: CPT | Performed by: INTERNAL MEDICINE

## 2022-01-07 PROCEDURE — 36415 COLL VENOUS BLD VENIPUNCTURE: CPT | Performed by: INTERNAL MEDICINE

## 2022-01-07 PROCEDURE — 99215 OFFICE O/P EST HI 40 MIN: CPT | Performed by: INTERNAL MEDICINE

## 2022-01-07 ASSESSMENT — PATIENT HEALTH QUESTIONNAIRE - PHQ9
2. FEELING DOWN, DEPRESSED OR HOPELESS: NOT AT ALL
SUM OF ALL RESPONSES TO PHQ9 QUESTIONS 1 AND 2: 0
1. LITTLE INTEREST OR PLEASURE IN DOING THINGS: NOT AT ALL
CLINICAL INTERPRETATION OF PHQ2 SCORE: NO FURTHER SCREENING NEEDED
SUM OF ALL RESPONSES TO PHQ9 QUESTIONS 1 AND 2: 0

## 2022-01-11 LAB
BCR-ABL1, LOG REDUCTION - NUMERIC: 4.56
SERVICE CMNT-IMP: ABNORMAL
T(ABL1,BCR)P210 BLD/T QL: POSITIVE
T(ABL1,BCR)P210/CONTROL (IS) BLD/T: 0 %

## 2022-04-13 ENCOUNTER — OFFICE VISIT (OUTPATIENT)
Dept: OBGYN CLINIC | Facility: CLINIC | Age: 52
End: 2022-04-13
Payer: COMMERCIAL

## 2022-04-13 VITALS
HEART RATE: 84 BPM | WEIGHT: 179.25 LBS | SYSTOLIC BLOOD PRESSURE: 150 MMHG | DIASTOLIC BLOOD PRESSURE: 84 MMHG | BODY MASS INDEX: 32.99 KG/M2 | HEIGHT: 62 IN

## 2022-04-13 DIAGNOSIS — Z01.419 WELL WOMAN EXAM WITH ROUTINE GYNECOLOGICAL EXAM: Primary | ICD-10-CM

## 2022-04-13 DIAGNOSIS — Z12.4 CERVICAL CANCER SCREENING: ICD-10-CM

## 2022-04-13 PROCEDURE — 87624 HPV HI-RISK TYP POOLED RSLT: CPT | Performed by: NURSE PRACTITIONER

## 2022-04-13 PROCEDURE — 3079F DIAST BP 80-89 MM HG: CPT | Performed by: NURSE PRACTITIONER

## 2022-04-13 PROCEDURE — 3077F SYST BP >= 140 MM HG: CPT | Performed by: NURSE PRACTITIONER

## 2022-04-13 PROCEDURE — 99396 PREV VISIT EST AGE 40-64: CPT | Performed by: NURSE PRACTITIONER

## 2022-04-13 PROCEDURE — 3008F BODY MASS INDEX DOCD: CPT | Performed by: NURSE PRACTITIONER

## 2022-04-25 LAB — HPV I/H RISK 1 DNA SPEC QL NAA+PROBE: NEGATIVE

## 2022-05-06 ENCOUNTER — OFFICE VISIT (OUTPATIENT)
Dept: HEMATOLOGY/ONCOLOGY | Age: 52
End: 2022-05-06
Attending: INTERNAL MEDICINE

## 2022-05-06 ENCOUNTER — HOSPITAL ENCOUNTER (OUTPATIENT)
Dept: LAB | Age: 52
Discharge: STILL ADMITTED OR ADMINISTRATIVE | End: 2022-05-06
Attending: INTERNAL MEDICINE

## 2022-05-06 VITALS
TEMPERATURE: 97.5 F | SYSTOLIC BLOOD PRESSURE: 149 MMHG | WEIGHT: 178.9 LBS | BODY MASS INDEX: 31.7 KG/M2 | OXYGEN SATURATION: 98 % | HEIGHT: 63 IN | DIASTOLIC BLOOD PRESSURE: 76 MMHG | HEART RATE: 78 BPM

## 2022-05-06 DIAGNOSIS — C92.11 BCR/ABL1-POSITIVE CHRONIC MYELOID LEUKEMIA (CML) IN REMISSION (CMD): ICD-10-CM

## 2022-05-06 DIAGNOSIS — C92.11 BCR/ABL1-POSITIVE CHRONIC MYELOID LEUKEMIA (CML) IN REMISSION (CMD): Primary | ICD-10-CM

## 2022-05-06 LAB
ALBUMIN SERPL-MCNC: 3.5 G/DL (ref 3.6–5.1)
ALBUMIN/GLOB SERPL: 1.3 {RATIO} (ref 1–2.4)
ALP SERPL-CCNC: 65 UNITS/L (ref 45–117)
ALT SERPL-CCNC: 27 UNITS/L
ANION GAP SERPL CALC-SCNC: 5 MMOL/L (ref 10–20)
AST SERPL-CCNC: 29 UNITS/L
BASOPHILS # BLD: 0.1 K/MCL (ref 0–0.3)
BASOPHILS NFR BLD: 1 %
BILIRUB SERPL-MCNC: 0.3 MG/DL (ref 0.2–1)
BUN SERPL-MCNC: 15 MG/DL (ref 6–20)
BUN/CREAT SERPL: 12 (ref 7–25)
CALCIUM SERPL-MCNC: 8.6 MG/DL (ref 8.4–10.2)
CHLORIDE SERPL-SCNC: 109 MMOL/L (ref 98–107)
CO2 SERPL-SCNC: 30 MMOL/L (ref 21–32)
CREAT SERPL-MCNC: 1.26 MG/DL (ref 0.51–0.95)
DEPRECATED RDW RBC: 51.1 FL (ref 39–50)
EOSINOPHIL # BLD: 1.1 K/MCL (ref 0–0.5)
EOSINOPHIL NFR BLD: 13 %
ERYTHROCYTE [DISTWIDTH] IN BLOOD: 14.2 % (ref 11–15)
FASTING DURATION TIME PATIENT: ABNORMAL H
GFR SERPLBLD BASED ON 1.73 SQ M-ARVRAT: 51 ML/MIN
GLOBULIN SER-MCNC: 2.7 G/DL (ref 2–4)
GLUCOSE SERPL-MCNC: 143 MG/DL (ref 70–99)
HCT VFR BLD CALC: 32.7 % (ref 36–46.5)
HGB BLD-MCNC: 10.9 G/DL (ref 12–15.5)
IMM GRANULOCYTES # BLD AUTO: 0 K/MCL (ref 0–0.2)
IMM GRANULOCYTES # BLD: 0 %
LYMPHOCYTES # BLD: 1.4 K/MCL (ref 1–4)
LYMPHOCYTES NFR BLD: 17 %
MCH RBC QN AUTO: 32.8 PG (ref 26–34)
MCHC RBC AUTO-ENTMCNC: 33.3 G/DL (ref 32–36.5)
MCV RBC AUTO: 98.5 FL (ref 78–100)
MONOCYTES # BLD: 0.5 K/MCL (ref 0.3–0.9)
MONOCYTES NFR BLD: 5 %
NEUTROPHILS # BLD: 5.4 K/MCL (ref 1.8–7.7)
NEUTROPHILS NFR BLD: 64 %
NRBC BLD MANUAL-RTO: 0 /100 WBC
PLATELET # BLD AUTO: 214 K/MCL (ref 140–450)
POTASSIUM SERPL-SCNC: 3.3 MMOL/L (ref 3.4–5.1)
PROT SERPL-MCNC: 6.2 G/DL (ref 6.4–8.2)
RBC # BLD: 3.32 MIL/MCL (ref 4–5.2)
SODIUM SERPL-SCNC: 141 MMOL/L (ref 135–145)
WBC # BLD: 8.4 K/MCL (ref 4.2–11)

## 2022-05-06 PROCEDURE — 81206 BCR/ABL1 GENE MAJOR BP: CPT | Performed by: INTERNAL MEDICINE

## 2022-05-06 PROCEDURE — 3077F SYST BP >= 140 MM HG: CPT | Performed by: INTERNAL MEDICINE

## 2022-05-06 PROCEDURE — 3078F DIAST BP <80 MM HG: CPT | Performed by: INTERNAL MEDICINE

## 2022-05-06 PROCEDURE — 80053 COMPREHEN METABOLIC PANEL: CPT | Performed by: INTERNAL MEDICINE

## 2022-05-06 PROCEDURE — 36415 COLL VENOUS BLD VENIPUNCTURE: CPT | Performed by: INTERNAL MEDICINE

## 2022-05-06 PROCEDURE — 99211 OFF/OP EST MAY X REQ PHY/QHP: CPT

## 2022-05-06 PROCEDURE — 99214 OFFICE O/P EST MOD 30 MIN: CPT | Performed by: INTERNAL MEDICINE

## 2022-05-06 PROCEDURE — 85025 COMPLETE CBC W/AUTO DIFF WBC: CPT | Performed by: INTERNAL MEDICINE

## 2022-05-06 ASSESSMENT — PATIENT HEALTH QUESTIONNAIRE - PHQ9
CLINICAL INTERPRETATION OF PHQ2 SCORE: NO FURTHER SCREENING NEEDED
SUM OF ALL RESPONSES TO PHQ9 QUESTIONS 1 AND 2: 0
SUM OF ALL RESPONSES TO PHQ9 QUESTIONS 1 AND 2: 0
1. LITTLE INTEREST OR PLEASURE IN DOING THINGS: NOT AT ALL
2. FEELING DOWN, DEPRESSED OR HOPELESS: NOT AT ALL

## 2022-05-06 ASSESSMENT — PAIN SCALES - GENERAL: PAINLEVEL: 0

## 2022-05-07 ENCOUNTER — IMMUNIZATION (OUTPATIENT)
Dept: LAB | Age: 52
End: 2022-05-07
Attending: EMERGENCY MEDICINE
Payer: COMMERCIAL

## 2022-05-07 DIAGNOSIS — Z23 NEED FOR VACCINATION: Primary | ICD-10-CM

## 2022-05-07 PROCEDURE — 0054A SARSCOV2 VAC 30MCG TRS SUCR: CPT

## 2022-05-10 LAB
BCR-ABL1, LOG REDUCTION - NUMERIC: >4.7
SERVICE CMNT-IMP: ABNORMAL
T(ABL1,BCR)P210 BLD/T QL: ABNORMAL
T(ABL1,BCR)P210/CONTROL (IS) BLD/T: 0 %

## 2022-06-28 DIAGNOSIS — C92.11 BCR/ABL1-POSITIVE CHRONIC MYELOID LEUKEMIA (CML) IN REMISSION (CMD): ICD-10-CM

## 2022-06-28 RX ORDER — IMATINIB MESYLATE 100 MG/1
TABLET, FILM COATED ORAL
Qty: 180 TABLET | Refills: 6 | Status: SHIPPED | OUTPATIENT
Start: 2022-06-28 | End: 2023-01-31

## 2022-09-09 ENCOUNTER — HOSPITAL ENCOUNTER (OUTPATIENT)
Dept: LAB | Age: 52
Discharge: STILL ADMITTED OR ADMINISTRATIVE | End: 2022-09-09
Attending: INTERNAL MEDICINE

## 2022-09-09 ENCOUNTER — OFFICE VISIT (OUTPATIENT)
Dept: HEMATOLOGY/ONCOLOGY | Age: 52
End: 2022-09-09
Attending: INTERNAL MEDICINE

## 2022-09-09 VITALS
OXYGEN SATURATION: 99 % | TEMPERATURE: 97.7 F | HEART RATE: 83 BPM | WEIGHT: 173 LBS | SYSTOLIC BLOOD PRESSURE: 147 MMHG | HEIGHT: 63 IN | DIASTOLIC BLOOD PRESSURE: 76 MMHG | BODY MASS INDEX: 30.65 KG/M2

## 2022-09-09 DIAGNOSIS — Z86.39 HISTORY OF IRON DEFICIENCY: ICD-10-CM

## 2022-09-09 DIAGNOSIS — C92.11 BCR/ABL1-POSITIVE CHRONIC MYELOID LEUKEMIA (CML) IN REMISSION (CMD): Primary | ICD-10-CM

## 2022-09-09 DIAGNOSIS — C92.11 BCR/ABL1-POSITIVE CHRONIC MYELOID LEUKEMIA (CML) IN REMISSION (CMD): ICD-10-CM

## 2022-09-09 LAB
ALBUMIN SERPL-MCNC: 3.7 G/DL (ref 3.6–5.1)
ALBUMIN/GLOB SERPL: 1.5 {RATIO} (ref 1–2.4)
ALP SERPL-CCNC: 61 UNITS/L (ref 45–117)
ALT SERPL-CCNC: 22 UNITS/L
ANION GAP SERPL CALC-SCNC: 7 MMOL/L (ref 7–19)
AST SERPL-CCNC: 28 UNITS/L
BASOPHILS # BLD: 0 K/MCL (ref 0–0.3)
BASOPHILS NFR BLD: 1 %
BILIRUB SERPL-MCNC: 0.3 MG/DL (ref 0.2–1)
BUN SERPL-MCNC: 14 MG/DL (ref 6–20)
BUN/CREAT SERPL: 11 (ref 7–25)
CALCIUM SERPL-MCNC: 8.6 MG/DL (ref 8.4–10.2)
CHLORIDE SERPL-SCNC: 108 MMOL/L (ref 97–110)
CO2 SERPL-SCNC: 30 MMOL/L (ref 21–32)
CREAT SERPL-MCNC: 1.33 MG/DL (ref 0.51–0.95)
DEPRECATED RDW RBC: 49.6 FL (ref 39–50)
EOSINOPHIL # BLD: 0.1 K/MCL (ref 0–0.5)
EOSINOPHIL NFR BLD: 2 %
ERYTHROCYTE [DISTWIDTH] IN BLOOD: 14 % (ref 11–15)
FASTING DURATION TIME PATIENT: ABNORMAL H
FERRITIN SERPL-MCNC: 77 NG/ML (ref 8–252)
GFR SERPLBLD BASED ON 1.73 SQ M-ARVRAT: 48 ML/MIN
GLOBULIN SER-MCNC: 2.5 G/DL (ref 2–4)
GLUCOSE SERPL-MCNC: 118 MG/DL (ref 70–99)
HCT VFR BLD CALC: 33.3 % (ref 36–46.5)
HGB BLD-MCNC: 11.1 G/DL (ref 12–15.5)
IMM GRANULOCYTES # BLD AUTO: 0 K/MCL (ref 0–0.2)
IMM GRANULOCYTES # BLD: 0 %
IRON SATN MFR SERPL: 28 % (ref 15–45)
IRON SERPL-MCNC: 80 MCG/DL (ref 50–170)
LYMPHOCYTES # BLD: 1.5 K/MCL (ref 1–4)
LYMPHOCYTES NFR BLD: 19 %
MCH RBC QN AUTO: 32.6 PG (ref 26–34)
MCHC RBC AUTO-ENTMCNC: 33.3 G/DL (ref 32–36.5)
MCV RBC AUTO: 97.9 FL (ref 78–100)
MONOCYTES # BLD: 0.5 K/MCL (ref 0.3–0.9)
MONOCYTES NFR BLD: 7 %
NEUTROPHILS # BLD: 5.7 K/MCL (ref 1.8–7.7)
NEUTROPHILS NFR BLD: 71 %
NRBC BLD MANUAL-RTO: 0 /100 WBC
PLATELET # BLD AUTO: 205 K/MCL (ref 140–450)
POTASSIUM SERPL-SCNC: 4 MMOL/L (ref 3.4–5.1)
PROT SERPL-MCNC: 6.2 G/DL (ref 6.4–8.2)
RBC # BLD: 3.4 MIL/MCL (ref 4–5.2)
SODIUM SERPL-SCNC: 141 MMOL/L (ref 135–145)
TIBC SERPL-MCNC: 281 MCG/DL (ref 250–450)
WBC # BLD: 7.9 K/MCL (ref 4.2–11)

## 2022-09-09 PROCEDURE — 3078F DIAST BP <80 MM HG: CPT | Performed by: INTERNAL MEDICINE

## 2022-09-09 PROCEDURE — 36415 COLL VENOUS BLD VENIPUNCTURE: CPT | Performed by: INTERNAL MEDICINE

## 2022-09-09 PROCEDURE — 83550 IRON BINDING TEST: CPT | Performed by: INTERNAL MEDICINE

## 2022-09-09 PROCEDURE — 82728 ASSAY OF FERRITIN: CPT | Performed by: INTERNAL MEDICINE

## 2022-09-09 PROCEDURE — 80053 COMPREHEN METABOLIC PANEL: CPT | Performed by: INTERNAL MEDICINE

## 2022-09-09 PROCEDURE — 81206 BCR/ABL1 GENE MAJOR BP: CPT | Performed by: INTERNAL MEDICINE

## 2022-09-09 PROCEDURE — 85025 COMPLETE CBC W/AUTO DIFF WBC: CPT | Performed by: INTERNAL MEDICINE

## 2022-09-09 PROCEDURE — 3077F SYST BP >= 140 MM HG: CPT | Performed by: INTERNAL MEDICINE

## 2022-09-09 PROCEDURE — 99211 OFF/OP EST MAY X REQ PHY/QHP: CPT

## 2022-09-09 PROCEDURE — 99215 OFFICE O/P EST HI 40 MIN: CPT | Performed by: INTERNAL MEDICINE

## 2022-09-09 ASSESSMENT — PAIN SCALES - GENERAL: PAINLEVEL: 0

## 2022-09-13 LAB
SERVICE CMNT-IMP: NORMAL
T(ABL1,BCR)P210 BLD/T QL: NOT DETECTED

## 2022-09-19 ENCOUNTER — TELEPHONE (OUTPATIENT)
Dept: FAMILY MEDICINE CLINIC | Facility: CLINIC | Age: 52
End: 2022-09-19

## 2022-09-19 DIAGNOSIS — Z13.29 SCREENING FOR THYROID DISORDER: ICD-10-CM

## 2022-09-19 DIAGNOSIS — Z13.0 SCREENING FOR BLOOD DISEASE: Primary | ICD-10-CM

## 2022-09-19 DIAGNOSIS — Z12.31 ENCOUNTER FOR SCREENING MAMMOGRAM FOR MALIGNANT NEOPLASM OF BREAST: ICD-10-CM

## 2022-09-19 DIAGNOSIS — Z13.220 SCREENING FOR LIPID DISORDERS: ICD-10-CM

## 2022-09-19 DIAGNOSIS — Z13.228 SCREENING FOR METABOLIC DISORDER: ICD-10-CM

## 2022-09-19 NOTE — TELEPHONE ENCOUNTER
Please enter lab orders for the patient's upcoming physical appointment. Physical scheduled:      Preferred lab: [facility])     The patient has been notified to complete fasting labs prior to their physical appointment.

## 2022-09-23 ENCOUNTER — IMMUNIZATION (OUTPATIENT)
Dept: LAB | Age: 52
End: 2022-09-23
Attending: EMERGENCY MEDICINE

## 2022-09-23 DIAGNOSIS — Z23 NEED FOR VACCINATION: Primary | ICD-10-CM

## 2022-09-23 PROCEDURE — 0124A SARSCOV2 VAC BVL 30MCG/0.3ML: CPT

## 2022-09-30 ENCOUNTER — LAB ENCOUNTER (OUTPATIENT)
Dept: LAB | Age: 52
End: 2022-09-30
Attending: NURSE PRACTITIONER
Payer: COMMERCIAL

## 2022-09-30 DIAGNOSIS — Z13.220 SCREENING FOR LIPID DISORDERS: ICD-10-CM

## 2022-09-30 DIAGNOSIS — Z13.228 SCREENING FOR METABOLIC DISORDER: ICD-10-CM

## 2022-09-30 DIAGNOSIS — Z13.0 SCREENING FOR BLOOD DISEASE: ICD-10-CM

## 2022-09-30 DIAGNOSIS — Z13.29 SCREENING FOR THYROID DISORDER: ICD-10-CM

## 2022-09-30 LAB
ALBUMIN SERPL-MCNC: 3.5 G/DL (ref 3.4–5)
ALBUMIN/GLOB SERPL: 1.3 {RATIO} (ref 1–2)
ALP LIVER SERPL-CCNC: 58 U/L
ALT SERPL-CCNC: 20 U/L
ANION GAP SERPL CALC-SCNC: 5 MMOL/L (ref 0–18)
AST SERPL-CCNC: 27 U/L (ref 15–37)
BASOPHILS # BLD AUTO: 0.07 X10(3) UL (ref 0–0.2)
BASOPHILS NFR BLD AUTO: 0.8 %
BILIRUB SERPL-MCNC: 0.4 MG/DL (ref 0.1–2)
BUN BLD-MCNC: 20 MG/DL (ref 7–18)
CALCIUM BLD-MCNC: 8.8 MG/DL (ref 8.5–10.1)
CHLORIDE SERPL-SCNC: 108 MMOL/L (ref 98–112)
CHOLEST SERPL-MCNC: 110 MG/DL (ref ?–200)
CO2 SERPL-SCNC: 27 MMOL/L (ref 21–32)
CREAT BLD-MCNC: 1.35 MG/DL
EOSINOPHIL # BLD AUTO: 1.09 X10(3) UL (ref 0–0.7)
EOSINOPHIL NFR BLD AUTO: 12 %
ERYTHROCYTE [DISTWIDTH] IN BLOOD BY AUTOMATED COUNT: 14.2 %
FASTING PATIENT LIPID ANSWER: YES
FASTING STATUS PATIENT QL REPORTED: YES
GFR SERPLBLD BASED ON 1.73 SQ M-ARVRAT: 47 ML/MIN/1.73M2 (ref 60–?)
GLOBULIN PLAS-MCNC: 2.7 G/DL (ref 2.8–4.4)
GLUCOSE BLD-MCNC: 103 MG/DL (ref 70–99)
HCT VFR BLD AUTO: 33.4 %
HDLC SERPL-MCNC: 26 MG/DL (ref 40–59)
HGB BLD-MCNC: 10.9 G/DL
IMM GRANULOCYTES # BLD AUTO: 0.03 X10(3) UL (ref 0–1)
IMM GRANULOCYTES NFR BLD: 0.3 %
LDLC SERPL CALC-MCNC: 55 MG/DL (ref ?–100)
LYMPHOCYTES # BLD AUTO: 1.66 X10(3) UL (ref 1–4)
LYMPHOCYTES NFR BLD AUTO: 18.3 %
MCH RBC QN AUTO: 33.3 PG (ref 26–34)
MCHC RBC AUTO-ENTMCNC: 32.6 G/DL (ref 31–37)
MCV RBC AUTO: 102.1 FL
MONOCYTES # BLD AUTO: 0.5 X10(3) UL (ref 0.1–1)
MONOCYTES NFR BLD AUTO: 5.5 %
NEUTROPHILS # BLD AUTO: 5.71 X10 (3) UL (ref 1.5–7.7)
NEUTROPHILS # BLD AUTO: 5.71 X10(3) UL (ref 1.5–7.7)
NEUTROPHILS NFR BLD AUTO: 63.1 %
NONHDLC SERPL-MCNC: 84 MG/DL (ref ?–130)
OSMOLALITY SERPL CALC.SUM OF ELEC: 293 MOSM/KG (ref 275–295)
PLATELET # BLD AUTO: 200 10(3)UL (ref 150–450)
POTASSIUM SERPL-SCNC: 3.7 MMOL/L (ref 3.5–5.1)
PROT SERPL-MCNC: 6.2 G/DL (ref 6.4–8.2)
RBC # BLD AUTO: 3.27 X10(6)UL
SODIUM SERPL-SCNC: 140 MMOL/L (ref 136–145)
TRIGL SERPL-MCNC: 170 MG/DL (ref 30–149)
TSI SER-ACNC: 2.31 MIU/ML (ref 0.36–3.74)
VLDLC SERPL CALC-MCNC: 25 MG/DL (ref 0–30)
WBC # BLD AUTO: 9.1 X10(3) UL (ref 4–11)

## 2022-09-30 PROCEDURE — 84443 ASSAY THYROID STIM HORMONE: CPT

## 2022-09-30 PROCEDURE — 85025 COMPLETE CBC W/AUTO DIFF WBC: CPT

## 2022-09-30 PROCEDURE — 36415 COLL VENOUS BLD VENIPUNCTURE: CPT

## 2022-09-30 PROCEDURE — 80053 COMPREHEN METABOLIC PANEL: CPT

## 2022-09-30 PROCEDURE — 80061 LIPID PANEL: CPT

## 2022-10-05 ENCOUNTER — OFFICE VISIT (OUTPATIENT)
Dept: FAMILY MEDICINE CLINIC | Facility: CLINIC | Age: 52
End: 2022-10-05
Payer: COMMERCIAL

## 2022-10-05 VITALS
DIASTOLIC BLOOD PRESSURE: 66 MMHG | OXYGEN SATURATION: 99 % | HEIGHT: 62 IN | RESPIRATION RATE: 14 BRPM | BODY MASS INDEX: 31.35 KG/M2 | TEMPERATURE: 97 F | SYSTOLIC BLOOD PRESSURE: 136 MMHG | WEIGHT: 170.38 LBS | HEART RATE: 99 BPM

## 2022-10-05 DIAGNOSIS — Z00.00 WELL WOMAN EXAM (NO GYNECOLOGICAL EXAM): Primary | ICD-10-CM

## 2022-10-05 DIAGNOSIS — Z12.11 SCREENING FOR COLON CANCER: ICD-10-CM

## 2022-10-05 DIAGNOSIS — C92.11 BCR/ABL1-POSITIVE CHRONIC MYELOID LEUKEMIA (CML) IN REMISSION (HCC): ICD-10-CM

## 2022-10-05 DIAGNOSIS — D63.8 ANEMIA OF CHRONIC DISEASE: ICD-10-CM

## 2022-10-05 DIAGNOSIS — I10 ESSENTIAL HYPERTENSION, BENIGN: ICD-10-CM

## 2022-10-05 PROCEDURE — 90471 IMMUNIZATION ADMIN: CPT | Performed by: NURSE PRACTITIONER

## 2022-10-05 PROCEDURE — 90686 IIV4 VACC NO PRSV 0.5 ML IM: CPT | Performed by: NURSE PRACTITIONER

## 2022-10-05 PROCEDURE — 99396 PREV VISIT EST AGE 40-64: CPT | Performed by: NURSE PRACTITIONER

## 2022-10-05 PROCEDURE — 3078F DIAST BP <80 MM HG: CPT | Performed by: NURSE PRACTITIONER

## 2022-10-05 PROCEDURE — 3008F BODY MASS INDEX DOCD: CPT | Performed by: NURSE PRACTITIONER

## 2022-10-05 PROCEDURE — 3075F SYST BP GE 130 - 139MM HG: CPT | Performed by: NURSE PRACTITIONER

## 2022-10-11 ENCOUNTER — HOSPITAL ENCOUNTER (OUTPATIENT)
Dept: MAMMOGRAPHY | Age: 52
Discharge: HOME | End: 2022-10-11
Attending: NURSE PRACTITIONER
Payer: COMMERCIAL

## 2022-10-11 DIAGNOSIS — Z12.31 ENCOUNTER FOR SCREENING MAMMOGRAM FOR MALIGNANT NEOPLASM OF BREAST: ICD-10-CM

## 2022-10-11 PROCEDURE — 77067 SCR MAMMO BI INCL CAD: CPT | Performed by: NURSE PRACTITIONER

## 2022-10-11 PROCEDURE — 77063 BREAST TOMOSYNTHESIS BI: CPT | Performed by: NURSE PRACTITIONER

## 2022-10-25 LAB — AMB EXT COLOGUARD RESULT: POSITIVE

## 2022-10-27 ENCOUNTER — HOSPITAL ENCOUNTER (OUTPATIENT)
Dept: ULTRASOUND IMAGING | Age: 52
Discharge: HOME | End: 2022-10-27
Attending: NURSE PRACTITIONER
Payer: COMMERCIAL

## 2022-10-27 ENCOUNTER — HOSPITAL ENCOUNTER (OUTPATIENT)
Dept: MAMMOGRAPHY | Age: 52
Discharge: HOME | End: 2022-10-27
Attending: NURSE PRACTITIONER
Payer: COMMERCIAL

## 2022-10-27 DIAGNOSIS — R92.2 INCONCLUSIVE MAMMOGRAM: ICD-10-CM

## 2022-10-27 PROCEDURE — 76642 ULTRASOUND BREAST LIMITED: CPT | Performed by: NURSE PRACTITIONER

## 2022-10-27 PROCEDURE — 77062 BREAST TOMOSYNTHESIS BI: CPT | Performed by: NURSE PRACTITIONER

## 2022-10-27 PROCEDURE — 77066 DX MAMMO INCL CAD BI: CPT | Performed by: NURSE PRACTITIONER

## 2022-10-27 PROCEDURE — 76641 ULTRASOUND BREAST COMPLETE: CPT | Performed by: NURSE PRACTITIONER

## 2022-11-17 ENCOUNTER — TELEPHONE (OUTPATIENT)
Dept: FAMILY MEDICINE CLINIC | Facility: CLINIC | Age: 52
End: 2022-11-17

## 2022-11-17 DIAGNOSIS — R19.5 POSITIVE COLORECTAL CANCER SCREENING USING COLOGUARD TEST: ICD-10-CM

## 2022-11-17 DIAGNOSIS — Z12.11 SCREEN FOR COLON CANCER: Primary | ICD-10-CM

## 2022-11-17 PROBLEM — I48.91 ATRIAL FIBRILLATION (HCC): Status: ACTIVE | Noted: 2022-11-17

## 2022-11-17 NOTE — TELEPHONE ENCOUNTER
1. Screen for colon cancer (Primary)  -     EVALUATE & TREAT, GASTRO (INTERNAL)  2. Positive colorectal cancer screening using Cologuard test  -     EVALUATE & TREAT, GASTRO (INTERNAL)     Please notified of patient to follow-up with [address] [place] [address] for colonoscopy  OK to notify.  Thanks, Lion Scruggs MD

## 2022-11-17 NOTE — TELEPHONE ENCOUNTER
We received documentation from [employer] regarding ColoGuard results which are POSITIVE.    Results abstracted

## 2022-11-25 DIAGNOSIS — I10 ESSENTIAL HYPERTENSION, BENIGN: ICD-10-CM

## 2022-11-25 RX ORDER — LISINOPRIL 40 MG/1
40 TABLET ORAL DAILY
Qty: 90 TABLET | Refills: 0 | Status: SHIPPED | OUTPATIENT
Start: 2022-11-25

## 2022-11-30 ENCOUNTER — OFFICE VISIT (OUTPATIENT)
Dept: FAMILY MEDICINE CLINIC | Facility: CLINIC | Age: 52
End: 2022-11-30
Payer: COMMERCIAL

## 2022-11-30 VITALS
BODY MASS INDEX: 30.55 KG/M2 | HEIGHT: 62 IN | SYSTOLIC BLOOD PRESSURE: 136 MMHG | RESPIRATION RATE: 14 BRPM | WEIGHT: 166 LBS | OXYGEN SATURATION: 99 % | HEART RATE: 78 BPM | TEMPERATURE: 97 F | DIASTOLIC BLOOD PRESSURE: 72 MMHG

## 2022-11-30 DIAGNOSIS — R19.5 POSITIVE COLORECTAL CANCER SCREENING USING COLOGUARD TEST: ICD-10-CM

## 2022-11-30 DIAGNOSIS — K62.89 RECTAL PAIN: Primary | ICD-10-CM

## 2022-11-30 PROCEDURE — 3008F BODY MASS INDEX DOCD: CPT | Performed by: NURSE PRACTITIONER

## 2022-11-30 PROCEDURE — 3075F SYST BP GE 130 - 139MM HG: CPT | Performed by: NURSE PRACTITIONER

## 2022-11-30 PROCEDURE — 3078F DIAST BP <80 MM HG: CPT | Performed by: NURSE PRACTITIONER

## 2022-11-30 PROCEDURE — 99213 OFFICE O/P EST LOW 20 MIN: CPT | Performed by: NURSE PRACTITIONER

## 2022-11-30 RX ORDER — HYDROCORTISONE 25 MG/G
1 CREAM TOPICAL 2 TIMES DAILY
Qty: 28 G | Refills: 0 | Status: SHIPPED | OUTPATIENT
Start: 2022-11-30

## 2022-12-01 ENCOUNTER — OFFICE VISIT (OUTPATIENT)
Facility: LOCATION | Age: 52
End: 2022-12-01
Payer: COMMERCIAL

## 2022-12-01 VITALS — TEMPERATURE: 97 F | HEART RATE: 75 BPM

## 2022-12-01 DIAGNOSIS — K60.2 ANAL FISSURE: Primary | ICD-10-CM

## 2022-12-01 PROCEDURE — 99203 OFFICE O/P NEW LOW 30 MIN: CPT | Performed by: STUDENT IN AN ORGANIZED HEALTH CARE EDUCATION/TRAINING PROGRAM

## 2023-01-01 ENCOUNTER — EXTERNAL RECORD (OUTPATIENT)
Dept: INFUSION THERAPY | Age: 53
End: 2023-01-01

## 2023-01-05 ENCOUNTER — TELEPHONE (OUTPATIENT)
Facility: LOCATION | Age: 53
End: 2023-01-05

## 2023-01-05 ENCOUNTER — OFFICE VISIT (OUTPATIENT)
Facility: LOCATION | Age: 53
End: 2023-01-05
Payer: COMMERCIAL

## 2023-01-05 DIAGNOSIS — K60.2 ANAL FISSURE: Primary | ICD-10-CM

## 2023-01-05 PROCEDURE — 99213 OFFICE O/P EST LOW 20 MIN: CPT | Performed by: STUDENT IN AN ORGANIZED HEALTH CARE EDUCATION/TRAINING PROGRAM

## 2023-01-05 NOTE — TELEPHONE ENCOUNTER
Initiated authorization for Anal Botox CPT 74892+ dx:K60.2 to be done at [facility] with Melanie  66. at [facility].   [employer] requested clinicals-clinicals will be faxed to [phone] once note is completed  Status: pending

## 2023-01-09 ENCOUNTER — TELEPHONE (OUTPATIENT)
Facility: LOCATION | Age: 53
End: 2023-01-09

## 2023-01-09 NOTE — TELEPHONE ENCOUNTER
[employer] called re: the botox order being done at the hospital they wanted to know how many units are needed. I sent Addie Stock a message letting her know Jojo Flannery is asking for how many units of BOTOX are needed.  [employer] can be reached at [phone]

## 2023-01-09 NOTE — TELEPHONE ENCOUNTER
Received call from Serg Crisostomo at [facility] confirming dose of Botox.  Botox 100 unit vial confirmed  Awaiting determination

## 2023-01-13 ENCOUNTER — HOSPITAL ENCOUNTER (OUTPATIENT)
Dept: LAB | Age: 53
Discharge: STILL ADMITTED OR ADMINISTRATIVE | End: 2023-01-13
Attending: INTERNAL MEDICINE

## 2023-01-13 ENCOUNTER — OFFICE VISIT (OUTPATIENT)
Dept: HEMATOLOGY/ONCOLOGY | Age: 53
End: 2023-01-13
Attending: INTERNAL MEDICINE

## 2023-01-13 VITALS
BODY MASS INDEX: 28.7 KG/M2 | HEIGHT: 63 IN | WEIGHT: 162 LBS | HEART RATE: 74 BPM | DIASTOLIC BLOOD PRESSURE: 80 MMHG | OXYGEN SATURATION: 100 % | SYSTOLIC BLOOD PRESSURE: 157 MMHG | TEMPERATURE: 97 F

## 2023-01-13 DIAGNOSIS — Z86.39 HISTORY OF IRON DEFICIENCY: ICD-10-CM

## 2023-01-13 DIAGNOSIS — C92.11 BCR/ABL1-POSITIVE CHRONIC MYELOID LEUKEMIA (CML) IN REMISSION (CMD): Primary | ICD-10-CM

## 2023-01-13 DIAGNOSIS — C92.11 BCR/ABL1-POSITIVE CHRONIC MYELOID LEUKEMIA (CML) IN REMISSION (CMD): ICD-10-CM

## 2023-01-13 LAB
ALBUMIN SERPL-MCNC: 3.9 G/DL (ref 3.6–5.1)
ALBUMIN/GLOB SERPL: 1.6 {RATIO} (ref 1–2.4)
ALP SERPL-CCNC: 61 UNITS/L (ref 45–117)
ALT SERPL-CCNC: 17 UNITS/L
ANION GAP SERPL CALC-SCNC: 11 MMOL/L (ref 7–19)
AST SERPL-CCNC: 20 UNITS/L
BASOPHILS # BLD: 0.1 K/MCL (ref 0–0.3)
BASOPHILS NFR BLD: 1 %
BILIRUB SERPL-MCNC: 0.3 MG/DL (ref 0.2–1)
BUN SERPL-MCNC: 18 MG/DL (ref 6–20)
BUN/CREAT SERPL: 15 (ref 7–25)
CALCIUM SERPL-MCNC: 8.5 MG/DL (ref 8.4–10.2)
CHLORIDE SERPL-SCNC: 109 MMOL/L (ref 97–110)
CO2 SERPL-SCNC: 28 MMOL/L (ref 21–32)
CREAT SERPL-MCNC: 1.24 MG/DL (ref 0.51–0.95)
DEPRECATED RDW RBC: 50.3 FL (ref 39–50)
EOSINOPHIL # BLD: 0.4 K/MCL (ref 0–0.5)
EOSINOPHIL NFR BLD: 5 %
ERYTHROCYTE [DISTWIDTH] IN BLOOD: 13.7 % (ref 11–15)
FASTING DURATION TIME PATIENT: ABNORMAL H
FERRITIN SERPL-MCNC: 80 NG/ML (ref 8–252)
GFR SERPLBLD BASED ON 1.73 SQ M-ARVRAT: 52 ML/MIN
GLOBULIN SER-MCNC: 2.4 G/DL (ref 2–4)
GLUCOSE SERPL-MCNC: 122 MG/DL (ref 70–99)
HCT VFR BLD CALC: 31.9 % (ref 36–46.5)
HGB BLD-MCNC: 10.8 G/DL (ref 12–15.5)
IRON SATN MFR SERPL: 23 % (ref 15–45)
IRON SERPL-MCNC: 62 MCG/DL (ref 50–170)
LDH SERPL L TO P-CCNC: 288 UNITS/L (ref 82–240)
LYMPHOCYTES # BLD: 2.1 K/MCL (ref 1–4)
LYMPHOCYTES NFR BLD: 26 %
MCH RBC QN AUTO: 33.9 PG (ref 26–34)
MCHC RBC AUTO-ENTMCNC: 33.9 G/DL (ref 32–36.5)
MCV RBC AUTO: 100 FL (ref 78–100)
MONOCYTES # BLD: 0.2 K/MCL (ref 0.3–0.9)
MONOCYTES NFR BLD: 2 %
NEUTROPHILS # BLD: 5.4 K/MCL (ref 1.8–7.7)
NEUTS SEG NFR BLD: 66 %
NRBC BLD MANUAL-RTO: 0 /100 WBC
PLAT MORPH BLD: NORMAL
PLATELET # BLD AUTO: 188 K/MCL (ref 140–450)
POTASSIUM SERPL-SCNC: 3.7 MMOL/L (ref 3.4–5.1)
PROT SERPL-MCNC: 6.3 G/DL (ref 6.4–8.2)
RBC # BLD: 3.19 MIL/MCL (ref 4–5.2)
RBC MORPH BLD: NORMAL
SODIUM SERPL-SCNC: 144 MMOL/L (ref 135–145)
TIBC SERPL-MCNC: 267 MCG/DL (ref 250–450)
WBC # BLD: 8.2 K/MCL (ref 4.2–11)
WBC MORPH BLD: NORMAL

## 2023-01-13 PROCEDURE — 83540 ASSAY OF IRON: CPT | Performed by: INTERNAL MEDICINE

## 2023-01-13 PROCEDURE — 85027 COMPLETE CBC AUTOMATED: CPT | Performed by: INTERNAL MEDICINE

## 2023-01-13 PROCEDURE — 99211 OFF/OP EST MAY X REQ PHY/QHP: CPT

## 2023-01-13 PROCEDURE — 3079F DIAST BP 80-89 MM HG: CPT | Performed by: INTERNAL MEDICINE

## 2023-01-13 PROCEDURE — 99215 OFFICE O/P EST HI 40 MIN: CPT | Performed by: INTERNAL MEDICINE

## 2023-01-13 PROCEDURE — 81206 BCR/ABL1 GENE MAJOR BP: CPT | Performed by: INTERNAL MEDICINE

## 2023-01-13 PROCEDURE — 3077F SYST BP >= 140 MM HG: CPT | Performed by: INTERNAL MEDICINE

## 2023-01-13 PROCEDURE — 83615 LACTATE (LD) (LDH) ENZYME: CPT | Performed by: INTERNAL MEDICINE

## 2023-01-13 PROCEDURE — 80053 COMPREHEN METABOLIC PANEL: CPT | Performed by: INTERNAL MEDICINE

## 2023-01-13 PROCEDURE — 82728 ASSAY OF FERRITIN: CPT | Performed by: INTERNAL MEDICINE

## 2023-01-13 ASSESSMENT — PATIENT HEALTH QUESTIONNAIRE - PHQ9
SUM OF ALL RESPONSES TO PHQ9 QUESTIONS 1 AND 2: 0
1. LITTLE INTEREST OR PLEASURE IN DOING THINGS: NOT AT ALL
2. FEELING DOWN, DEPRESSED OR HOPELESS: NOT AT ALL
CLINICAL INTERPRETATION OF PHQ2 SCORE: NO FURTHER SCREENING NEEDED
SUM OF ALL RESPONSES TO PHQ9 QUESTIONS 1 AND 2: 0

## 2023-01-13 ASSESSMENT — PAIN SCALES - GENERAL: PAINLEVEL: 5

## 2023-01-18 ENCOUNTER — TELEPHONE (OUTPATIENT)
Dept: HEMATOLOGY/ONCOLOGY | Age: 53
End: 2023-01-18

## 2023-01-18 LAB
BCR-ABL1, LOG REDUCTION - NUMERIC: >4.7
SERVICE CMNT-IMP: ABNORMAL
T(ABL1,BCR)P210 BLD/T QL: ABNORMAL
T(ABL1,BCR)P210/CONTROL (IS) BLD/T: <0.002 %

## 2023-01-18 NOTE — TELEPHONE ENCOUNTER
Contacted Delaney FU at [facility] to check status of Botox  Status:Approved with authorization #[id number] valid 1/5/23-7/5/23 for JIMMIE[facility]BILL    Dr. Marny Kanner and Perla Godinez RN notified

## 2023-01-19 ENCOUNTER — TELEPHONE (OUTPATIENT)
Dept: HEMATOLOGY/ONCOLOGY | Age: 53
End: 2023-01-19

## 2023-01-28 ENCOUNTER — HOSPITAL ENCOUNTER (OUTPATIENT)
Facility: HOSPITAL | Age: 53
Setting detail: OBSERVATION
Discharge: HOME | End: 2023-01-31
Attending: EMERGENCY MEDICINE | Admitting: INTERNAL MEDICINE
Payer: COMMERCIAL

## 2023-01-28 DIAGNOSIS — R11.2 NAUSEA AND VOMITING, UNSPECIFIED VOMITING TYPE: ICD-10-CM

## 2023-01-28 DIAGNOSIS — N30.00 ACUTE CYSTITIS WITHOUT HEMATURIA: ICD-10-CM

## 2023-01-28 DIAGNOSIS — R10.13 ABDOMINAL PAIN, EPIGASTRIC: ICD-10-CM

## 2023-01-28 DIAGNOSIS — C92.10 CML (CHRONIC MYELOID LEUKEMIA) (HCC): ICD-10-CM

## 2023-01-28 DIAGNOSIS — E87.6 HYPOKALEMIA: ICD-10-CM

## 2023-01-28 DIAGNOSIS — R55 SYNCOPE, NEAR: Primary | ICD-10-CM

## 2023-01-28 RX ORDER — MORPHINE SULFATE 4 MG/ML
4 INJECTION, SOLUTION INTRAMUSCULAR; INTRAVENOUS ONCE
Status: COMPLETED | OUTPATIENT
Start: 2023-01-28 | End: 2023-01-29

## 2023-01-28 RX ORDER — ONDANSETRON 2 MG/ML
4 INJECTION INTRAMUSCULAR; INTRAVENOUS ONCE
Status: COMPLETED | OUTPATIENT
Start: 2023-01-28 | End: 2023-01-29

## 2023-01-29 ENCOUNTER — APPOINTMENT (OUTPATIENT)
Dept: CT IMAGING | Facility: HOSPITAL | Age: 53
End: 2023-01-29
Attending: EMERGENCY MEDICINE
Payer: COMMERCIAL

## 2023-01-29 ENCOUNTER — APPOINTMENT (OUTPATIENT)
Dept: CV DIAGNOSTICS | Facility: HOSPITAL | Age: 53
End: 2023-01-29
Attending: INTERNAL MEDICINE
Payer: COMMERCIAL

## 2023-01-29 PROBLEM — R55 SYNCOPE, NEAR: Status: ACTIVE | Noted: 2023-01-29

## 2023-01-29 PROBLEM — E87.6 HYPOKALEMIA: Status: ACTIVE | Noted: 2023-01-29

## 2023-01-29 PROBLEM — C92.10 CML (CHRONIC MYELOID LEUKEMIA) (HCC): Status: ACTIVE | Noted: 2023-01-29

## 2023-01-29 PROBLEM — R10.13 ABDOMINAL PAIN, EPIGASTRIC: Status: ACTIVE | Noted: 2023-01-29

## 2023-01-29 PROBLEM — R11.2 NAUSEA AND VOMITING, UNSPECIFIED VOMITING TYPE: Status: ACTIVE | Noted: 2023-01-29

## 2023-01-29 PROBLEM — R73.9 HYPERGLYCEMIA: Status: ACTIVE | Noted: 2023-01-29

## 2023-01-29 PROBLEM — D64.9 ANEMIA: Status: ACTIVE | Noted: 2023-01-29

## 2023-01-29 LAB
ALBUMIN SERPL-MCNC: 3.8 G/DL (ref 3.4–5)
ALBUMIN/GLOB SERPL: 1.5 {RATIO} (ref 1–2)
ALP LIVER SERPL-CCNC: 87 U/L
ALT SERPL-CCNC: 28 U/L
ANION GAP SERPL CALC-SCNC: 12 MMOL/L (ref 0–18)
AST SERPL-CCNC: 59 U/L (ref 15–37)
ATRIAL RATE: 54 BPM
BASOPHILS # BLD AUTO: 0.04 X10(3) UL (ref 0–0.2)
BASOPHILS NFR BLD AUTO: 0.3 %
BILIRUB SERPL-MCNC: 0.6 MG/DL (ref 0.1–2)
BILIRUB UR QL STRIP.AUTO: NEGATIVE
BUN BLD-MCNC: 18 MG/DL (ref 7–18)
CALCIUM BLD-MCNC: 8.8 MG/DL (ref 8.5–10.1)
CHLORIDE SERPL-SCNC: 108 MMOL/L (ref 98–112)
CLARITY UR REFRACT.AUTO: CLEAR
CO2 SERPL-SCNC: 23 MMOL/L (ref 21–32)
COLOR UR AUTO: YELLOW
CREAT BLD-MCNC: 1.28 MG/DL
EOSINOPHIL # BLD AUTO: 0.45 X10(3) UL (ref 0–0.7)
EOSINOPHIL NFR BLD AUTO: 3.6 %
ERYTHROCYTE [DISTWIDTH] IN BLOOD BY AUTOMATED COUNT: 13.3 %
GFR SERPLBLD BASED ON 1.73 SQ M-ARVRAT: 50 ML/MIN/1.73M2 (ref 60–?)
GLOBULIN PLAS-MCNC: 2.5 G/DL (ref 2.8–4.4)
GLUCOSE BLD-MCNC: 162 MG/DL (ref 70–99)
GLUCOSE UR STRIP.AUTO-MCNC: NEGATIVE MG/DL
HCT VFR BLD AUTO: 31.9 %
HGB BLD-MCNC: 11 G/DL
HYALINE CASTS #/AREA URNS AUTO: PRESENT /LPF
HYALINE CASTS #/AREA URNS AUTO: PRESENT /LPF
IMM GRANULOCYTES # BLD AUTO: 0.05 X10(3) UL (ref 0–1)
IMM GRANULOCYTES NFR BLD: 0.4 %
LIPASE SERPL-CCNC: 293 U/L (ref 73–393)
LYMPHOCYTES # BLD AUTO: 2.66 X10(3) UL (ref 1–4)
LYMPHOCYTES NFR BLD AUTO: 21 %
MAGNESIUM SERPL-MCNC: 1.6 MG/DL (ref 1.6–2.6)
MCH RBC QN AUTO: 33.6 PG (ref 26–34)
MCHC RBC AUTO-ENTMCNC: 34.5 G/DL (ref 31–37)
MCV RBC AUTO: 97.6 FL
MONOCYTES # BLD AUTO: 0.57 X10(3) UL (ref 0.1–1)
MONOCYTES NFR BLD AUTO: 4.5 %
NEUTROPHILS # BLD AUTO: 8.9 X10 (3) UL (ref 1.5–7.7)
NEUTROPHILS # BLD AUTO: 8.9 X10(3) UL (ref 1.5–7.7)
NEUTROPHILS NFR BLD AUTO: 70.2 %
NITRITE UR QL STRIP.AUTO: NEGATIVE
OSMOLALITY SERPL CALC.SUM OF ELEC: 301 MOSM/KG (ref 275–295)
P AXIS: 31 DEGREES
P-R INTERVAL: 156 MS
PH UR STRIP.AUTO: 7 [PH] (ref 5–8)
PLATELET # BLD AUTO: 163 10(3)UL (ref 150–450)
POTASSIUM SERPL-SCNC: 2.8 MMOL/L (ref 3.5–5.1)
POTASSIUM SERPL-SCNC: 4.1 MMOL/L (ref 3.5–5.1)
PROT SERPL-MCNC: 6.3 G/DL (ref 6.4–8.2)
PROT UR STRIP.AUTO-MCNC: NEGATIVE MG/DL
Q-T INTERVAL: 354 MS
Q-T INTERVAL: 480 MS
QRS DURATION: 88 MS
QRS DURATION: 92 MS
QTC CALCULATION (BEZET): 374 MS
QTC CALCULATION (BEZET): 455 MS
R AXIS: 20 DEGREES
R AXIS: 53 DEGREES
RBC # BLD AUTO: 3.27 X10(6)UL
SARS-COV-2 RNA RESP QL NAA+PROBE: NOT DETECTED
SODIUM SERPL-SCNC: 143 MMOL/L (ref 136–145)
SP GR UR STRIP.AUTO: 1.02 (ref 1–1.03)
T AXIS: 267 DEGREES
T AXIS: 32 DEGREES
TROPONIN I HIGH SENSITIVITY: 15 NG/L
UROBILINOGEN UR STRIP.AUTO-MCNC: 0.2 MG/DL
VENTRICULAR RATE: 54 BPM
VENTRICULAR RATE: 67 BPM
WBC # BLD AUTO: 12.7 X10(3) UL (ref 4–11)

## 2023-01-29 PROCEDURE — 93306 TTE W/DOPPLER COMPLETE: CPT | Performed by: INTERNAL MEDICINE

## 2023-01-29 PROCEDURE — 74177 CT ABD & PELVIS W/CONTRAST: CPT | Performed by: EMERGENCY MEDICINE

## 2023-01-29 PROCEDURE — 99223 1ST HOSP IP/OBS HIGH 75: CPT | Performed by: INTERNAL MEDICINE

## 2023-01-29 RX ORDER — GARLIC EXTRACT 500 MG
1 CAPSULE ORAL DAILY
COMMUNITY

## 2023-01-29 RX ORDER — POTASSIUM CHLORIDE 1.5 G/1.77G
40 POWDER, FOR SOLUTION ORAL EVERY 4 HOURS
Status: COMPLETED | OUTPATIENT
Start: 2023-01-29 | End: 2023-01-29

## 2023-01-29 RX ORDER — ENOXAPARIN SODIUM 100 MG/ML
40 INJECTION SUBCUTANEOUS DAILY
Status: DISCONTINUED | OUTPATIENT
Start: 2023-01-29 | End: 2023-01-31

## 2023-01-29 RX ORDER — POTASSIUM CHLORIDE 20 MEQ/1
40 TABLET, EXTENDED RELEASE ORAL ONCE
Status: DISCONTINUED | OUTPATIENT
Start: 2023-01-29 | End: 2023-01-29

## 2023-01-29 RX ORDER — HYDROMORPHONE HYDROCHLORIDE 1 MG/ML
0.5 INJECTION, SOLUTION INTRAMUSCULAR; INTRAVENOUS; SUBCUTANEOUS EVERY 30 MIN PRN
Status: ACTIVE | OUTPATIENT
Start: 2023-01-29 | End: 2023-01-29

## 2023-01-29 RX ORDER — HYDROMORPHONE HYDROCHLORIDE 1 MG/ML
0.5 INJECTION, SOLUTION INTRAMUSCULAR; INTRAVENOUS; SUBCUTANEOUS ONCE
Status: COMPLETED | OUTPATIENT
Start: 2023-01-29 | End: 2023-01-29

## 2023-01-29 RX ORDER — MELATONIN
3 NIGHTLY PRN
Status: DISCONTINUED | OUTPATIENT
Start: 2023-01-29 | End: 2023-01-31

## 2023-01-29 RX ORDER — DOCUSATE SODIUM 100 MG/1
100 CAPSULE, LIQUID FILLED ORAL DAILY
COMMUNITY

## 2023-01-29 RX ORDER — ACETAMINOPHEN 500 MG
500 TABLET ORAL EVERY 4 HOURS PRN
Status: DISCONTINUED | OUTPATIENT
Start: 2023-01-29 | End: 2023-01-31

## 2023-01-29 RX ORDER — SODIUM CHLORIDE 9 MG/ML
INJECTION, SOLUTION INTRAVENOUS CONTINUOUS
Status: ACTIVE | OUTPATIENT
Start: 2023-01-29 | End: 2023-01-29

## 2023-01-29 RX ORDER — ONDANSETRON 2 MG/ML
4 INJECTION INTRAMUSCULAR; INTRAVENOUS EVERY 4 HOURS PRN
Status: ACTIVE | OUTPATIENT
Start: 2023-01-29 | End: 2023-01-29

## 2023-01-29 RX ORDER — SODIUM CHLORIDE 9 MG/ML
INJECTION, SOLUTION INTRAVENOUS CONTINUOUS
Status: DISCONTINUED | OUTPATIENT
Start: 2023-01-29 | End: 2023-01-31

## 2023-01-29 RX ORDER — IMATINIB MESYLATE 100 MG/1
300 TABLET, FILM COATED ORAL 2 TIMES DAILY
Status: DISCONTINUED | OUTPATIENT
Start: 2023-01-29 | End: 2023-01-31

## 2023-01-29 RX ORDER — METOPROLOL SUCCINATE 25 MG/1
25 TABLET, EXTENDED RELEASE ORAL
Status: DISCONTINUED | OUTPATIENT
Start: 2023-01-29 | End: 2023-01-31

## 2023-01-29 RX ORDER — ONDANSETRON 2 MG/ML
4 INJECTION INTRAMUSCULAR; INTRAVENOUS EVERY 6 HOURS PRN
Status: DISCONTINUED | OUTPATIENT
Start: 2023-01-29 | End: 2023-01-31

## 2023-01-29 RX ORDER — GARLIC EXTRACT 500 MG
1 CAPSULE ORAL DAILY
Status: DISCONTINUED | OUTPATIENT
Start: 2023-01-29 | End: 2023-01-31

## 2023-01-29 RX ORDER — POTASSIUM CHLORIDE 14.9 MG/ML
20 INJECTION INTRAVENOUS ONCE
Status: COMPLETED | OUTPATIENT
Start: 2023-01-29 | End: 2023-01-29

## 2023-01-29 RX ORDER — ASPIRIN 81 MG/1
81 TABLET, CHEWABLE ORAL
Status: DISCONTINUED | OUTPATIENT
Start: 2023-01-29 | End: 2023-01-31

## 2023-01-29 RX ORDER — LISINOPRIL 40 MG/1
40 TABLET ORAL DAILY
Status: DISCONTINUED | OUTPATIENT
Start: 2023-01-29 | End: 2023-01-31

## 2023-01-29 NOTE — ED QUICK NOTES
Pt declined taking PO potassium d/t intermittent nausea and no food in her stomach. Only administered IV potassium.

## 2023-01-29 NOTE — PROGRESS NOTES
NURSING ADMISSION NOTE      Patient admitted via cart at approx 0350. Oriented to room. Safety precautions initiated. Bed in low position. Call light in reach. Patient A&Ox4. Tele SR, on room air. Mid upper abd pain 6/10. Replacing K, after 1st dose patient states abd pain improved to 1/10. CT abd completed, results pending. Orthostatic BP negative. IV abx for UTI.  ECHO TBD. Patient updated on POC.

## 2023-01-29 NOTE — ED QUICK NOTES
INTERIM HISTORY:  Ms. Manzano follows up 3 month(s) post op from left first metatarsal distal biplanar chevron osteotomy, DOS 1/12/18. She was last seen about 6 weeks ago on 3/1/18 at which point it was recommended that she transition to heel toe weightbearing in the boot as tolerated and transition to a shoe. She reports ongoing pain and has been unable to bear since the way to the ball the foot. She is still wearing the walking boot. She points to the plantar aspect of the first metatarsal head extending to the plantar proximal phalanx as the main area pain. She also complains of the great toe drifting laterally over the second toe. she denies new onset chest pain or shortness of breath. Reports she has been massaging the area and trying to wiggle her toes however she has not undergone formal physical therapy. She takes gabapentin and cyclobenzaprine on occasion however not consistently.    PHYSICAL EXAMINATION:  There were no vitals taken for this visit.  Incision is Healed  Palpable dorsalis pedis pulse. Sensation intact to light touch in both the superficial and deep peroneal nerve distributions as well as tibial. Able to actively plantarflex, dorsiflex, invert and link the hindfoot with good strength.  Minimal active motion of the great toe secondary to pain.  Pain with passive motion of the great toe.  There is mild lateral deviation of the great toe with medial deviation of the second and third toes. There is overlapping of the first and second toes.  Tenderness is tenderness to palpation at the first MTP joint diffusely as well as plantarly beneath the first proximal phalanx.  Pain with passive extension of the second toe.      IMAGING  Images reviewed.  Radiographs of the left foot from today show no evidence hardware failure or loosening. The first metatarsal osteotomy appears healed.    PLAN:  She is now 3 months out from the operation and continues to have significant pain. Her osteotomy appears healed  Called and spoke with Charge RN on CTU 7th FL to inform nurse that there is an outstanding order for CT to be done per Dr. Freda Foster and I question whether a portion of her pain is nerve related. Recommend that she continue to weight-bear as tolerated in the boot. She should take the gabapentin 100 mg 3 times a day and cyclobenzaprine 10 mg 3 times a day for the next 3 weeks. Recommend physical therapy to work on range of motion of the great toe and desensitization, a referral was placed today. We will have her follow up in 3 weeks for reevaluation. If she has ongoing pain she may require another operation however I am hesitant to do this given her nerve related symptoms. Possible future surgical intervention may include first proximal phalanx akin osteotomy and possible something to correct the medial deviation of the second and third toes.    I, Alo Renee PA-C, personally performed a history and physical exam. I then acted as scribe for Matt Duron M.D. as he performed a history and physical exam and discussed impression and recommendations with the patient.    I, Matt Duron MD, attest that I performed all of the work during this encounter and that the scribe only recorded my findings.     Unfortunately she is still having quite a bit of pain and difficulty moving not only the first toe, but the lesser toes as well.  She guards severely against any passive movement of the lesser toes and the great toe.  She says she is working on motion and desensitization (rubbing) but I would like her to work with a therapist to these ends a bit.  We will keep a close eye on her with follow up in 3 weeks.

## 2023-01-29 NOTE — PROGRESS NOTES
01/29/23 0555 01/29/23 0557 01/29/23 0559   Vital Signs   Pulse 61 60 70   BP (!) 165/64 (!) 172/64 (!) 164/68   MAP (mmHg) 89 91 93   Patient Position Lying Sitting Standing

## 2023-01-29 NOTE — PLAN OF CARE
Assumed care @0730  VSS,   A&Ox4,   RA    NSR ,   Denies Pain,   Up with standby assist as tolerated. Tolerating Regular soft diet. Intake and outputs w/d/l. IVF infusing Le@[facility]  Dietician to see    Updated POC with patient and family. Will continue to monitor.        Problem: PAIN - ADULT  Goal: Verbalizes/displays adequate comfort level or patient's stated pain goal  Description: INTERVENTIONS:  - Encourage pt to monitor pain and request assistance  - Assess pain using appropriate pain scale  - Administer analgesics based on type and severity of pain and evaluate response  - Implement non-pharmacological measures as appropriate and evaluate response  - Consider cultural and social influences on pain and pain management  - Manage/alleviate anxiety  - Utilize distraction and/or relaxation techniques  - Monitor for opioid side effects  - Notify MD/LIP if interventions unsuccessful or patient reports new pain  - Anticipate increased pain with activity and pre-medicate as appropriate  Outcome: Progressing     Problem: METABOLIC/FLUID AND ELECTROLYTES - ADULT  Goal: Electrolytes maintained within normal limits  Description: INTERVENTIONS:  - Monitor labs and rhythm and assess patient for signs and symptoms of electrolyte imbalances  - Administer electrolyte replacement as ordered  - Monitor response to electrolyte replacements, including rhythm and repeat lab results as appropriate  - Fluid restriction as ordered  - Instruct patient on fluid and nutrition restrictions as appropriate  Outcome: Progressing     Problem: NEUROLOGICAL - ADULT  Goal: Achieves maximal functionality and self care  Description: INTERVENTIONS  - Monitor swallowing and airway patency with patient fatigue and changes in neurological status  - Encourage and assist patient to increase activity and self care with guidance from PT/OT  - Encourage visually impaired, hearing impaired and aphasic patients to use assistive/communication devices  Outcome: Progressing

## 2023-01-30 LAB
ANION GAP SERPL CALC-SCNC: 3 MMOL/L (ref 0–18)
BUN BLD-MCNC: 12 MG/DL (ref 7–18)
CALCIUM BLD-MCNC: 7.9 MG/DL (ref 8.5–10.1)
CHLORIDE SERPL-SCNC: 112 MMOL/L (ref 98–112)
CO2 SERPL-SCNC: 27 MMOL/L (ref 21–32)
CREAT BLD-MCNC: 0.88 MG/DL
GFR SERPLBLD BASED ON 1.73 SQ M-ARVRAT: 78 ML/MIN/1.73M2 (ref 60–?)
GLUCOSE BLD-MCNC: 105 MG/DL (ref 70–99)
OSMOLALITY SERPL CALC.SUM OF ELEC: 294 MOSM/KG (ref 275–295)
POTASSIUM SERPL-SCNC: 3.7 MMOL/L (ref 3.5–5.1)
PROCALCITONIN SERPL-MCNC: 0.08 NG/ML (ref ?–0.16)
SODIUM SERPL-SCNC: 142 MMOL/L (ref 136–145)

## 2023-01-30 PROCEDURE — 99233 SBSQ HOSP IP/OBS HIGH 50: CPT | Performed by: HOSPITALIST

## 2023-01-30 RX ORDER — DOCUSATE SODIUM 100 MG/1
100 CAPSULE, LIQUID FILLED ORAL 2 TIMES DAILY
Status: DISCONTINUED | OUTPATIENT
Start: 2023-01-30 | End: 2023-01-31

## 2023-01-30 RX ORDER — POTASSIUM CHLORIDE 20 MEQ/1
40 TABLET, EXTENDED RELEASE ORAL ONCE
Status: DISCONTINUED | OUTPATIENT
Start: 2023-01-30 | End: 2023-01-30 | Stop reason: DRUGHIGH

## 2023-01-30 RX ORDER — SENNOSIDES 8.6 MG
8.6 TABLET ORAL 2 TIMES DAILY
Status: DISCONTINUED | OUTPATIENT
Start: 2023-01-30 | End: 2023-01-31

## 2023-01-30 RX ORDER — POTASSIUM CHLORIDE 14.9 MG/ML
20 INJECTION INTRAVENOUS ONCE
Status: COMPLETED | OUTPATIENT
Start: 2023-01-30 | End: 2023-01-30

## 2023-01-30 NOTE — PROGRESS NOTES
Assumed care at [address]. Patient A&Ox4. Tele SR, on room air. Denies abd pain. IVF infusing. C/o constipation, PRN dulcolax ordered.

## 2023-01-30 NOTE — PLAN OF CARE
Assumed care @0730  VSS,   A&Ox4,   RA    NSR ,   Denies Pain,   Up with standby assist as tolerated. Tolerating Regular soft diet. great urine output. No BM. Colace/senna given. IVF infusing [email or web address]     Updated POC with patient and family. Will continue to monitor.         Problem: PAIN - ADULT  Goal: Verbalizes/displays adequate comfort level or patient's stated pain goal  Description: INTERVENTIONS:  - Encourage pt to monitor pain and request assistance  - Assess pain using appropriate pain scale  - Administer analgesics based on type and severity of pain and evaluate response  - Implement non-pharmacological measures as appropriate and evaluate response  - Consider cultural and social influences on pain and pain management  - Manage/alleviate anxiety  - Utilize distraction and/or relaxation techniques  - Monitor for opioid side effects  - Notify MD/LIP if interventions unsuccessful or patient reports new pain  - Anticipate increased pain with activity and pre-medicate as appropriate  Outcome: Progressing     Problem: METABOLIC/FLUID AND ELECTROLYTES - ADULT  Goal: Electrolytes maintained within normal limits  Description: INTERVENTIONS:  - Monitor labs and rhythm and assess patient for signs and symptoms of electrolyte imbalances  - Administer electrolyte replacement as ordered  - Monitor response to electrolyte replacements, including rhythm and repeat lab results as appropriate  - Fluid restriction as ordered  - Instruct patient on fluid and nutrition restrictions as appropriate  Outcome: Progressing     Problem: NEUROLOGICAL - ADULT  Goal: Achieves maximal functionality and self care  Description: INTERVENTIONS  - Monitor swallowing and airway patency with patient fatigue and changes in neurological status  - Encourage and assist patient to increase activity and self care with guidance from PT/OT  - Encourage visually impaired, hearing impaired and aphasic patients to use assistive/communication devices  Outcome: Progressing Plastic Surgeon Procedure Text (A): After obtaining clear surgical margins the patient was sent to plastics for surgical repair.  The patient understands they will receive post-surgical care and follow-up from the referring physician's office.

## 2023-01-31 VITALS
HEART RATE: 68 BPM | OXYGEN SATURATION: 99 % | RESPIRATION RATE: 18 BRPM | TEMPERATURE: 98 F | HEIGHT: 62 IN | SYSTOLIC BLOOD PRESSURE: 137 MMHG | WEIGHT: 155 LBS | BODY MASS INDEX: 28.52 KG/M2 | DIASTOLIC BLOOD PRESSURE: 64 MMHG

## 2023-01-31 DIAGNOSIS — C92.11 BCR/ABL1-POSITIVE CHRONIC MYELOID LEUKEMIA (CML) IN REMISSION (CMD): ICD-10-CM

## 2023-01-31 LAB — POTASSIUM SERPL-SCNC: 3.6 MMOL/L (ref 3.5–5.1)

## 2023-01-31 PROCEDURE — 99243 OFF/OP CNSLTJ NEW/EST LOW 30: CPT | Performed by: PHYSICIAN ASSISTANT

## 2023-01-31 PROCEDURE — 99239 HOSP IP/OBS DSCHRG MGMT >30: CPT | Performed by: HOSPITALIST

## 2023-01-31 RX ORDER — IMATINIB MESYLATE 100 MG/1
TABLET, FILM COATED ORAL
Qty: 180 TABLET | Refills: 6 | Status: SHIPPED | OUTPATIENT
Start: 2023-01-31 | End: 2023-08-29

## 2023-01-31 RX ORDER — POLYETHYLENE GLYCOL 3350 17 G/17G
17 POWDER, FOR SOLUTION ORAL DAILY
Qty: 1 PACKET | Refills: 0 | Status: SHIPPED | OUTPATIENT
Start: 2023-02-01

## 2023-01-31 RX ORDER — POTASSIUM CHLORIDE 20 MEQ/1
40 TABLET, EXTENDED RELEASE ORAL EVERY 4 HOURS
Status: COMPLETED | OUTPATIENT
Start: 2023-01-31 | End: 2023-01-31

## 2023-01-31 RX ORDER — POLYETHYLENE GLYCOL 3350 17 G/17G
17 POWDER, FOR SOLUTION ORAL DAILY
Status: DISCONTINUED | OUTPATIENT
Start: 2023-01-31 | End: 2023-01-31

## 2023-01-31 NOTE — PLAN OF CARE
Assumed care of patient at 0730  A&ox4, room air, NSR on tele, VSS   Denies pain. Up with standby assist  Voiding clear/yellow urine  No BM this shift; mirilax and stool softeners given  All consults okay with discharge home. NURSING DISCHARGE NOTE    Discharged Home via Wheelchair. Accompanied by Family member and Support staff  Belongings Taken by patient/family. All discharge paperwork, follow up appointments, and discharge medications discussed with patient and family. All questions answered. All IVs removed, tele removed, bedside belongings packed, and sent with patient. Home medication, Gleevec, sent home with patient. Patient and family verbalize understanding of discharge plan. All consults okay with discharge home. Patient discharged home with family.          Problem: PAIN - ADULT  Goal: Verbalizes/displays adequate comfort level or patient's stated pain goal  Description: INTERVENTIONS:  - Encourage pt to monitor pain and request assistance  - Assess pain using appropriate pain scale  - Administer analgesics based on type and severity of pain and evaluate response  - Implement non-pharmacological measures as appropriate and evaluate response  - Consider cultural and social influences on pain and pain management  - Manage/alleviate anxiety  - Utilize distraction and/or relaxation techniques  - Monitor for opioid side effects  - Notify MD/LIP if interventions unsuccessful or patient reports new pain  - Anticipate increased pain with activity and pre-medicate as appropriate  Outcome: Adequate for Discharge     Problem: METABOLIC/FLUID AND ELECTROLYTES - ADULT  Goal: Electrolytes maintained within normal limits  Description: INTERVENTIONS:  - Monitor labs and rhythm and assess patient for signs and symptoms of electrolyte imbalances  - Administer electrolyte replacement as ordered  - Monitor response to electrolyte replacements, including rhythm and repeat lab results as appropriate  - Fluid restriction as ordered  - Instruct patient on fluid and nutrition restrictions as appropriate  Outcome: Adequate for Discharge     Problem: NEUROLOGICAL - ADULT  Goal: Achieves maximal functionality and self care  Description: INTERVENTIONS  - Monitor swallowing and airway patency with patient fatigue and changes in neurological status  - Encourage and assist patient to increase activity and self care with guidance from PT/OT  - Encourage visually impaired, hearing impaired and aphasic patients to use assistive/communication devices  Outcome: Adequate for Discharge

## 2023-01-31 NOTE — PLAN OF CARE
Problem: PAIN - ADULT  Goal: Verbalizes/displays adequate comfort level or patient's stated pain goal  Description: INTERVENTIONS:  - Encourage pt to monitor pain and request assistance  - Assess pain using appropriate pain scale  - Administer analgesics based on type and severity of pain and evaluate response  - Implement non-pharmacological measures as appropriate and evaluate response  - Consider cultural and social influences on pain and pain management  - Manage/alleviate anxiety  - Utilize distraction and/or relaxation techniques  - Monitor for opioid side effects  - Notify MD/LIP if interventions unsuccessful or patient reports new pain  - Anticipate increased pain with activity and pre-medicate as appropriate  Outcome: Progressing     Problem: METABOLIC/FLUID AND ELECTROLYTES - ADULT  Goal: Electrolytes maintained within normal limits  Description: INTERVENTIONS:  - Monitor labs and rhythm and assess patient for signs and symptoms of electrolyte imbalances  - Administer electrolyte replacement as ordered  - Monitor response to electrolyte replacements, including rhythm and repeat lab results as appropriate  - Fluid restriction as ordered  - Instruct patient on fluid and nutrition restrictions as appropriate  Outcome: Progressing     Problem: NEUROLOGICAL - ADULT  Goal: Achieves maximal functionality and self care  Description: INTERVENTIONS  - Monitor swallowing and airway patency with patient fatigue and changes in neurological status  - Encourage and assist patient to increase activity and self care with guidance from PT/OT  - Encourage visually impaired, hearing impaired and aphasic patients to use assistive/communication devices  Outcome: Progressing

## 2023-02-01 ENCOUNTER — PATIENT OUTREACH (OUTPATIENT)
Dept: CASE MANAGEMENT | Age: 53
End: 2023-02-01

## 2023-02-01 DIAGNOSIS — Z02.9 ENCOUNTERS FOR UNSPECIFIED ADMINISTRATIVE PURPOSE: ICD-10-CM

## 2023-02-06 ENCOUNTER — OFFICE VISIT (OUTPATIENT)
Dept: INTERNAL MEDICINE CLINIC | Facility: CLINIC | Age: 53
End: 2023-02-06
Payer: COMMERCIAL

## 2023-02-06 VITALS
OXYGEN SATURATION: 98 % | HEART RATE: 79 BPM | SYSTOLIC BLOOD PRESSURE: 138 MMHG | WEIGHT: 156 LBS | BODY MASS INDEX: 28.71 KG/M2 | DIASTOLIC BLOOD PRESSURE: 76 MMHG | HEIGHT: 62 IN | RESPIRATION RATE: 18 BRPM | TEMPERATURE: 98 F

## 2023-02-06 DIAGNOSIS — K60.2 ANAL FISSURE: ICD-10-CM

## 2023-02-06 DIAGNOSIS — R55 SYNCOPE, NEAR: ICD-10-CM

## 2023-02-06 DIAGNOSIS — I48.91 ATRIAL FIBRILLATION, UNSPECIFIED TYPE (HCC): Primary | ICD-10-CM

## 2023-02-06 DIAGNOSIS — I10 ESSENTIAL HYPERTENSION, BENIGN: ICD-10-CM

## 2023-02-06 PROBLEM — E78.6 LOW HDL (UNDER 40): Status: RESOLVED | Noted: 2019-06-14 | Resolved: 2023-02-06

## 2023-02-06 PROBLEM — R00.2 PALPITATIONS: Status: RESOLVED | Noted: 2020-11-11 | Resolved: 2023-02-06

## 2023-02-06 PROCEDURE — 3078F DIAST BP <80 MM HG: CPT | Performed by: NURSE PRACTITIONER

## 2023-02-06 PROCEDURE — 3075F SYST BP GE 130 - 139MM HG: CPT | Performed by: NURSE PRACTITIONER

## 2023-02-06 PROCEDURE — 99495 TRANSJ CARE MGMT MOD F2F 14D: CPT | Performed by: NURSE PRACTITIONER

## 2023-02-06 PROCEDURE — 3008F BODY MASS INDEX DOCD: CPT | Performed by: NURSE PRACTITIONER

## 2023-02-06 NOTE — PROGRESS NOTES
Multiple attempts to reach pt and messages left with no return call. Patient went in for HFU appt with [facility] on 2/6/23. Encounter closing.

## 2023-02-09 ENCOUNTER — TELEPHONE (OUTPATIENT)
Facility: LOCATION | Age: 53
End: 2023-02-09

## 2023-02-09 DIAGNOSIS — K60.2 ANAL FISSURE: Primary | ICD-10-CM

## 2023-02-14 ENCOUNTER — OFFICE VISIT (OUTPATIENT)
Dept: FAMILY MEDICINE CLINIC | Facility: CLINIC | Age: 53
End: 2023-02-14
Payer: COMMERCIAL

## 2023-02-14 VITALS
OXYGEN SATURATION: 99 % | TEMPERATURE: 97 F | SYSTOLIC BLOOD PRESSURE: 118 MMHG | WEIGHT: 161.63 LBS | HEIGHT: 62 IN | HEART RATE: 74 BPM | RESPIRATION RATE: 16 BRPM | DIASTOLIC BLOOD PRESSURE: 80 MMHG | BODY MASS INDEX: 29.74 KG/M2

## 2023-02-14 DIAGNOSIS — E87.6 HYPOKALEMIA: ICD-10-CM

## 2023-02-14 DIAGNOSIS — K60.2 ANAL FISSURE: ICD-10-CM

## 2023-02-14 DIAGNOSIS — I10 ESSENTIAL HYPERTENSION, BENIGN: ICD-10-CM

## 2023-02-14 DIAGNOSIS — I48.91 ATRIAL FIBRILLATION, UNSPECIFIED TYPE (HCC): ICD-10-CM

## 2023-02-14 DIAGNOSIS — C92.10 CML (CHRONIC MYELOID LEUKEMIA) (HCC): ICD-10-CM

## 2023-02-14 DIAGNOSIS — R10.13 ABDOMINAL PAIN, EPIGASTRIC: ICD-10-CM

## 2023-02-14 DIAGNOSIS — R55 NEAR SYNCOPE: Primary | ICD-10-CM

## 2023-02-14 DIAGNOSIS — D63.8 ANEMIA OF CHRONIC DISEASE: ICD-10-CM

## 2023-02-14 PROCEDURE — 3079F DIAST BP 80-89 MM HG: CPT | Performed by: NURSE PRACTITIONER

## 2023-02-14 PROCEDURE — 3008F BODY MASS INDEX DOCD: CPT | Performed by: NURSE PRACTITIONER

## 2023-02-14 PROCEDURE — 99214 OFFICE O/P EST MOD 30 MIN: CPT | Performed by: NURSE PRACTITIONER

## 2023-02-14 PROCEDURE — 3074F SYST BP LT 130 MM HG: CPT | Performed by: NURSE PRACTITIONER

## 2023-02-14 RX ORDER — LISINOPRIL 40 MG/1
40 TABLET ORAL DAILY
Qty: 90 TABLET | Refills: 1 | Status: SHIPPED | OUTPATIENT
Start: 2023-02-14

## 2023-02-16 ENCOUNTER — OFFICE VISIT (OUTPATIENT)
Facility: LOCATION | Age: 53
End: 2023-02-16
Payer: COMMERCIAL

## 2023-02-16 VITALS — TEMPERATURE: 97 F | HEART RATE: 79 BPM

## 2023-02-16 DIAGNOSIS — K60.2 ANAL FISSURE: Primary | ICD-10-CM

## 2023-02-16 PROCEDURE — 99213 OFFICE O/P EST LOW 20 MIN: CPT | Performed by: STUDENT IN AN ORGANIZED HEALTH CARE EDUCATION/TRAINING PROGRAM

## 2023-02-17 ENCOUNTER — PATIENT MESSAGE (OUTPATIENT)
Dept: FAMILY MEDICINE CLINIC | Facility: CLINIC | Age: 53
End: 2023-02-17

## 2023-02-17 NOTE — TELEPHONE ENCOUNTER
From: Javi Khalil  To: Billy Cantor NP  Sent: 2/17/2023 12:03 PM CST  Subject: ordered a complete metabolic panel     Dear BEATRICE Ruano,    I see you ordered a complete metabolic panel test for   me to take, Just was wondering where i would go to get   that done? Can i just walk into [address] [place] [address] where you had my last labs done? ?    Id like to get this done next week since my surgery with Dr. Marny Kanner is March 3rd. Please let me know as soon as you can, I forgot to ask you when I saw you on Tues. Feb. 14th.     Thanks [facility]

## 2023-02-21 ENCOUNTER — TELEPHONE (OUTPATIENT)
Facility: LOCATION | Age: 53
End: 2023-02-21

## 2023-02-21 ENCOUNTER — LAB ENCOUNTER (OUTPATIENT)
Dept: LAB | Age: 53
End: 2023-02-21
Attending: NURSE PRACTITIONER
Payer: COMMERCIAL

## 2023-02-21 DIAGNOSIS — E87.6 HYPOKALEMIA: ICD-10-CM

## 2023-02-21 DIAGNOSIS — R94.4 DECREASED GFR: Primary | ICD-10-CM

## 2023-02-21 LAB
ALBUMIN SERPL-MCNC: 3.8 G/DL (ref 3.4–5)
ALBUMIN/GLOB SERPL: 1.5 {RATIO} (ref 1–2)
ALP LIVER SERPL-CCNC: 55 U/L
ALT SERPL-CCNC: 18 U/L
ANION GAP SERPL CALC-SCNC: 5 MMOL/L (ref 0–18)
AST SERPL-CCNC: 21 U/L (ref 15–37)
BILIRUB SERPL-MCNC: 0.4 MG/DL (ref 0.1–2)
BUN BLD-MCNC: 14 MG/DL (ref 7–18)
CALCIUM BLD-MCNC: 8.6 MG/DL (ref 8.5–10.1)
CHLORIDE SERPL-SCNC: 111 MMOL/L (ref 98–112)
CO2 SERPL-SCNC: 27 MMOL/L (ref 21–32)
CREAT BLD-MCNC: 1.15 MG/DL
FASTING STATUS PATIENT QL REPORTED: YES
GFR SERPLBLD BASED ON 1.73 SQ M-ARVRAT: 57 ML/MIN/1.73M2 (ref 60–?)
GLOBULIN PLAS-MCNC: 2.6 G/DL (ref 2.8–4.4)
GLUCOSE BLD-MCNC: 101 MG/DL (ref 70–99)
OSMOLALITY SERPL CALC.SUM OF ELEC: 297 MOSM/KG (ref 275–295)
POTASSIUM SERPL-SCNC: 3.7 MMOL/L (ref 3.5–5.1)
PROT SERPL-MCNC: 6.4 G/DL (ref 6.4–8.2)
SODIUM SERPL-SCNC: 143 MMOL/L (ref 136–145)

## 2023-02-21 PROCEDURE — 80053 COMPREHEN METABOLIC PANEL: CPT

## 2023-02-21 PROCEDURE — 36415 COLL VENOUS BLD VENIPUNCTURE: CPT

## 2023-02-21 NOTE — TELEPHONE ENCOUNTER
Transaction ID: [id number] ID: [id number] Date: 2023-02-21  Wilian Muse Patient  Member ID  [id number]    Date of Birth  1970-07-09    Gender  Female    Relationship to [address] Name  Paige Rubalcava    Transaction Type  Outpatient Authorization    Organization  [address] [place] [address]    [address] logo  Certificate Information  Reference Number  [id number]    Status  NO ACTION REQUIRED    Message  Requested Service does not require preauthorization. We would strongly encourage you to check benefits for this service.     Member Information  Patient Name  Wilian Muse    Patient Date of Birth  [id number]    Patient Gender  Female    Member ID  [id number]    Relationship to [address] Name  Paige Rubalcava    Requesting Provider     Name  Jacinda Oumou  [id number]    Tax Id  [id number]    Specialty  [id number]  Provider Role  Provider    Address  [facility] [address] [address]    Phone  [phone]  Fax  [phone]    Contact Name  Stephen Ramona    Service Information  Service Type  2 - Surgical    Place of Service  [address] [place] [address] From - To Date  2023-03-03 - 2023-06-03    Level of Service  Elective    Diagnosis Code 1  K602 - Anal fissure unspecified    Procedure Code 1 (CPT/HCPCS)  46098 - SURG DX EXAM ANORECTAL    Quantity  1 Units    Status  NO ACTION REQUIRED    Procedure Code 2 (CPT/HCPCS)  91178 - INCISION OF ANAL SPHINCTER    Quantity  1 Units    Status  NO ACTION REQUIRED    Procedure Code 3 (CPT/HCPCS)  02565 - CHEMODENERVATION ANAL MUSC    Quantity  1 Units    Status  NO ACTION REQUIRED    Rendering Provider/Facility     Provider 1  Name  [address] [address]  [id number]    Specialty  [id number]  Provider Role  Attending    Address  [address] [place] [address] [address]    Provider 2  Name  [facility]  [id number]    Provider Role  Facility    Address  [facility], [address] [place] [address], [address]

## 2023-02-23 ENCOUNTER — PATIENT MESSAGE (OUTPATIENT)
Dept: FAMILY MEDICINE CLINIC | Facility: CLINIC | Age: 53
End: 2023-02-23

## 2023-02-23 NOTE — TELEPHONE ENCOUNTER
He saw Bennett aFye earlier this month. I have not seen her since October so I cannot give clearance without a visit at this time. I do not know if Bennett Faye is willing to give clearance as she was in for several problems at that visit.   I do not think he realized that it was more complicated than a simple letter of release that she is asking for

## 2023-02-23 NOTE — TELEPHONE ENCOUNTER
I do not see breast cancer listed anywhere on her problem list, so I do not know where that nurse was seeing that. Also, our visit was not a pre-op clearance which requires specific documentation both from us and a request from the surgeon. As far as I know we did not get this documentation. I did not do a pre-op evaluation so I cannot send this letter.

## 2023-02-23 NOTE — TELEPHONE ENCOUNTER
From: Angelina Koo  To: Kaiser Angela NP  Sent: 2/23/2023 9:51 AM CST  Subject: test result on metabolic panel question? Dear Vicente Costa,   I know you sent me a message about my results a couple days ago. . I know you want me to take the test again in a couple of weeks according to your message. Just curious if you or Dr. Katerine Townsend sent a release for me to have my surgery on March 3rd for my anal fissure. In speaking with  he said he said a letter of release. The only reason I am questioning it is because I spoke with a surgical nurse and she was asking me if my doctor released me ? I said as far as i know yes. Also for some odd reason the nurse questioning me   said my chart is showing that I have breast cancer and I denied it because she could not take it out of my chart. I know I have dense breasts, but that should be removed. If you could also check into that . One more thing with the kidney issue you said to make sure I am drinking plenty of water, in response. I have been drinking the required amount 6-8 glasses of 8 oz. daily, just an fyi , & sometimes more. I wanted to let you know that.  Thanks [facility]

## 2023-02-23 NOTE — TELEPHONE ENCOUNTER
Pt will be seen for pre-op clearance on 2/27/2023. Confirmed with PAT that no pre-op testing is required. Medical clearance only. Patient verbalized understanding and agrees with plan.

## 2023-02-27 ENCOUNTER — OFFICE VISIT (OUTPATIENT)
Dept: FAMILY MEDICINE CLINIC | Facility: CLINIC | Age: 53
End: 2023-02-27
Payer: COMMERCIAL

## 2023-02-27 VITALS
DIASTOLIC BLOOD PRESSURE: 64 MMHG | WEIGHT: 161 LBS | BODY MASS INDEX: 29.63 KG/M2 | SYSTOLIC BLOOD PRESSURE: 132 MMHG | OXYGEN SATURATION: 98 % | HEIGHT: 62 IN | HEART RATE: 77 BPM

## 2023-02-27 DIAGNOSIS — Z01.818 PREOPERATIVE GENERAL PHYSICAL EXAMINATION: Primary | ICD-10-CM

## 2023-02-27 PROCEDURE — 3078F DIAST BP <80 MM HG: CPT | Performed by: STUDENT IN AN ORGANIZED HEALTH CARE EDUCATION/TRAINING PROGRAM

## 2023-02-27 PROCEDURE — 3008F BODY MASS INDEX DOCD: CPT | Performed by: STUDENT IN AN ORGANIZED HEALTH CARE EDUCATION/TRAINING PROGRAM

## 2023-02-27 PROCEDURE — 99243 OFF/OP CNSLTJ NEW/EST LOW 30: CPT | Performed by: STUDENT IN AN ORGANIZED HEALTH CARE EDUCATION/TRAINING PROGRAM

## 2023-02-27 PROCEDURE — 3075F SYST BP GE 130 - 139MM HG: CPT | Performed by: STUDENT IN AN ORGANIZED HEALTH CARE EDUCATION/TRAINING PROGRAM

## 2023-02-28 ENCOUNTER — LAB ENCOUNTER (OUTPATIENT)
Dept: LAB | Age: 53
End: 2023-02-28
Attending: STUDENT IN AN ORGANIZED HEALTH CARE EDUCATION/TRAINING PROGRAM
Payer: COMMERCIAL

## 2023-02-28 DIAGNOSIS — Z01.818 PRE-OP TESTING: ICD-10-CM

## 2023-03-02 LAB — SARS-COV-2 RNA RESP QL NAA+PROBE: NOT DETECTED

## 2023-03-03 ENCOUNTER — ANESTHESIA (OUTPATIENT)
Dept: SURGERY | Facility: HOSPITAL | Age: 53
End: 2023-03-03
Payer: COMMERCIAL

## 2023-03-03 ENCOUNTER — ANESTHESIA EVENT (OUTPATIENT)
Dept: SURGERY | Facility: HOSPITAL | Age: 53
End: 2023-03-03
Payer: COMMERCIAL

## 2023-03-03 ENCOUNTER — HOSPITAL ENCOUNTER (OUTPATIENT)
Facility: HOSPITAL | Age: 53
Setting detail: HOSPITAL OUTPATIENT SURGERY
Discharge: HOME | End: 2023-03-03
Attending: STUDENT IN AN ORGANIZED HEALTH CARE EDUCATION/TRAINING PROGRAM | Admitting: STUDENT IN AN ORGANIZED HEALTH CARE EDUCATION/TRAINING PROGRAM
Payer: COMMERCIAL

## 2023-03-03 VITALS
RESPIRATION RATE: 18 BRPM | TEMPERATURE: 97 F | HEART RATE: 55 BPM | HEIGHT: 62 IN | DIASTOLIC BLOOD PRESSURE: 71 MMHG | WEIGHT: 158.81 LBS | SYSTOLIC BLOOD PRESSURE: 150 MMHG | OXYGEN SATURATION: 100 % | BODY MASS INDEX: 29.22 KG/M2

## 2023-03-03 DIAGNOSIS — Z01.818 PRE-OP TESTING: Primary | ICD-10-CM

## 2023-03-03 LAB — B-HCG UR QL: NEGATIVE

## 2023-03-03 PROCEDURE — 45990 SURG DX EXAM ANORECTAL: CPT | Performed by: STUDENT IN AN ORGANIZED HEALTH CARE EDUCATION/TRAINING PROGRAM

## 2023-03-03 PROCEDURE — 46505 CHEMODENERVATION ANAL MUSC: CPT | Performed by: STUDENT IN AN ORGANIZED HEALTH CARE EDUCATION/TRAINING PROGRAM

## 2023-03-03 PROCEDURE — 3E0H7GC INTRODUCTION OF OTHER THERAPEUTIC SUBSTANCE INTO LOWER GI, VIA NATURAL OR ARTIFICIAL OPENING: ICD-10-PCS | Performed by: STUDENT IN AN ORGANIZED HEALTH CARE EDUCATION/TRAINING PROGRAM

## 2023-03-03 RX ORDER — LIDOCAINE HYDROCHLORIDE 10 MG/ML
INJECTION, SOLUTION EPIDURAL; INFILTRATION; INTRACAUDAL; PERINEURAL AS NEEDED
Status: DISCONTINUED | OUTPATIENT
Start: 2023-03-03 | End: 2023-03-03 | Stop reason: SURG

## 2023-03-03 RX ORDER — PROCHLORPERAZINE EDISYLATE 5 MG/ML
5 INJECTION INTRAMUSCULAR; INTRAVENOUS EVERY 8 HOURS PRN
Status: DISCONTINUED | OUTPATIENT
Start: 2023-03-03 | End: 2023-03-03

## 2023-03-03 RX ORDER — CEFOXITIN 2 G/1
INJECTION, POWDER, FOR SOLUTION INTRAVENOUS AS NEEDED
Status: DISCONTINUED | OUTPATIENT
Start: 2023-03-03 | End: 2023-03-03 | Stop reason: SURG

## 2023-03-03 RX ORDER — NICOTINE POLACRILEX 4 MG
30 LOZENGE BUCCAL
Status: DISCONTINUED | OUTPATIENT
Start: 2023-03-03 | End: 2023-03-03

## 2023-03-03 RX ORDER — MIDAZOLAM HYDROCHLORIDE 1 MG/ML
INJECTION INTRAMUSCULAR; INTRAVENOUS AS NEEDED
Status: DISCONTINUED | OUTPATIENT
Start: 2023-03-03 | End: 2023-03-03 | Stop reason: SURG

## 2023-03-03 RX ORDER — NEOSTIGMINE METHYLSULFATE 1 MG/ML
INJECTION, SOLUTION INTRAVENOUS AS NEEDED
Status: DISCONTINUED | OUTPATIENT
Start: 2023-03-03 | End: 2023-03-03 | Stop reason: SURG

## 2023-03-03 RX ORDER — NICOTINE POLACRILEX 4 MG
15 LOZENGE BUCCAL
Status: DISCONTINUED | OUTPATIENT
Start: 2023-03-03 | End: 2023-03-03

## 2023-03-03 RX ORDER — ACETAMINOPHEN 500 MG
1000 TABLET ORAL ONCE
Status: DISCONTINUED | OUTPATIENT
Start: 2023-03-03 | End: 2023-03-03 | Stop reason: HOSPADM

## 2023-03-03 RX ORDER — HYDROMORPHONE HYDROCHLORIDE 1 MG/ML
0.2 INJECTION, SOLUTION INTRAMUSCULAR; INTRAVENOUS; SUBCUTANEOUS EVERY 5 MIN PRN
Status: DISCONTINUED | OUTPATIENT
Start: 2023-03-03 | End: 2023-03-03

## 2023-03-03 RX ORDER — DEXTROSE MONOHYDRATE 25 G/50ML
50 INJECTION, SOLUTION INTRAVENOUS
Status: DISCONTINUED | OUTPATIENT
Start: 2023-03-03 | End: 2023-03-03

## 2023-03-03 RX ORDER — SODIUM CHLORIDE, SODIUM LACTATE, POTASSIUM CHLORIDE, CALCIUM CHLORIDE 600; 310; 30; 20 MG/100ML; MG/100ML; MG/100ML; MG/100ML
INJECTION, SOLUTION INTRAVENOUS CONTINUOUS
Status: DISCONTINUED | OUTPATIENT
Start: 2023-03-03 | End: 2023-03-03

## 2023-03-03 RX ORDER — GLYCOPYRROLATE 0.2 MG/ML
INJECTION, SOLUTION INTRAMUSCULAR; INTRAVENOUS AS NEEDED
Status: DISCONTINUED | OUTPATIENT
Start: 2023-03-03 | End: 2023-03-03 | Stop reason: SURG

## 2023-03-03 RX ORDER — ACETAMINOPHEN 500 MG
1000 TABLET ORAL ONCE AS NEEDED
Status: COMPLETED | OUTPATIENT
Start: 2023-03-03 | End: 2023-03-03

## 2023-03-03 RX ORDER — HYDROCODONE BITARTRATE AND ACETAMINOPHEN 5; 325 MG/1; MG/1
2 TABLET ORAL ONCE AS NEEDED
Status: COMPLETED | OUTPATIENT
Start: 2023-03-03 | End: 2023-03-03

## 2023-03-03 RX ORDER — ONDANSETRON 2 MG/ML
INJECTION INTRAMUSCULAR; INTRAVENOUS AS NEEDED
Status: DISCONTINUED | OUTPATIENT
Start: 2023-03-03 | End: 2023-03-03 | Stop reason: SURG

## 2023-03-03 RX ORDER — TRAMADOL HYDROCHLORIDE 50 MG/1
50 TABLET ORAL EVERY 6 HOURS PRN
Qty: 15 TABLET | Refills: 0 | Status: SHIPPED | OUTPATIENT
Start: 2023-03-03

## 2023-03-03 RX ORDER — HYDROMORPHONE HYDROCHLORIDE 1 MG/ML
0.6 INJECTION, SOLUTION INTRAMUSCULAR; INTRAVENOUS; SUBCUTANEOUS EVERY 5 MIN PRN
Status: DISCONTINUED | OUTPATIENT
Start: 2023-03-03 | End: 2023-03-03

## 2023-03-03 RX ORDER — SCOLOPAMINE TRANSDERMAL SYSTEM 1 MG/1
1 PATCH, EXTENDED RELEASE TRANSDERMAL ONCE
Status: DISCONTINUED | OUTPATIENT
Start: 2023-03-03 | End: 2023-03-03 | Stop reason: HOSPADM

## 2023-03-03 RX ORDER — SODIUM CHLORIDE 0.9 % (FLUSH) 0.9 %
SYRINGE (ML) INJECTION AS NEEDED
Status: DISCONTINUED | OUTPATIENT
Start: 2023-03-03 | End: 2023-03-03 | Stop reason: HOSPADM

## 2023-03-03 RX ORDER — HYDROMORPHONE HYDROCHLORIDE 1 MG/ML
0.4 INJECTION, SOLUTION INTRAMUSCULAR; INTRAVENOUS; SUBCUTANEOUS EVERY 5 MIN PRN
Status: DISCONTINUED | OUTPATIENT
Start: 2023-03-03 | End: 2023-03-03

## 2023-03-03 RX ORDER — ROCURONIUM BROMIDE 10 MG/ML
INJECTION, SOLUTION INTRAVENOUS AS NEEDED
Status: DISCONTINUED | OUTPATIENT
Start: 2023-03-03 | End: 2023-03-03 | Stop reason: SURG

## 2023-03-03 RX ORDER — HYDROCODONE BITARTRATE AND ACETAMINOPHEN 5; 325 MG/1; MG/1
1 TABLET ORAL ONCE AS NEEDED
Status: COMPLETED | OUTPATIENT
Start: 2023-03-03 | End: 2023-03-03

## 2023-03-03 RX ORDER — NALOXONE HYDROCHLORIDE 0.4 MG/ML
80 INJECTION, SOLUTION INTRAMUSCULAR; INTRAVENOUS; SUBCUTANEOUS AS NEEDED
Status: DISCONTINUED | OUTPATIENT
Start: 2023-03-03 | End: 2023-03-03

## 2023-03-03 RX ORDER — ONDANSETRON 2 MG/ML
4 INJECTION INTRAMUSCULAR; INTRAVENOUS EVERY 6 HOURS PRN
Status: DISCONTINUED | OUTPATIENT
Start: 2023-03-03 | End: 2023-03-03

## 2023-03-03 RX ADMIN — ONDANSETRON 4 MG: 2 INJECTION INTRAMUSCULAR; INTRAVENOUS at 12:33:00

## 2023-03-03 RX ADMIN — GLYCOPYRROLATE 0.4 MG: 0.2 INJECTION, SOLUTION INTRAMUSCULAR; INTRAVENOUS at 13:13:00

## 2023-03-03 RX ADMIN — MIDAZOLAM HYDROCHLORIDE 2 MG: 1 INJECTION INTRAMUSCULAR; INTRAVENOUS at 12:28:00

## 2023-03-03 RX ADMIN — LIDOCAINE HYDROCHLORIDE 25 MG: 10 INJECTION, SOLUTION EPIDURAL; INFILTRATION; INTRACAUDAL; PERINEURAL at 12:29:00

## 2023-03-03 RX ADMIN — CEFOXITIN 2 G: 2 INJECTION, POWDER, FOR SOLUTION INTRAVENOUS at 12:44:00

## 2023-03-03 RX ADMIN — NEOSTIGMINE METHYLSULFATE 4 MG: 1 INJECTION, SOLUTION INTRAVENOUS at 13:13:00

## 2023-03-03 RX ADMIN — ROCURONIUM BROMIDE 10 MG: 10 INJECTION, SOLUTION INTRAVENOUS at 12:29:00

## 2023-03-03 NOTE — INTERVAL H&P NOTE
Pre-op Diagnosis: Anal fissure [K60.2]    The above referenced H&P was reviewed by Willard St MD on 3/3/2023, the patient was examined and no significant changes have occurred in the patient's condition since the H&P was performed. I discussed with the patient and/or legal representative the potential benefits, risks and side effects of this procedure; the likelihood of the patient achieving goals; and potential problems that might occur during recuperation. I discussed reasonable alternatives to the procedure, including risks, benefits and side effects related to the alternatives and risks related to not receiving this procedure. We will proceed with procedure as planned.

## 2023-03-03 NOTE — ANESTHESIA PROCEDURE NOTES
Airway  Date/Time: 3/3/2023 12:30 PM  Urgency: elective      General Information and Staff    Patient location during procedure: OR  Anesthesiologist: Prakash Brady MD  Performed: anesthesiologist   Performed by: Prakash Brady MD  Authorized by: Prakash Brady MD      Indications and Patient Condition  Indications for airway management: anesthesia  Sedation level: deep  Preoxygenated: yes  Patient position: sniffing  Mask difficulty assessment: 1 - vent by mask    Final Airway Details  Final airway type: endotracheal airway      Successful airway: ETT  Cuffed: yes   Successful intubation technique: direct laryngoscopy  Facilitating devices/methods: intubating stylet  Endotracheal tube insertion site: oral  Blade: Justin  Blade size: #3  ETT size (mm): 7.0    Cormack-Lehane Classification: grade IIA - partial view of glottis  Placement verified by: chest auscultation and capnometry   Cuff volume (mL): 8  Measured from: lips  Number of attempts at approach: 1

## 2023-03-13 ENCOUNTER — TELEPHONE (OUTPATIENT)
Facility: LOCATION | Age: 53
End: 2023-03-13

## 2023-03-13 NOTE — TELEPHONE ENCOUNTER
pt is requesting a refill on her tramadol. Says she's in 7/10 pain in the fissure area, says it's a stabbing pain. Has sometimes supplemented with ibuprofen, takes metamucil and daily vitamins, is moving- walking around her house. Denies bleeding & fever.  Paged PA s

## 2023-03-30 ENCOUNTER — OFFICE VISIT (OUTPATIENT)
Facility: LOCATION | Age: 53
End: 2023-03-30

## 2023-03-30 VITALS — HEART RATE: 90 BPM | TEMPERATURE: 98 F

## 2023-03-30 DIAGNOSIS — K60.2 ANAL FISSURE: Primary | ICD-10-CM

## 2023-03-30 PROCEDURE — 99024 POSTOP FOLLOW-UP VISIT: CPT | Performed by: STUDENT IN AN ORGANIZED HEALTH CARE EDUCATION/TRAINING PROGRAM

## 2023-03-30 RX ORDER — ANORECTAL OINTMENT 15.7; .44; 24; 20.6 G/100G; G/100G; G/100G; G/100G
71 OINTMENT TOPICAL 2 TIMES DAILY
Qty: 71 G | Refills: 0 | Status: SHIPPED | OUTPATIENT
Start: 2023-03-30

## 2023-04-17 ENCOUNTER — OFFICE VISIT (OUTPATIENT)
Facility: LOCATION | Age: 53
End: 2023-04-17

## 2023-04-17 VITALS — HEART RATE: 68 BPM | TEMPERATURE: 99 F

## 2023-04-17 DIAGNOSIS — K60.2 ANAL FISSURE: Primary | ICD-10-CM

## 2023-04-26 ENCOUNTER — LAB ENCOUNTER (OUTPATIENT)
Dept: LAB | Age: 53
End: 2023-04-26
Attending: NURSE PRACTITIONER
Payer: COMMERCIAL

## 2023-04-26 DIAGNOSIS — R94.4 DECREASED GFR: ICD-10-CM

## 2023-04-26 LAB
ANION GAP SERPL CALC-SCNC: 5 MMOL/L (ref 0–18)
BUN BLD-MCNC: 18 MG/DL (ref 7–18)
CALCIUM BLD-MCNC: 8.7 MG/DL (ref 8.5–10.1)
CHLORIDE SERPL-SCNC: 106 MMOL/L (ref 98–112)
CO2 SERPL-SCNC: 29 MMOL/L (ref 21–32)
CREAT BLD-MCNC: 1.35 MG/DL
FASTING STATUS PATIENT QL REPORTED: NO
GFR SERPLBLD BASED ON 1.73 SQ M-ARVRAT: 47 ML/MIN/1.73M2 (ref 60–?)
GLUCOSE BLD-MCNC: 101 MG/DL (ref 70–99)
OSMOLALITY SERPL CALC.SUM OF ELEC: 292 MOSM/KG (ref 275–295)
POTASSIUM SERPL-SCNC: 3.5 MMOL/L (ref 3.5–5.1)
SODIUM SERPL-SCNC: 140 MMOL/L (ref 136–145)

## 2023-04-26 PROCEDURE — 80048 BASIC METABOLIC PNL TOTAL CA: CPT

## 2023-04-26 PROCEDURE — 36415 COLL VENOUS BLD VENIPUNCTURE: CPT

## 2023-04-27 DIAGNOSIS — E87.6 HYPOKALEMIA: ICD-10-CM

## 2023-04-27 DIAGNOSIS — N18.31 STAGE 3A CHRONIC KIDNEY DISEASE (HCC): Primary | ICD-10-CM

## 2023-05-12 ENCOUNTER — HOSPITAL ENCOUNTER (OUTPATIENT)
Dept: LAB | Age: 53
Discharge: STILL ADMITTED OR ADMINISTRATIVE | End: 2023-05-12
Attending: INTERNAL MEDICINE

## 2023-05-12 ENCOUNTER — TELEPHONE (OUTPATIENT)
Dept: HEMATOLOGY/ONCOLOGY | Age: 53
End: 2023-05-12

## 2023-05-12 ENCOUNTER — OFFICE VISIT (OUTPATIENT)
Dept: HEMATOLOGY/ONCOLOGY | Age: 53
End: 2023-05-12
Attending: INTERNAL MEDICINE

## 2023-05-12 VITALS
TEMPERATURE: 97.7 F | DIASTOLIC BLOOD PRESSURE: 68 MMHG | HEIGHT: 63 IN | SYSTOLIC BLOOD PRESSURE: 144 MMHG | BODY MASS INDEX: 28.4 KG/M2 | HEART RATE: 72 BPM | RESPIRATION RATE: 16 BRPM | OXYGEN SATURATION: 99 % | WEIGHT: 160.3 LBS

## 2023-05-12 DIAGNOSIS — C92.11 BCR/ABL1-POSITIVE CHRONIC MYELOID LEUKEMIA (CML) IN REMISSION (CMD): ICD-10-CM

## 2023-05-12 DIAGNOSIS — C92.11 BCR/ABL1-POSITIVE CHRONIC MYELOID LEUKEMIA (CML) IN REMISSION (CMD): Primary | ICD-10-CM

## 2023-05-12 LAB
ALBUMIN SERPL-MCNC: 4 G/DL (ref 3.6–5.1)
ALBUMIN/GLOB SERPL: 1.6 {RATIO} (ref 1–2.4)
ALP SERPL-CCNC: 54 UNITS/L (ref 45–117)
ALT SERPL-CCNC: 18 UNITS/L
ANION GAP SERPL CALC-SCNC: 7 MMOL/L (ref 7–19)
AST SERPL-CCNC: 20 UNITS/L
BASOPHILS # BLD: 0.1 K/MCL (ref 0–0.3)
BASOPHILS NFR BLD: 1 %
BILIRUB SERPL-MCNC: 0.3 MG/DL (ref 0.2–1)
BUN SERPL-MCNC: 12 MG/DL (ref 6–20)
BUN/CREAT SERPL: 11 (ref 7–25)
CALCIUM SERPL-MCNC: 8.9 MG/DL (ref 8.4–10.2)
CHLORIDE SERPL-SCNC: 112 MMOL/L (ref 97–110)
CO2 SERPL-SCNC: 29 MMOL/L (ref 21–32)
CREAT SERPL-MCNC: 1.13 MG/DL (ref 0.51–0.95)
DEPRECATED RDW RBC: 50.4 FL (ref 39–50)
EOSINOPHIL # BLD: 1.7 K/MCL (ref 0–0.5)
EOSINOPHIL NFR BLD: 23 %
ERYTHROCYTE [DISTWIDTH] IN BLOOD: 13.6 % (ref 11–15)
FASTING DURATION TIME PATIENT: ABNORMAL H
FERRITIN SERPL-MCNC: 85 NG/ML (ref 8–252)
GFR SERPLBLD BASED ON 1.73 SQ M-ARVRAT: 58 ML/MIN
GLOBULIN SER-MCNC: 2.5 G/DL (ref 2–4)
GLUCOSE SERPL-MCNC: 107 MG/DL (ref 70–99)
HCT VFR BLD CALC: 29.9 % (ref 36–46.5)
HGB BLD-MCNC: 9.8 G/DL (ref 12–15.5)
IMM GRANULOCYTES # BLD AUTO: 0 K/MCL (ref 0–0.2)
IMM GRANULOCYTES # BLD: 0 %
IRON SATN MFR SERPL: 24 % (ref 15–45)
IRON SERPL-MCNC: 67 MCG/DL (ref 50–170)
LYMPHOCYTES # BLD: 1.4 K/MCL (ref 1–4)
LYMPHOCYTES NFR BLD: 19 %
MCH RBC QN AUTO: 33.1 PG (ref 26–34)
MCHC RBC AUTO-ENTMCNC: 32.8 G/DL (ref 32–36.5)
MCV RBC AUTO: 101 FL (ref 78–100)
MONOCYTES # BLD: 0.5 K/MCL (ref 0.3–0.9)
MONOCYTES NFR BLD: 7 %
NEUTROPHILS # BLD: 3.5 K/MCL (ref 1.8–7.7)
NEUTROPHILS NFR BLD: 50 %
NRBC BLD MANUAL-RTO: 0 /100 WBC
PLATELET # BLD AUTO: 182 K/MCL (ref 140–450)
POTASSIUM SERPL-SCNC: 3.6 MMOL/L (ref 3.4–5.1)
PROT SERPL-MCNC: 6.5 G/DL (ref 6.4–8.2)
RAINBOW EXTRA TUBES HOLD SPECIMEN: NORMAL
RBC # BLD: 2.96 MIL/MCL (ref 4–5.2)
SODIUM SERPL-SCNC: 144 MMOL/L (ref 135–145)
TIBC SERPL-MCNC: 275 MCG/DL (ref 250–450)
WBC # BLD: 7.1 K/MCL (ref 4.2–11)

## 2023-05-12 PROCEDURE — 3077F SYST BP >= 140 MM HG: CPT | Performed by: INTERNAL MEDICINE

## 2023-05-12 PROCEDURE — 99211 OFF/OP EST MAY X REQ PHY/QHP: CPT

## 2023-05-12 PROCEDURE — 83540 ASSAY OF IRON: CPT | Performed by: INTERNAL MEDICINE

## 2023-05-12 PROCEDURE — 81206 BCR/ABL1 GENE MAJOR BP: CPT | Performed by: INTERNAL MEDICINE

## 2023-05-12 PROCEDURE — 85025 COMPLETE CBC W/AUTO DIFF WBC: CPT | Performed by: INTERNAL MEDICINE

## 2023-05-12 PROCEDURE — 3078F DIAST BP <80 MM HG: CPT | Performed by: INTERNAL MEDICINE

## 2023-05-12 PROCEDURE — 82728 ASSAY OF FERRITIN: CPT | Performed by: INTERNAL MEDICINE

## 2023-05-12 PROCEDURE — 80053 COMPREHEN METABOLIC PANEL: CPT | Performed by: INTERNAL MEDICINE

## 2023-05-12 PROCEDURE — 99215 OFFICE O/P EST HI 40 MIN: CPT | Performed by: INTERNAL MEDICINE

## 2023-05-12 ASSESSMENT — PAIN SCALES - GENERAL: PAINLEVEL: 0

## 2023-05-12 ASSESSMENT — PATIENT HEALTH QUESTIONNAIRE - PHQ9
CLINICAL INTERPRETATION OF PHQ2 SCORE: NO FURTHER SCREENING NEEDED
SUM OF ALL RESPONSES TO PHQ9 QUESTIONS 1 AND 2: 0
1. LITTLE INTEREST OR PLEASURE IN DOING THINGS: NOT AT ALL
2. FEELING DOWN, DEPRESSED OR HOPELESS: NOT AT ALL
2. FEELING DOWN, DEPRESSED OR HOPELESS: NOT AT ALL
SUM OF ALL RESPONSES TO PHQ9 QUESTIONS 1 AND 2: 0
SUM OF ALL RESPONSES TO PHQ9 QUESTIONS 1 AND 2: 0
CLINICAL INTERPRETATION OF PHQ2 SCORE: NO FURTHER SCREENING NEEDED
1. LITTLE INTEREST OR PLEASURE IN DOING THINGS: NOT AT ALL
SUM OF ALL RESPONSES TO PHQ9 QUESTIONS 1 AND 2: 0

## 2023-05-16 ENCOUNTER — TELEPHONE (OUTPATIENT)
Dept: HEMATOLOGY/ONCOLOGY | Age: 53
End: 2023-05-16

## 2023-05-16 LAB
SERVICE CMNT-IMP: NORMAL
T(ABL1,BCR)P210 BLD/T QL: NOT DETECTED

## 2023-05-18 ENCOUNTER — OFFICE VISIT (OUTPATIENT)
Facility: LOCATION | Age: 53
End: 2023-05-18
Payer: COMMERCIAL

## 2023-05-18 DIAGNOSIS — K60.2 ANAL FISSURE: Primary | ICD-10-CM

## 2023-05-18 PROCEDURE — 99213 OFFICE O/P EST LOW 20 MIN: CPT | Performed by: STUDENT IN AN ORGANIZED HEALTH CARE EDUCATION/TRAINING PROGRAM

## 2023-05-26 ENCOUNTER — HOSPITAL ENCOUNTER (OUTPATIENT)
Dept: NUCLEAR MEDICINE | Facility: HOSPITAL | Age: 53
Discharge: HOME | End: 2023-05-26
Attending: INTERNAL MEDICINE
Payer: COMMERCIAL

## 2023-05-26 DIAGNOSIS — C92.11 BCR/ABL1-POSITIVE CHRONIC MYELOID LEUKEMIA (CML) IN REMISSION (HCC): ICD-10-CM

## 2023-05-26 LAB — GLUCOSE BLD-MCNC: 83 MG/DL (ref 70–99)

## 2023-05-26 PROCEDURE — 82962 GLUCOSE BLOOD TEST: CPT

## 2023-05-26 PROCEDURE — 78815 PET IMAGE W/CT SKULL-THIGH: CPT | Performed by: INTERNAL MEDICINE

## 2023-05-30 ENCOUNTER — TELEPHONE (OUTPATIENT)
Dept: HEMATOLOGY/ONCOLOGY | Age: 53
End: 2023-05-30

## 2023-05-30 DIAGNOSIS — E04.1 THYROID NODULE: Primary | ICD-10-CM

## 2023-06-02 DIAGNOSIS — C92.11 BCR/ABL1-POSITIVE CHRONIC MYELOID LEUKEMIA (CML) IN REMISSION (CMD): Primary | ICD-10-CM

## 2023-06-12 ENCOUNTER — EXTERNAL RECORD (OUTPATIENT)
Dept: HEALTH INFORMATION MANAGEMENT | Facility: OTHER | Age: 53
End: 2023-06-12

## 2023-06-16 ENCOUNTER — HOSPITAL ENCOUNTER (OUTPATIENT)
Dept: ULTRASOUND IMAGING | Facility: HOSPITAL | Age: 53
Discharge: HOME | End: 2023-06-16
Attending: INTERNAL MEDICINE
Payer: COMMERCIAL

## 2023-06-16 VITALS
OXYGEN SATURATION: 99 % | SYSTOLIC BLOOD PRESSURE: 121 MMHG | RESPIRATION RATE: 13 BRPM | DIASTOLIC BLOOD PRESSURE: 83 MMHG | HEART RATE: 66 BPM

## 2023-06-16 DIAGNOSIS — E04.1 NODULAR THYROID DISEASE: ICD-10-CM

## 2023-06-16 PROCEDURE — 76536 US EXAM OF HEAD AND NECK: CPT | Performed by: INTERNAL MEDICINE

## 2023-06-16 PROCEDURE — 88173 CYTOPATH EVAL FNA REPORT: CPT | Performed by: RADIOLOGY

## 2023-06-16 PROCEDURE — 10005 FNA BX W/US GDN 1ST LES: CPT | Performed by: INTERNAL MEDICINE

## 2023-06-16 NOTE — IMAGING NOTE
Was called to bedside to evaluate patient post FNA thyroid who felt faint. Pt lying on gurney, pale skin, warm and dry. Reports immediately after sitting up gradually she felt faint with funny auditory sensations and tingling in fingers. Pt removed her mask and pt vitals taken. Dr Shubham Camara, performing radiologist came to bedside to evaluate. Gradually sat patient up and provided orange juice. Tolerated well w/o nausea. VSS and pt was released with her .

## 2023-06-20 ENCOUNTER — CLINICAL ABSTRACT (OUTPATIENT)
Dept: HEALTH INFORMATION MANAGEMENT | Facility: OTHER | Age: 53
End: 2023-06-20

## 2023-06-21 ENCOUNTER — TELEPHONE (OUTPATIENT)
Dept: FAMILY MEDICINE CLINIC | Facility: CLINIC | Age: 53
End: 2023-06-21

## 2023-06-23 ENCOUNTER — LAB ENCOUNTER (OUTPATIENT)
Dept: LAB | Age: 53
End: 2023-06-23
Attending: INTERNAL MEDICINE
Payer: COMMERCIAL

## 2023-06-23 DIAGNOSIS — E04.1 NODULAR THYROID DISEASE: ICD-10-CM

## 2023-06-23 DIAGNOSIS — R00.2 PALPITATIONS: Primary | ICD-10-CM

## 2023-06-23 LAB
T4 FREE SERPL-MCNC: 1 NG/DL (ref 0.8–1.7)
TSI SER-ACNC: 1.38 MIU/ML (ref 0.36–3.74)

## 2023-06-23 PROCEDURE — 84443 ASSAY THYROID STIM HORMONE: CPT

## 2023-06-23 PROCEDURE — 36415 COLL VENOUS BLD VENIPUNCTURE: CPT

## 2023-06-23 PROCEDURE — 84439 ASSAY OF FREE THYROXINE: CPT

## 2023-07-18 ENCOUNTER — EXTERNAL RECORD (OUTPATIENT)
Dept: HEALTH INFORMATION MANAGEMENT | Facility: OTHER | Age: 53
End: 2023-07-18

## 2023-07-21 ENCOUNTER — CLINICAL ABSTRACT (OUTPATIENT)
Dept: HEALTH INFORMATION MANAGEMENT | Facility: OTHER | Age: 53
End: 2023-07-21

## 2023-07-25 ENCOUNTER — TELEPHONE (OUTPATIENT)
Dept: FAMILY MEDICINE CLINIC | Facility: CLINIC | Age: 53
End: 2023-07-25

## 2023-07-25 DIAGNOSIS — Z01.818 PRE-OP TESTING: Primary | ICD-10-CM

## 2023-07-25 NOTE — TELEPHONE ENCOUNTER
Received pre op paperwork for patient from [facility] for surgery with Dr. Shahab Isabel on 8/23/23 for Left Thyroid Lobectomy, possible total thyroidectomy. Orders needed  H&P  Any pre operative lab work should be ordered per your patients history along with surgery site requirements  BMP  EKG    Paperwork in triage for review.     Appt 8/10/23 with Dr. Richard Soto at 10 am.

## 2023-07-31 ENCOUNTER — E-ADVICE (OUTPATIENT)
Dept: HEMATOLOGY/ONCOLOGY | Age: 53
End: 2023-07-31

## 2023-08-04 ENCOUNTER — LAB ENCOUNTER (OUTPATIENT)
Dept: LAB | Age: 53
End: 2023-08-04
Attending: STUDENT IN AN ORGANIZED HEALTH CARE EDUCATION/TRAINING PROGRAM
Payer: COMMERCIAL

## 2023-08-04 ENCOUNTER — TELEPHONE (OUTPATIENT)
Dept: FAMILY MEDICINE CLINIC | Facility: CLINIC | Age: 53
End: 2023-08-04

## 2023-08-04 DIAGNOSIS — Z01.818 PRE-OP TESTING: ICD-10-CM

## 2023-08-04 LAB
ALBUMIN SERPL-MCNC: 3.9 G/DL (ref 3.4–5)
ALBUMIN/GLOB SERPL: 1.6 {RATIO} (ref 1–2)
ALP LIVER SERPL-CCNC: 55 U/L
ALT SERPL-CCNC: 21 U/L
ANION GAP SERPL CALC-SCNC: 3 MMOL/L (ref 0–18)
ANION GAP SERPL CALC-SCNC: 7 MMOL/L (ref 0–18)
AST SERPL-CCNC: 27 U/L (ref 15–37)
BILIRUB SERPL-MCNC: 0.3 MG/DL (ref 0.1–2)
BUN BLD-MCNC: 14 MG/DL (ref 7–18)
BUN BLD-MCNC: 15 MG/DL (ref 7–18)
CALCIUM BLD-MCNC: 8.5 MG/DL (ref 8.5–10.1)
CALCIUM BLD-MCNC: 8.6 MG/DL (ref 8.5–10.1)
CHLORIDE SERPL-SCNC: 108 MMOL/L (ref 98–112)
CHLORIDE SERPL-SCNC: 108 MMOL/L (ref 98–112)
CO2 SERPL-SCNC: 25 MMOL/L (ref 21–32)
CO2 SERPL-SCNC: 29 MMOL/L (ref 21–32)
CREAT BLD-MCNC: 1.27 MG/DL
CREAT BLD-MCNC: 1.33 MG/DL
EGFRCR SERPLBLD CKD-EPI 2021: 47 ML/MIN/1.73M2 (ref 60–?)
EGFRCR SERPLBLD CKD-EPI 2021: 50 ML/MIN/1.73M2 (ref 60–?)
FASTING STATUS PATIENT QL REPORTED: YES
GLOBULIN PLAS-MCNC: 2.5 G/DL (ref 2.8–4.4)
GLUCOSE BLD-MCNC: 105 MG/DL (ref 70–99)
GLUCOSE BLD-MCNC: 107 MG/DL (ref 70–99)
OSMOLALITY SERPL CALC.SUM OF ELEC: 291 MOSM/KG (ref 275–295)
OSMOLALITY SERPL CALC.SUM OF ELEC: 291 MOSM/KG (ref 275–295)
POTASSIUM SERPL-SCNC: 3.5 MMOL/L (ref 3.5–5.1)
POTASSIUM SERPL-SCNC: 3.5 MMOL/L (ref 3.5–5.1)
PROT SERPL-MCNC: 6.4 G/DL (ref 6.4–8.2)
SODIUM SERPL-SCNC: 140 MMOL/L (ref 136–145)
SODIUM SERPL-SCNC: 140 MMOL/L (ref 136–145)

## 2023-08-04 PROCEDURE — 36415 COLL VENOUS BLD VENIPUNCTURE: CPT

## 2023-08-04 PROCEDURE — 80053 COMPREHEN METABOLIC PANEL: CPT

## 2023-08-04 PROCEDURE — 80048 BASIC METABOLIC PNL TOTAL CA: CPT

## 2023-08-04 NOTE — TELEPHONE ENCOUNTER
[facility] gave cardiac clearance August 3/2023 for upcoming procedure and she may hold the aspirin for 2 weeks.   EKG is available as well

## 2023-08-05 ENCOUNTER — LABORATORY ENCOUNTER (OUTPATIENT)
Dept: LAB | Age: 53
End: 2023-08-05
Attending: OTOLARYNGOLOGY
Payer: COMMERCIAL

## 2023-08-05 DIAGNOSIS — Z01.818 PRE-OP TESTING: ICD-10-CM

## 2023-08-05 LAB
BASOPHILS # BLD AUTO: 0.06 X10(3) UL (ref 0–0.2)
BASOPHILS NFR BLD AUTO: 0.8 %
EOSINOPHIL # BLD AUTO: 0.69 X10(3) UL (ref 0–0.7)
EOSINOPHIL NFR BLD AUTO: 8.9 %
ERYTHROCYTE [DISTWIDTH] IN BLOOD BY AUTOMATED COUNT: 14 %
HCT VFR BLD AUTO: 34 %
HGB BLD-MCNC: 11.3 G/DL
IMM GRANULOCYTES # BLD AUTO: 0.02 X10(3) UL (ref 0–1)
IMM GRANULOCYTES NFR BLD: 0.3 %
LYMPHOCYTES # BLD AUTO: 1.51 X10(3) UL (ref 1–4)
LYMPHOCYTES NFR BLD AUTO: 19.5 %
MCH RBC QN AUTO: 33.2 PG (ref 26–34)
MCHC RBC AUTO-ENTMCNC: 33.2 G/DL (ref 31–37)
MCV RBC AUTO: 100 FL
MONOCYTES # BLD AUTO: 0.46 X10(3) UL (ref 0.1–1)
MONOCYTES NFR BLD AUTO: 5.9 %
NEUTROPHILS # BLD AUTO: 5 X10 (3) UL (ref 1.5–7.7)
NEUTROPHILS # BLD AUTO: 5 X10(3) UL (ref 1.5–7.7)
NEUTROPHILS NFR BLD AUTO: 64.6 %
PLATELET # BLD AUTO: 200 10(3)UL (ref 150–450)
RBC # BLD AUTO: 3.4 X10(6)UL
WBC # BLD AUTO: 7.7 X10(3) UL (ref 4–11)

## 2023-08-05 PROCEDURE — 36415 COLL VENOUS BLD VENIPUNCTURE: CPT

## 2023-08-05 PROCEDURE — 85025 COMPLETE CBC W/AUTO DIFF WBC: CPT

## 2023-08-09 ENCOUNTER — ORDER TRANSCRIPTION (OUTPATIENT)
Dept: ADMINISTRATIVE | Facility: HOSPITAL | Age: 53
End: 2023-08-09

## 2023-08-09 DIAGNOSIS — R22.1 LOCALIZED SWELLING, MASS AND LUMP, NECK: Primary | ICD-10-CM

## 2023-08-10 ENCOUNTER — OFFICE VISIT (OUTPATIENT)
Dept: FAMILY MEDICINE CLINIC | Facility: CLINIC | Age: 53
End: 2023-08-10
Payer: COMMERCIAL

## 2023-08-10 VITALS
OXYGEN SATURATION: 99 % | SYSTOLIC BLOOD PRESSURE: 120 MMHG | HEIGHT: 62 IN | HEART RATE: 81 BPM | TEMPERATURE: 98 F | BODY MASS INDEX: 30.36 KG/M2 | WEIGHT: 165 LBS | DIASTOLIC BLOOD PRESSURE: 70 MMHG

## 2023-08-10 DIAGNOSIS — Z01.818 PREOP EXAMINATION: Primary | ICD-10-CM

## 2023-08-10 PROCEDURE — 3074F SYST BP LT 130 MM HG: CPT | Performed by: STUDENT IN AN ORGANIZED HEALTH CARE EDUCATION/TRAINING PROGRAM

## 2023-08-10 PROCEDURE — 3008F BODY MASS INDEX DOCD: CPT | Performed by: STUDENT IN AN ORGANIZED HEALTH CARE EDUCATION/TRAINING PROGRAM

## 2023-08-10 PROCEDURE — 3078F DIAST BP <80 MM HG: CPT | Performed by: STUDENT IN AN ORGANIZED HEALTH CARE EDUCATION/TRAINING PROGRAM

## 2023-08-10 PROCEDURE — 99244 OFF/OP CNSLTJ NEW/EST MOD 40: CPT | Performed by: STUDENT IN AN ORGANIZED HEALTH CARE EDUCATION/TRAINING PROGRAM

## 2023-08-10 NOTE — PROGRESS NOTES
Edmundo Bloom is a 52year old female who presents for a pre-operative physical exam. Patient is to have Thyroid removal, to be done by Dr. Shahab Isabel at [facility] on 8/23/23. Pt has had previous anesthesia:  Yes. Previous complications:  No  Hx of DVT/PE:  No    HPI:   Patient's history includes: HTN, Paroxysmal Afib on metoprolol, not on anticoagulation, anal fissure, Anemia, Chronic myeloid leukemia     1) High Risk Cardiac Conditions              1) Recent MI - no               2) Decompensated Heart Failure - no              3) Unstable angina - no              4) Symptomatic arrythmia - afib, Follows with [address] and was advised to continue current plan of care. She did see them for preop prior to this surgery. 5) Sx Valvular Disease - no     2) Intermediate Risk Factors -  None     2) Functional Status - > 4 mets (Walk, run, climb stairs) yes. 3) Surgery Specific Risk -  Intermediate (neck)     4) Further Noninvasive evaluation    1) EKG - did not order as she is also seeing cardiology for optimization    5) Need for medical therapy - Beta Blocker, Statins indicated ? no    Current Outpatient Medications   Medication Sig Dispense Refill    lisinopril 40 MG Oral Tab Take 1 tablet (40 mg total) by mouth daily. 90 tablet 1    Lidocaine HCl 1 % External Gel Apply topically. Nifedipine 0.3% - lidocaine 1.5% Ointment - Use externally 2-3 times daily as needed. docusate sodium 100 MG Oral Cap Take 1 capsule (100 mg total) by mouth daily. acidophilus-pectin Oral Cap Take 1 capsule by mouth daily. W/b12      Metoprolol Succinate ER 25 MG Oral Tablet 24 Hr Take 1 tablet (25 mg total) by mouth daily. (Patient taking differently: Take 1 tablet (25 mg total) by mouth daily with lunch.) 90 tablet 0    Aspirin 81 MG Oral Cap Take 81 mg by mouth daily with lunch.  1 tablet 0    Imatinib Mesylate 100 MG Oral Tab Take 3 tablets (300 mg total) by mouth in the morning and 3 tablets (300 mg total) before bedtime. Multiple Vitamins-Minerals (CENTRUM OR) Take 1 capsule by mouth daily.         Allergies:   Magnesium               OTHER (SEE COMMENTS)    Comment:Blood pressure drops with IV magnesium during             blood transfusions   Past Medical History:   Diagnosis Date    Anemia     Arrhythmia     A-FIB    Breast CA ([facility] [address]     denies- never diagnosed with breast cancer    Calculus of gallbladder without mention of cholecystitis or obstruction 06/29/2012    Calculus of kidney     Chronic myeloid leukemia, without mention of having achieved remission 06/29/2012    diagnosed 2003    CML (chronic myelocytic leukemia) ([facility] [place] [address]     Dysfunctional uterine bleeding     Dysmenorrhea     Essential hypertension, benign     Gestational diabetes mellitus     H/O mammogram 04/12/2012    BENIGN FINDINGS    High blood pressure     History of blood transfusion 2003    History of recent hospitalization     X2 days at [facility] - had abd pain, N/V low potassium and inflammation in Kidneys    Hx of motion sickness     Pap smear for cervical cancer screening 03/30/2012    wnl neg hpv    Peptic ulcer     Personal history of antineoplastic chemotherapy     Ewing teeth extracted       Past Surgical History:   Procedure Laterality Date    D & C  10/26/12    ENDOMETRIAL ABLATION  10/26/2012    d&c followed by novasure ablation    HYSTEROSCOPY      MIRENA, IUD  9/25/2012    REMOVAL GALLBLADDER  2005      Family History   Problem Relation Age of Onset    Other (abdominal cancer) Father         pancreatic bladder & liver    Other (adrenal cancer) Mother     Cancer Maternal Grandfather     Heart Disease Paternal Grandmother     Stroke Paternal Grandfather       Social History:   Social History     Socioeconomic History    Marital status:    Occupational History    Occupation: Homemaker    Tobacco Use    Smoking status: Never    Smokeless tobacco: Never   Vaping Use    Vaping Use: Never used   Substance and Sexual Activity    Alcohol use: No     Alcohol/week: 0.0 standard drinks of alcohol    Drug use: No    Sexual activity: Not Currently   Other Topics Concern    Caffeine Concern Yes     Comment: 1 cans of soda daily    Exercise Yes     Comment: walks every day, plays sports    Seat Belt Yes         REVIEW OF SYSTEMS:   GENERAL: feels well otherwise  LUNGS: denies shortness of breath  CARDIOVASCULAR: denies chest pain  GI: denies abdominal pain  : denies dysuria  NEURO: denies headaches    EXAM:   LMP  (LMP Unknown)   GENERAL: well developed, well nourished,in no apparent distress  HEENT: atraumatic, normocephalic, O/P clear  NECK: supple,no adenopathy  LUNGS: clear to auscultation  CARDIO: RRR without murmur  GI: good BS's,no masses, HSM or tenderness  EXTREMITIES: no edema  NEURO: Alert    ASSESSMENT AND PLAN:   Elyssa Elam is a 52year old female who presents for a pre-operative physical exam.     1. Preop examination  I have independently evaluated patient. Patient is a 45yoF  who is low-intermediate risk for a low-intermediate risk surgery. There are not modifiable risk factors (smoking, etc). Noted bump in Cr from previous labs on preop labwork. Cr 1.33 from 1.18 last month. Encouraged hydration and will order recheck. Once that results, if stable or improved would consider pt  optimized from a medical standpoint. Cardiology optimization completed per cardiology. I spent a total of 30 minutes in both face-to-face and non-face-to-face activities for this visit on the date of this encounter.

## 2023-08-10 NOTE — H&P (VIEW-ONLY)
Mg Wheeler is a 52year old female who presents for a pre-operative physical exam. Patient is to have Thyroid removal, to be done by Dr. Ashley Cardozo at [facility] on 8/23/23. Pt has had previous anesthesia:  Yes. Previous complications:  No  Hx of DVT/PE:  No    HPI:   Patient's history includes: HTN, Paroxysmal Afib on metoprolol, not on anticoagulation, anal fissure, Anemia, Chronic myeloid leukemia     1) High Risk Cardiac Conditions              1) Recent MI - no               2) Decompensated Heart Failure - no              3) Unstable angina - no              4) Symptomatic arrythmia - afib, Follows with [address] and was advised to continue current plan of care. She did see them for preop prior to this surgery. 5) Sx Valvular Disease - no     2) Intermediate Risk Factors -  None     2) Functional Status - > 4 mets (Walk, run, climb stairs) yes. 3) Surgery Specific Risk -  Intermediate (neck)     4) Further Noninvasive evaluation    1) EKG - did not order as she is also seeing cardiology for optimization    5) Need for medical therapy - Beta Blocker, Statins indicated ? no    Current Outpatient Medications   Medication Sig Dispense Refill    lisinopril 40 MG Oral Tab Take 1 tablet (40 mg total) by mouth daily. 90 tablet 1    Lidocaine HCl 1 % External Gel Apply topically. Nifedipine 0.3% - lidocaine 1.5% Ointment - Use externally 2-3 times daily as needed. docusate sodium 100 MG Oral Cap Take 1 capsule (100 mg total) by mouth daily. acidophilus-pectin Oral Cap Take 1 capsule by mouth daily. W/b12      Metoprolol Succinate ER 25 MG Oral Tablet 24 Hr Take 1 tablet (25 mg total) by mouth daily. (Patient taking differently: Take 1 tablet (25 mg total) by mouth daily with lunch.) 90 tablet 0    Aspirin 81 MG Oral Cap Take 81 mg by mouth daily with lunch.  1 tablet 0    Imatinib Mesylate 100 MG Oral Tab Take 3 tablets (300 mg total) by mouth in the morning and 3 tablets (300 mg total) before bedtime. Multiple Vitamins-Minerals (CENTRUM OR) Take 1 capsule by mouth daily.         Allergies:   Magnesium               OTHER (SEE COMMENTS)    Comment:Blood pressure drops with IV magnesium during             blood transfusions   Past Medical History:   Diagnosis Date    Anemia     Arrhythmia     A-FIB    Breast CA ([facility] [address]     denies- never diagnosed with breast cancer    Calculus of gallbladder without mention of cholecystitis or obstruction 06/29/2012    Calculus of kidney     Chronic myeloid leukemia, without mention of having achieved remission 06/29/2012    diagnosed 2003    CML (chronic myelocytic leukemia) ([facility] [address]     Dysfunctional uterine bleeding     Dysmenorrhea     Essential hypertension, benign     Gestational diabetes mellitus     H/O mammogram 04/12/2012    BENIGN FINDINGS    High blood pressure     History of blood transfusion 2003    History of recent hospitalization     X2 days at [facility] - had abd pain, N/V low potassium and inflammation in Kidneys    Hx of motion sickness     Pap smear for cervical cancer screening 03/30/2012    wnl neg hpv    Peptic ulcer     Personal history of antineoplastic chemotherapy     [place] teeth extracted       Past Surgical History:   Procedure Laterality Date    D & C  10/26/12    ENDOMETRIAL ABLATION  10/26/2012    d&c followed by novasure ablation    HYSTEROSCOPY      MIRENA, IUD  9/25/2012    REMOVAL GALLBLADDER  2005      Family History   Problem Relation Age of Onset    Other (abdominal cancer) Father         pancreatic bladder & liver    Other (adrenal cancer) Mother     Cancer Maternal Grandfather     Heart Disease Paternal Grandmother     Stroke Paternal Grandfather       Social History:   Social History     Socioeconomic History    Marital status:    Occupational History    Occupation: Homemaker    Tobacco Use    Smoking status: Never    Smokeless tobacco: Never   Vaping Use    Vaping Use: Never used   Substance and Sexual Activity    Alcohol use: No     Alcohol/week: 0.0 standard drinks of alcohol    Drug use: No    Sexual activity: Not Currently   Other Topics Concern    Caffeine Concern Yes     Comment: 1 cans of soda daily    Exercise Yes     Comment: walks every day, plays sports    Seat Belt Yes         REVIEW OF SYSTEMS:   GENERAL: feels well otherwise  LUNGS: denies shortness of breath  CARDIOVASCULAR: denies chest pain  GI: denies abdominal pain  : denies dysuria  NEURO: denies headaches    EXAM:   LMP  (LMP Unknown)   GENERAL: well developed, well nourished,in no apparent distress  HEENT: atraumatic, normocephalic, O/P clear  NECK: supple,no adenopathy  LUNGS: clear to auscultation  CARDIO: RRR without murmur  GI: good BS's,no masses, HSM or tenderness  EXTREMITIES: no edema  NEURO: Alert    ASSESSMENT AND PLAN:   Anil Michel is a 52year old female who presents for a pre-operative physical exam.     1. Preop examination  I have independently evaluated patient. Patient is a 45yoF  who is low-intermediate risk for a low-intermediate risk surgery. There are not modifiable risk factors (smoking, etc). Noted bump in Cr from previous labs on preop labwork. Cr 1.33 from 1.18 last month. Encouraged hydration and will order recheck. Once that results, if stable or improved would consider pt  optimized from a medical standpoint. Cardiology optimization completed per cardiology. I spent a total of 30 minutes in both face-to-face and non-face-to-face activities for this visit on the date of this encounter.

## 2023-08-12 ENCOUNTER — LAB ENCOUNTER (OUTPATIENT)
Dept: LAB | Age: 53
End: 2023-08-12
Attending: STUDENT IN AN ORGANIZED HEALTH CARE EDUCATION/TRAINING PROGRAM
Payer: COMMERCIAL

## 2023-08-12 DIAGNOSIS — Z01.818 PREOP EXAMINATION: ICD-10-CM

## 2023-08-12 LAB
ANION GAP SERPL CALC-SCNC: 6 MMOL/L (ref 0–18)
BUN BLD-MCNC: 16 MG/DL (ref 7–18)
CALCIUM BLD-MCNC: 8.3 MG/DL (ref 8.5–10.1)
CHLORIDE SERPL-SCNC: 110 MMOL/L (ref 98–112)
CO2 SERPL-SCNC: 25 MMOL/L (ref 21–32)
CREAT BLD-MCNC: 1.5 MG/DL
EGFRCR SERPLBLD CKD-EPI 2021: 41 ML/MIN/1.73M2 (ref 60–?)
FASTING STATUS PATIENT QL REPORTED: NO
GLUCOSE BLD-MCNC: 169 MG/DL (ref 70–99)
OSMOLALITY SERPL CALC.SUM OF ELEC: 297 MOSM/KG (ref 275–295)
POTASSIUM SERPL-SCNC: 3.3 MMOL/L (ref 3.5–5.1)
SODIUM SERPL-SCNC: 141 MMOL/L (ref 136–145)

## 2023-08-12 PROCEDURE — 36415 COLL VENOUS BLD VENIPUNCTURE: CPT

## 2023-08-12 PROCEDURE — 80048 BASIC METABOLIC PNL TOTAL CA: CPT

## 2023-08-14 ENCOUNTER — TELEPHONE (OUTPATIENT)
Dept: FAMILY MEDICINE CLINIC | Facility: CLINIC | Age: 53
End: 2023-08-14

## 2023-08-14 NOTE — TELEPHONE ENCOUNTER
[facility] neph[facility] is unable to see pt until 8/30 at the earliest.    Routed to Dr. Prateek Belcher - would you like pt to try to get in with another practice prior to surgery?

## 2023-08-14 NOTE — TELEPHONE ENCOUNTER
----- Message from Denisha Walter MD sent at 8/14/2023  3:26 PM CDT -----  Triage, I am placing a referral to nephrology. Ideally patient could see them before her upcoming surgery 8/23. Can you help with scheduling?

## 2023-08-18 ENCOUNTER — PATIENT MESSAGE (OUTPATIENT)
Dept: FAMILY MEDICINE CLINIC | Facility: CLINIC | Age: 53
End: 2023-08-18

## 2023-08-18 NOTE — TELEPHONE ENCOUNTER
From: Abdiaziz Biggs  To: Rudy Garces MD  Sent: 8/18/2023 2:19 PM CDT  Subject: regarding kidneys     Hi Dr. Maribell Espitia,    Just wanted to update you, I was able to see the nephrology Doctor today , that you referred me to. Lamond Skiff thru [facility] [address] We went over everything and she sent me for more blood work, urine testing and kidney ultrasound. Just wanted to make you aware. So i will be seeing her as a nephrology doctor. I know you put in for me to see a doctor thru [facility], so could you please go into my chart and cancel that appt. I have on Aug. 30. I will be seeing Dr. Mae Soares after my surgery , I already set up an appt. with her. Thanks again for all your help and information.  Hopefully i will still be having this thyroid surgery on the 23rd of August.   Michael Mckeon

## 2023-08-22 ENCOUNTER — E-ADVICE (OUTPATIENT)
Dept: HEMATOLOGY/ONCOLOGY | Age: 53
End: 2023-08-22

## 2023-08-23 ENCOUNTER — HOSPITAL ENCOUNTER (OUTPATIENT)
Facility: HOSPITAL | Age: 53
Discharge: HOME | End: 2023-08-24
Attending: OTOLARYNGOLOGY | Admitting: OTOLARYNGOLOGY
Payer: COMMERCIAL

## 2023-08-23 ENCOUNTER — ANESTHESIA (OUTPATIENT)
Dept: SURGERY | Facility: HOSPITAL | Age: 53
End: 2023-08-23
Payer: COMMERCIAL

## 2023-08-23 ENCOUNTER — ANESTHESIA EVENT (OUTPATIENT)
Dept: SURGERY | Facility: HOSPITAL | Age: 53
End: 2023-08-23
Payer: COMMERCIAL

## 2023-08-23 DIAGNOSIS — Z01.818 PRE-OP TESTING: Primary | ICD-10-CM

## 2023-08-23 LAB — B-HCG UR QL: NEGATIVE

## 2023-08-23 PROCEDURE — 88333 PATH CONSLTJ SURG CYTO XM 1: CPT | Performed by: OTOLARYNGOLOGY

## 2023-08-23 PROCEDURE — 81025 URINE PREGNANCY TEST: CPT

## 2023-08-23 PROCEDURE — 88342 IMHCHEM/IMCYTCHM 1ST ANTB: CPT | Performed by: OTOLARYNGOLOGY

## 2023-08-23 PROCEDURE — 88307 TISSUE EXAM BY PATHOLOGIST: CPT | Performed by: OTOLARYNGOLOGY

## 2023-08-23 PROCEDURE — 88321 CONSLTJ&REPRT SLD PREP ELSWR: CPT | Performed by: OTOLARYNGOLOGY

## 2023-08-23 PROCEDURE — 88341 IMHCHEM/IMCYTCHM EA ADD ANTB: CPT | Performed by: OTOLARYNGOLOGY

## 2023-08-23 PROCEDURE — 0GTG0ZZ RESECTION OF LEFT THYROID GLAND LOBE, OPEN APPROACH: ICD-10-PCS | Performed by: OTOLARYNGOLOGY

## 2023-08-23 PROCEDURE — 88331 PATH CONSLTJ SURG 1 BLK 1SPC: CPT | Performed by: OTOLARYNGOLOGY

## 2023-08-23 RX ORDER — HYDROCODONE BITARTRATE AND ACETAMINOPHEN 5; 325 MG/1; MG/1
1 TABLET ORAL ONCE AS NEEDED
Status: DISCONTINUED | OUTPATIENT
Start: 2023-08-23 | End: 2023-08-23 | Stop reason: HOSPADM

## 2023-08-23 RX ORDER — LIDOCAINE HYDROCHLORIDE 10 MG/ML
INJECTION, SOLUTION EPIDURAL; INFILTRATION; INTRACAUDAL; PERINEURAL AS NEEDED
Status: DISCONTINUED | OUTPATIENT
Start: 2023-08-23 | End: 2023-08-23 | Stop reason: SURG

## 2023-08-23 RX ORDER — LABETALOL HYDROCHLORIDE 5 MG/ML
5 INJECTION, SOLUTION INTRAVENOUS EVERY 5 MIN PRN
Status: DISCONTINUED | OUTPATIENT
Start: 2023-08-23 | End: 2023-08-23 | Stop reason: HOSPADM

## 2023-08-23 RX ORDER — LISINOPRIL 20 MG/1
40 TABLET ORAL DAILY
Status: DISCONTINUED | OUTPATIENT
Start: 2023-08-23 | End: 2023-08-24

## 2023-08-23 RX ORDER — ACETAMINOPHEN 500 MG
1000 TABLET ORAL ONCE
Status: DISCONTINUED | OUTPATIENT
Start: 2023-08-23 | End: 2023-08-23 | Stop reason: HOSPADM

## 2023-08-23 RX ORDER — IMATINIB MESYLATE 100 MG/1
300 TABLET, FILM COATED ORAL 2 TIMES DAILY
Status: DISCONTINUED | OUTPATIENT
Start: 2023-08-23 | End: 2023-08-24

## 2023-08-23 RX ORDER — ONDANSETRON 2 MG/ML
4 INJECTION INTRAMUSCULAR; INTRAVENOUS EVERY 6 HOURS PRN
Status: DISCONTINUED | OUTPATIENT
Start: 2023-08-23 | End: 2023-08-23 | Stop reason: HOSPADM

## 2023-08-23 RX ORDER — SODIUM CHLORIDE, SODIUM LACTATE, POTASSIUM CHLORIDE, CALCIUM CHLORIDE 600; 310; 30; 20 MG/100ML; MG/100ML; MG/100ML; MG/100ML
INJECTION, SOLUTION INTRAVENOUS CONTINUOUS
Status: DISCONTINUED | OUTPATIENT
Start: 2023-08-23 | End: 2023-08-23 | Stop reason: HOSPADM

## 2023-08-23 RX ORDER — NICOTINE POLACRILEX 4 MG
15 LOZENGE BUCCAL
Status: DISCONTINUED | OUTPATIENT
Start: 2023-08-23 | End: 2023-08-23 | Stop reason: HOSPADM

## 2023-08-23 RX ORDER — HYDROCODONE BITARTRATE AND ACETAMINOPHEN 5; 325 MG/1; MG/1
2 TABLET ORAL ONCE AS NEEDED
Status: DISCONTINUED | OUTPATIENT
Start: 2023-08-23 | End: 2023-08-23 | Stop reason: HOSPADM

## 2023-08-23 RX ORDER — DEXTROSE, SODIUM CHLORIDE, SODIUM LACTATE, POTASSIUM CHLORIDE, AND CALCIUM CHLORIDE 5; .6; .31; .03; .02 G/100ML; G/100ML; G/100ML; G/100ML; G/100ML
INJECTION, SOLUTION INTRAVENOUS CONTINUOUS
Status: DISCONTINUED | OUTPATIENT
Start: 2023-08-23 | End: 2023-08-24

## 2023-08-23 RX ORDER — EPHEDRINE SULFATE 50 MG/ML
INJECTION INTRAVENOUS AS NEEDED
Status: DISCONTINUED | OUTPATIENT
Start: 2023-08-23 | End: 2023-08-23 | Stop reason: SURG

## 2023-08-23 RX ORDER — ROCURONIUM BROMIDE 10 MG/ML
INJECTION, SOLUTION INTRAVENOUS AS NEEDED
Status: DISCONTINUED | OUTPATIENT
Start: 2023-08-23 | End: 2023-08-23 | Stop reason: SURG

## 2023-08-23 RX ORDER — ACETAMINOPHEN 500 MG
500 TABLET ORAL EVERY 6 HOURS PRN
Status: DISCONTINUED | OUTPATIENT
Start: 2023-08-23 | End: 2023-08-24

## 2023-08-23 RX ORDER — NALOXONE HYDROCHLORIDE 0.4 MG/ML
0.08 INJECTION, SOLUTION INTRAMUSCULAR; INTRAVENOUS; SUBCUTANEOUS AS NEEDED
Status: DISCONTINUED | OUTPATIENT
Start: 2023-08-23 | End: 2023-08-23 | Stop reason: HOSPADM

## 2023-08-23 RX ORDER — HYDROMORPHONE HYDROCHLORIDE 1 MG/ML
0.2 INJECTION, SOLUTION INTRAMUSCULAR; INTRAVENOUS; SUBCUTANEOUS EVERY 5 MIN PRN
Status: DISCONTINUED | OUTPATIENT
Start: 2023-08-23 | End: 2023-08-23 | Stop reason: HOSPADM

## 2023-08-23 RX ORDER — TRANEXAMIC ACID 10 MG/ML
INJECTION, SOLUTION INTRAVENOUS AS NEEDED
Status: DISCONTINUED | OUTPATIENT
Start: 2023-08-23 | End: 2023-08-23 | Stop reason: SURG

## 2023-08-23 RX ORDER — HYDROMORPHONE HYDROCHLORIDE 1 MG/ML
0.4 INJECTION, SOLUTION INTRAMUSCULAR; INTRAVENOUS; SUBCUTANEOUS EVERY 5 MIN PRN
Status: DISCONTINUED | OUTPATIENT
Start: 2023-08-23 | End: 2023-08-23 | Stop reason: HOSPADM

## 2023-08-23 RX ORDER — MIDAZOLAM HYDROCHLORIDE 1 MG/ML
1 INJECTION INTRAMUSCULAR; INTRAVENOUS EVERY 5 MIN PRN
Status: DISCONTINUED | OUTPATIENT
Start: 2023-08-23 | End: 2023-08-23 | Stop reason: HOSPADM

## 2023-08-23 RX ORDER — LIDOCAINE HYDROCHLORIDE AND EPINEPHRINE 10; 10 MG/ML; UG/ML
INJECTION, SOLUTION INFILTRATION; PERINEURAL AS NEEDED
Status: DISCONTINUED | OUTPATIENT
Start: 2023-08-23 | End: 2023-08-23 | Stop reason: HOSPADM

## 2023-08-23 RX ORDER — DEXAMETHASONE SODIUM PHOSPHATE 4 MG/ML
VIAL (ML) INJECTION AS NEEDED
Status: DISCONTINUED | OUTPATIENT
Start: 2023-08-23 | End: 2023-08-23 | Stop reason: SURG

## 2023-08-23 RX ORDER — DEXTROSE MONOHYDRATE 25 G/50ML
50 INJECTION, SOLUTION INTRAVENOUS
Status: DISCONTINUED | OUTPATIENT
Start: 2023-08-23 | End: 2023-08-23 | Stop reason: HOSPADM

## 2023-08-23 RX ORDER — NICOTINE POLACRILEX 4 MG
30 LOZENGE BUCCAL
Status: DISCONTINUED | OUTPATIENT
Start: 2023-08-23 | End: 2023-08-23 | Stop reason: HOSPADM

## 2023-08-23 RX ORDER — HYDROMORPHONE HYDROCHLORIDE 1 MG/ML
INJECTION, SOLUTION INTRAMUSCULAR; INTRAVENOUS; SUBCUTANEOUS
Status: COMPLETED
Start: 2023-08-23 | End: 2023-08-23

## 2023-08-23 RX ORDER — PROCHLORPERAZINE EDISYLATE 5 MG/ML
5 INJECTION INTRAMUSCULAR; INTRAVENOUS EVERY 8 HOURS PRN
Status: DISCONTINUED | OUTPATIENT
Start: 2023-08-23 | End: 2023-08-23 | Stop reason: HOSPADM

## 2023-08-23 RX ORDER — MIDAZOLAM HYDROCHLORIDE 1 MG/ML
INJECTION INTRAMUSCULAR; INTRAVENOUS AS NEEDED
Status: DISCONTINUED | OUTPATIENT
Start: 2023-08-23 | End: 2023-08-23 | Stop reason: SURG

## 2023-08-23 RX ORDER — SODIUM CHLORIDE, SODIUM LACTATE, POTASSIUM CHLORIDE, CALCIUM CHLORIDE 600; 310; 30; 20 MG/100ML; MG/100ML; MG/100ML; MG/100ML
INJECTION, SOLUTION INTRAVENOUS CONTINUOUS
Status: DISCONTINUED | OUTPATIENT
Start: 2023-08-23 | End: 2023-08-24

## 2023-08-23 RX ORDER — ACETAMINOPHEN 500 MG
1000 TABLET ORAL ONCE AS NEEDED
Status: DISCONTINUED | OUTPATIENT
Start: 2023-08-23 | End: 2023-08-23 | Stop reason: HOSPADM

## 2023-08-23 RX ORDER — HYDROMORPHONE HYDROCHLORIDE 1 MG/ML
0.6 INJECTION, SOLUTION INTRAMUSCULAR; INTRAVENOUS; SUBCUTANEOUS EVERY 5 MIN PRN
Status: DISCONTINUED | OUTPATIENT
Start: 2023-08-23 | End: 2023-08-23 | Stop reason: HOSPADM

## 2023-08-23 RX ORDER — METOPROLOL SUCCINATE 25 MG/1
25 TABLET, EXTENDED RELEASE ORAL
Status: DISCONTINUED | OUTPATIENT
Start: 2023-08-24 | End: 2023-08-24

## 2023-08-23 RX ADMIN — EPHEDRINE SULFATE 5 MG: 50 INJECTION INTRAVENOUS at 08:01:00

## 2023-08-23 RX ADMIN — ROCURONIUM BROMIDE 5 MG: 10 INJECTION, SOLUTION INTRAVENOUS at 07:13:00

## 2023-08-23 RX ADMIN — SODIUM CHLORIDE, SODIUM LACTATE, POTASSIUM CHLORIDE, CALCIUM CHLORIDE: 600; 310; 30; 20 INJECTION, SOLUTION INTRAVENOUS at 08:36:00

## 2023-08-23 RX ADMIN — LIDOCAINE HYDROCHLORIDE 50 MG: 10 INJECTION, SOLUTION EPIDURAL; INFILTRATION; INTRACAUDAL; PERINEURAL at 08:28:00

## 2023-08-23 RX ADMIN — TRANEXAMIC ACID 1000 MG: 10 INJECTION, SOLUTION INTRAVENOUS at 07:48:00

## 2023-08-23 RX ADMIN — SODIUM CHLORIDE, SODIUM LACTATE, POTASSIUM CHLORIDE, CALCIUM CHLORIDE: 600; 310; 30; 20 INJECTION, SOLUTION INTRAVENOUS at 07:09:00

## 2023-08-23 RX ADMIN — LIDOCAINE HYDROCHLORIDE 50 MG: 10 INJECTION, SOLUTION EPIDURAL; INFILTRATION; INTRACAUDAL; PERINEURAL at 07:13:00

## 2023-08-23 RX ADMIN — MIDAZOLAM HYDROCHLORIDE 2 MG: 1 INJECTION INTRAMUSCULAR; INTRAVENOUS at 07:09:00

## 2023-08-23 RX ADMIN — EPHEDRINE SULFATE 5 MG: 50 INJECTION INTRAVENOUS at 07:56:00

## 2023-08-23 RX ADMIN — EPHEDRINE SULFATE 5 MG: 50 INJECTION INTRAVENOUS at 07:39:00

## 2023-08-23 RX ADMIN — DEXAMETHASONE SODIUM PHOSPHATE 8 MG: 4 MG/ML VIAL (ML) INJECTION at 07:32:00

## 2023-08-23 RX ADMIN — EPHEDRINE SULFATE 5 MG: 50 INJECTION INTRAVENOUS at 07:49:00

## 2023-08-23 RX ADMIN — EPHEDRINE SULFATE 10 MG: 50 INJECTION INTRAVENOUS at 07:31:00

## 2023-08-23 NOTE — BRIEF OP NOTE
Pre-Operative Diagnosis: THYROID DISORDER  MULTIPLE THYROID NODULES  LOCALIZED SWELLIN, MASS AND LUMP, NECK     Post-Operative Diagnosis: THYROID DISORDERMULTIPLE THYROID NODULESLOCALIZED SWELLIN, MASS AND LUMP, NECK      Procedure Performed:   Left thryoid lobectomy     Surgeon(s) and Role:     Sarah Shaikh MD - Primary    Assistant(s):  Surgical Assistant.: Thomas Peña     Surgical Findings: stuck thyroid poor tissue      Specimen: to path      Estimated Blood Loss: Blood Output: 5 mL (8/23/2023  8:33 AM)      Dictation Number:      Alxeandria Honeycutt MD  8/23/2023  8:40 AM

## 2023-08-23 NOTE — ANESTHESIA PROCEDURE NOTES
Airway  Date/Time: 8/23/2023 7:16 AM  Urgency: elective    Airway not difficult    General Information and Staff    Patient location during procedure: OR  Anesthesiologist: Katalina Ervin MD  Resident/CRNA: Darrian Torres CRNA  Performed: CRNA   Performed by: Darrian Torres CRNA  Authorized by: Katalina Ervin MD      Indications and Patient Condition  Indications for airway management: anesthesia  Sedation level: deep  Preoxygenated: yes  Patient position: sniffing  Mask difficulty assessment: 1 - vent by mask    Final Airway Details  Final airway type: endotracheal airway      Successful airway: ETT and NIM tube  Cuffed: yes   Successful intubation technique: Video laryngoscopy  Facilitating devices/methods: intubating stylet  Endotracheal tube insertion site: oral  Blade: GlideScope  Blade size: #3  ETT size (mm): 7.0    Cormack-Lehane Classification: grade I - full view of glottis  Placement verified by: capnometry   Measured from: lips  ETT to lips (cm): 20  Number of attempts at approach: 1

## 2023-08-23 NOTE — INTERVAL H&P NOTE
Pre-op Diagnosis: THYROID DISORDER  MULTIPLE THYROID NODULES  LOCALIZED SWELLIN, MASS AND LUMP, NECK    The above referenced H&P was reviewed by Chantal Carrillo MD on 8/23/2023, the patient was examined and no significant changes have occurred in the patient's condition since the H&P was performed. I discussed with the patient and/or legal representative the potential benefits, risks and side effects of this procedure; the likelihood of the patient achieving goals; and potential problems that might occur during recuperation. I discussed reasonable alternatives to the procedure, including risks, benefits and side effects related to the alternatives and risks related to not receiving this procedure. We will proceed with procedure as planned.

## 2023-08-23 NOTE — PLAN OF CARE
POD#0 thyroidectomy  Up and walking to the bathroom. Ice on incision.      Problem: Patient/Family Goals  Goal: Patient/Family Long Term Goal  Description: Patient's Long Term Goal: go home     Interventions:  - walking around   Pain controlled     - See additional Care Plan goals for specific interventions  Outcome: Progressing  Goal: Patient/Family Short Term Goal  Description: Patient's Short Term Goal: pain controlled    Interventions:   - pain medication given as needed   - See additional Care Plan goals for specific interventions  Outcome: Progressing     Problem: PAIN - ADULT  Goal: Verbalizes/displays adequate comfort level or patient's stated pain goal  Description: INTERVENTIONS:  - Encourage pt to monitor pain and request assistance  - Assess pain using appropriate pain scale  - Administer analgesics based on type and severity of pain and evaluate response  - Implement non-pharmacological measures as appropriate and evaluate response  - Consider cultural and social influences on pain and pain management  - Manage/alleviate anxiety  - Utilize distraction and/or relaxation techniques  - Monitor for opioid side effects  - Notify MD/LIP if interventions unsuccessful or patient reports new pain  - Anticipate increased pain with activity and pre-medicate as appropriate  Outcome: Progressing

## 2023-08-23 NOTE — OPERATIVE REPORT
[facility]    PATIENT'S NAME: Edenilson Malone   ATTENDING PHYSICIAN: Chela Vargas M.D. OPERATING PHYSICIAN: Chela Vargas M.D. PATIENT ACCOUNT#:   [de-identified]    LOCATION:  [address] [place] [address]  MEDICAL RECORD #:   [id number]       YOB: 1970  ADMISSION DATE:       08/23/2023      OPERATION DATE:  08/23/2023    OPERATIVE REPORT      PREOPERATIVE DIAGNOSIS:  Left thyroid nodule. POSTOPERATIVE DIAGNOSIS:  Left thyroid nodule. PROCEDURE:  Left thyroid lobectomy and isthmusectomy. ASSISTANT:  JEFERSON Pena     ANESTHESIA:  General.    COMPLICATIONS:  None. DISPOSITION:  To recovery room, in stable condition. INDICATIONS:  This is a 52-year-old who has had problems with difficulty with persistent thyroid nodule on the left. Fine-needle aspiration showing atypical cells. Therefore, recommended to under left lobectomy and isthmusectomy. FINDINGS:  Scarred-in tissue, as well as very unhealthy subcutaneous tissue, probably secondary to previous CML treatment. OPERATIVE TECHNIQUE:  Patient brought to the operating room, placed in a supine position, and given a general anesthetic via endotracheal tube with laryngeal monitoring system. The patient was placed on a shoulder roll in head-extended position. An approximately 3 cm incision was made 2 fingerbreadths above the sternal notch. Subplatysmal flaps were elevated superiorly and inferiorly. Strap muscles were divided in the midline. The left thyroid lobe was mobilized using titanium clips and bipolar cautery for hemostasis. The recurrent laryngeal nerve was identified, preserved, and stimulated. The parathyroid tissue was dissected off the thyroid bed. The patient's frozen section was found to be benign. The wound was inspected, showing adequate hemostasis. Of note, the tissue were very friable and somewhat scarred from previous general management of her CML and health.   After the frozen section was found to be benign, the wound was closed in a layered fashion with 3-0 Vicryl through the strap muscles, 4-0 Monocryl subcutaneously, and Steri-Strips on the surface. The patient tolerated the procedure very well, was awoken from anesthetic and brought to recovery room in stable condition.         Dictated By Neel Perez M.D.  d: 08/23/2023 08:45:55  t: 08/23/2023 13:14:39  [facility] [id number]  HWD/

## 2023-08-24 VITALS
HEIGHT: 62 IN | WEIGHT: 177 LBS | SYSTOLIC BLOOD PRESSURE: 140 MMHG | RESPIRATION RATE: 16 BRPM | DIASTOLIC BLOOD PRESSURE: 60 MMHG | HEART RATE: 73 BPM | BODY MASS INDEX: 32.57 KG/M2 | TEMPERATURE: 98 F | OXYGEN SATURATION: 97 %

## 2023-08-24 NOTE — PLAN OF CARE
Assumed patient care this morning. Alert, awake. Breathing unlabored. Pain controlled. Tolerating oral intake. Incision site with steristrips; cdi. Cleared for discharge home.

## 2023-08-24 NOTE — DISCHARGE INSTRUCTIONS
Avoid any upward head motion or heavy lifting over 5 lbs for the next 2 weeks  Keep site dry for the next 2 weeks  Tylenol as needed for any pain   Follow up in office in 2 weeks   Call [phone] with any questions Detail Level: Detailed

## 2023-08-24 NOTE — PLAN OF CARE
Patient alert, interactive and cooperative with care. She denies chest pain and shortness of breath. No signs of distress noted. Airway patent. Her surgical incision is open to air with old dried bloody drainage. No signs of active bleeding noted. Ice pack applied PRN. Denies numbness and tingling. No sensation deficits. Speech clear. She is tolerating her ordered diet. Discussed pain management plan. Encouraged pt to keep HOB up 30 degrees and aspiration precautions maintained. Follow up labs ordered . Falls, aspiration  and safety precautions instructed.   Problem: SKIN/TISSUE INTEGRITY - ADULT  Goal: Incision(s), wounds(s) or drain site(s) healing without S/S of infection  Description: INTERVENTIONS:  - Assess and document dressing/incision, wound bed, sites and surrounding tissue  Outcome: Progressing     Problem: PAIN - ADULT  Goal: Verbalizes/displays adequate comfort level or patient's stated pain goal  Description: INTERVENTIONS:  - Encourage pt to monitor pain and request assistance  - Assess pain using appropriate pain scale  - Administer analgesics based on type and severity of pain and evaluate response  - Implement non-pharmacological measures as appropriate and evaluate response  - Consider cultural and social influences on pain and pain management  - Manage/alleviate anxiety  - Utilize distraction and/or relaxation techniques  - Monitor for opioid side effects  - Notify MD/LIP if interventions unsuccessful or patient reports new pain  - Anticipate increased pain with activity and pre-medicate as appropriate  Outcome: Progressing     Problem: Patient/Family Goals  Goal: Patient/Family Long Term Goal  Description: Description: Patient's Long Term Goal: stay healthy at home    Interventions:  -take medications as prescribed  -attend follow up appointments as recommended  -diet and exercise as advised  -report new or worsening symptoms to physician    Outcome: Progressing  Goal: Patient/Family Short Term Goal  Description: Patient's Short Term Goal:     8/23  NOC   \" no pain\"    Interventions:   - monitor for complaints of pain  -offer PRN pain meds    Outcome: Progressing

## 2023-08-28 DIAGNOSIS — C92.11 BCR/ABL1-POSITIVE CHRONIC MYELOID LEUKEMIA (CML) IN REMISSION (CMD): ICD-10-CM

## 2023-08-29 RX ORDER — IMATINIB MESYLATE 100 MG/1
TABLET, FILM COATED ORAL
Qty: 180 TABLET | Refills: 6 | Status: SHIPPED | OUTPATIENT
Start: 2023-08-29

## 2023-09-15 ENCOUNTER — LAB SERVICES (OUTPATIENT)
Dept: LAB | Age: 53
End: 2023-09-15
Attending: INTERNAL MEDICINE

## 2023-09-15 ENCOUNTER — E-ADVICE (OUTPATIENT)
Dept: HEMATOLOGY/ONCOLOGY | Age: 53
End: 2023-09-15

## 2023-09-15 ENCOUNTER — OFFICE VISIT (OUTPATIENT)
Dept: HEMATOLOGY/ONCOLOGY | Age: 53
End: 2023-09-15
Attending: INTERNAL MEDICINE

## 2023-09-15 ENCOUNTER — TELEPHONE (OUTPATIENT)
Dept: HEMATOLOGY/ONCOLOGY | Age: 53
End: 2023-09-15

## 2023-09-15 VITALS
TEMPERATURE: 97.5 F | DIASTOLIC BLOOD PRESSURE: 80 MMHG | OXYGEN SATURATION: 100 % | HEART RATE: 74 BPM | WEIGHT: 168.8 LBS | HEIGHT: 63 IN | BODY MASS INDEX: 29.91 KG/M2 | SYSTOLIC BLOOD PRESSURE: 167 MMHG

## 2023-09-15 DIAGNOSIS — Z86.39 HISTORY OF IRON DEFICIENCY: ICD-10-CM

## 2023-09-15 DIAGNOSIS — D64.9 ANEMIA, UNSPECIFIED TYPE: ICD-10-CM

## 2023-09-15 DIAGNOSIS — R59.9 LYMPH NODES ENLARGED: Primary | ICD-10-CM

## 2023-09-15 DIAGNOSIS — C92.11 BCR/ABL1-POSITIVE CHRONIC MYELOID LEUKEMIA (CML) IN REMISSION (CMD): ICD-10-CM

## 2023-09-15 LAB
ALBUMIN SERPL-MCNC: 3.8 G/DL (ref 3.6–5.1)
ALBUMIN/GLOB SERPL: 1.4 {RATIO} (ref 1–2.4)
ALP SERPL-CCNC: 55 UNITS/L (ref 45–117)
ALT SERPL-CCNC: 20 UNITS/L
ANION GAP SERPL CALC-SCNC: 8 MMOL/L (ref 7–19)
AST SERPL-CCNC: 22 UNITS/L
BASOPHILS # BLD: 0 K/MCL (ref 0–0.3)
BASOPHILS NFR BLD: 1 %
BILIRUB SERPL-MCNC: 0.3 MG/DL (ref 0.2–1)
BUN SERPL-MCNC: 14 MG/DL (ref 6–20)
BUN/CREAT SERPL: 12 (ref 7–25)
CALCIUM SERPL-MCNC: 8.3 MG/DL (ref 8.4–10.2)
CHLORIDE SERPL-SCNC: 109 MMOL/L (ref 97–110)
CO2 SERPL-SCNC: 29 MMOL/L (ref 21–32)
CREAT SERPL-MCNC: 1.13 MG/DL (ref 0.51–0.95)
DEPRECATED RDW RBC: 50.9 FL (ref 39–50)
EGFRCR SERPLBLD CKD-EPI 2021: 57 ML/MIN/{1.73_M2}
EOSINOPHIL # BLD: 1.1 K/MCL (ref 0–0.5)
EOSINOPHIL NFR BLD: 14 %
ERYTHROCYTE [DISTWIDTH] IN BLOOD: 13.8 % (ref 11–15)
FASTING DURATION TIME PATIENT: ABNORMAL H
FERRITIN SERPL-MCNC: 93 NG/ML (ref 8–252)
FOLATE SERPL-MCNC: 17.7 NG/ML
GLOBULIN SER-MCNC: 2.8 G/DL (ref 2–4)
GLUCOSE SERPL-MCNC: 121 MG/DL (ref 70–99)
HCT VFR BLD CALC: 32.4 % (ref 36–46.5)
HGB BLD-MCNC: 10.8 G/DL (ref 12–15.5)
IMM GRANULOCYTES # BLD AUTO: 0 K/MCL (ref 0–0.2)
IMM GRANULOCYTES # BLD: 0 %
IRON SATN MFR SERPL: 28 % (ref 15–45)
IRON SERPL-MCNC: 84 MCG/DL (ref 50–170)
LYMPHOCYTES # BLD: 1.4 K/MCL (ref 1–4)
LYMPHOCYTES NFR BLD: 18 %
MCH RBC QN AUTO: 33.6 PG (ref 26–34)
MCHC RBC AUTO-ENTMCNC: 33.3 G/DL (ref 32–36.5)
MCV RBC AUTO: 100.9 FL (ref 78–100)
MONOCYTES # BLD: 0.6 K/MCL (ref 0.3–0.9)
MONOCYTES NFR BLD: 7 %
NEUTROPHILS # BLD: 4.7 K/MCL (ref 1.8–7.7)
NEUTROPHILS NFR BLD: 60 %
NRBC BLD MANUAL-RTO: 0 /100 WBC
PLATELET # BLD AUTO: 188 K/MCL (ref 140–450)
POTASSIUM SERPL-SCNC: 3.4 MMOL/L (ref 3.4–5.1)
PROT SERPL-MCNC: 6.6 G/DL (ref 6.4–8.2)
RBC # BLD: 3.21 MIL/MCL (ref 4–5.2)
SODIUM SERPL-SCNC: 143 MMOL/L (ref 135–145)
TIBC SERPL-MCNC: 295 MCG/DL (ref 250–450)
VIT B12 SERPL-MCNC: 762 PG/ML (ref 211–911)
WBC # BLD: 7.8 K/MCL (ref 4.2–11)

## 2023-09-15 PROCEDURE — 82746 ASSAY OF FOLIC ACID SERUM: CPT

## 2023-09-15 PROCEDURE — 99215 OFFICE O/P EST HI 40 MIN: CPT | Performed by: INTERNAL MEDICINE

## 2023-09-15 PROCEDURE — 82728 ASSAY OF FERRITIN: CPT

## 2023-09-15 PROCEDURE — 99211 OFF/OP EST MAY X REQ PHY/QHP: CPT

## 2023-09-15 PROCEDURE — 85025 COMPLETE CBC W/AUTO DIFF WBC: CPT

## 2023-09-15 PROCEDURE — 81206 BCR/ABL1 GENE MAJOR BP: CPT

## 2023-09-15 PROCEDURE — 83540 ASSAY OF IRON: CPT

## 2023-09-15 PROCEDURE — 3079F DIAST BP 80-89 MM HG: CPT | Performed by: INTERNAL MEDICINE

## 2023-09-15 PROCEDURE — 36415 COLL VENOUS BLD VENIPUNCTURE: CPT

## 2023-09-15 PROCEDURE — 80053 COMPREHEN METABOLIC PANEL: CPT

## 2023-09-15 PROCEDURE — 3077F SYST BP >= 140 MM HG: CPT | Performed by: INTERNAL MEDICINE

## 2023-09-15 ASSESSMENT — PAIN SCALES - GENERAL: PAINLEVEL: 0

## 2023-09-19 LAB
SERVICE CMNT-IMP: NORMAL
T(ABL1,BCR)P210 BLD/T QL: NOT DETECTED

## 2023-09-20 ENCOUNTER — TELEPHONE (OUTPATIENT)
Dept: FAMILY MEDICINE CLINIC | Facility: CLINIC | Age: 53
End: 2023-09-20

## 2023-09-20 RX ORDER — SCOLOPAMINE TRANSDERMAL SYSTEM 1 MG/1
1 PATCH, EXTENDED RELEASE TRANSDERMAL ONCE
Status: CANCELLED | OUTPATIENT
Start: 2023-09-20 | End: 2023-09-20

## 2023-09-20 NOTE — TELEPHONE ENCOUNTER
Received pre op paperwork from Dr. Guera Chilel for Right Thyroid completion for Total Thyroidectomy. Patient had pre op for the same surgery on 8/10/23 with Dr. Nicki Olivo. She has also had clearance sent from [address] [place] [address]. Surgery was rescheduled from 8/23/23 to 10/6/23 will you addend the notes or does patient need a new pre op appointment. H&P is all that is needed. If addendum is done than please fax to [phone]. If new appointment is needed please send message back to front office to schedule.

## 2023-09-21 NOTE — TELEPHONE ENCOUNTER
I can do that - will you call and let patient know she does not need to come in?  She scheduled for this PM

## 2023-09-21 NOTE — TELEPHONE ENCOUNTER
Called and told patient she did not need to come in for a visit that Dr Miri Bahena would addend the previous note and then can print it and fax it to the surgeon.

## 2023-09-27 ENCOUNTER — HOSPITAL ENCOUNTER (OUTPATIENT)
Dept: NUCLEAR MEDICINE | Facility: HOSPITAL | Age: 53
Discharge: HOME | End: 2023-09-27
Attending: INTERNAL MEDICINE
Payer: COMMERCIAL

## 2023-09-27 DIAGNOSIS — R59.9 LYMPH NODES ENLARGED: ICD-10-CM

## 2023-09-27 LAB — GLUCOSE BLD-MCNC: 91 MG/DL (ref 70–99)

## 2023-09-27 PROCEDURE — 78815 PET IMAGE W/CT SKULL-THIGH: CPT | Performed by: INTERNAL MEDICINE

## 2023-09-27 PROCEDURE — 82962 GLUCOSE BLOOD TEST: CPT

## 2023-10-05 ENCOUNTER — ANESTHESIA EVENT (OUTPATIENT)
Dept: SURGERY | Facility: HOSPITAL | Age: 53
End: 2023-10-05
Payer: COMMERCIAL

## 2023-10-06 ENCOUNTER — ANESTHESIA (OUTPATIENT)
Dept: SURGERY | Facility: HOSPITAL | Age: 53
End: 2023-10-06
Payer: COMMERCIAL

## 2023-10-06 ENCOUNTER — HOSPITAL ENCOUNTER (OUTPATIENT)
Facility: HOSPITAL | Age: 53
Discharge: HOME | End: 2023-10-07
Attending: OTOLARYNGOLOGY | Admitting: OTOLARYNGOLOGY
Payer: COMMERCIAL

## 2023-10-06 PROBLEM — E83.51 HYPOCALCEMIA SYNDROME: Status: ACTIVE | Noted: 2023-10-06

## 2023-10-06 LAB
B-HCG UR QL: NEGATIVE
CALCIUM BLD-MCNC: 8 MG/DL (ref 8.5–10.1)
CALCIUM BLD-MCNC: 8.3 MG/DL (ref 8.5–10.1)
MAGNESIUM SERPL-MCNC: 1.8 MG/DL (ref 1.6–2.6)
MAGNESIUM SERPL-MCNC: 2.1 MG/DL (ref 1.6–2.6)
PTH-INTACT SERPL-MCNC: 16.7 PG/ML (ref 18.5–88)

## 2023-10-06 PROCEDURE — 82310 ASSAY OF CALCIUM: CPT | Performed by: OTOLARYNGOLOGY

## 2023-10-06 PROCEDURE — 83735 ASSAY OF MAGNESIUM: CPT | Performed by: OTOLARYNGOLOGY

## 2023-10-06 PROCEDURE — 88342 IMHCHEM/IMCYTCHM 1ST ANTB: CPT | Performed by: OTOLARYNGOLOGY

## 2023-10-06 PROCEDURE — 88307 TISSUE EXAM BY PATHOLOGIST: CPT | Performed by: OTOLARYNGOLOGY

## 2023-10-06 PROCEDURE — 81025 URINE PREGNANCY TEST: CPT

## 2023-10-06 PROCEDURE — 0GTH0ZZ RESECTION OF RIGHT THYROID GLAND LOBE, OPEN APPROACH: ICD-10-PCS | Performed by: OTOLARYNGOLOGY

## 2023-10-06 PROCEDURE — 83970 ASSAY OF PARATHORMONE: CPT | Performed by: OTOLARYNGOLOGY

## 2023-10-06 RX ORDER — CALCIUM CARBONATE 500(1250)
1000 TABLET ORAL
Status: DISCONTINUED | OUTPATIENT
Start: 2023-10-06 | End: 2023-10-07

## 2023-10-06 RX ORDER — HYDROMORPHONE HYDROCHLORIDE 1 MG/ML
INJECTION, SOLUTION INTRAMUSCULAR; INTRAVENOUS; SUBCUTANEOUS
Status: COMPLETED
Start: 2023-10-06 | End: 2023-10-06

## 2023-10-06 RX ORDER — CALCITRIOL 0.25 UG/1
0.25 CAPSULE, LIQUID FILLED ORAL ONCE
Status: COMPLETED | OUTPATIENT
Start: 2023-10-06 | End: 2023-10-06

## 2023-10-06 RX ORDER — HYDROCODONE BITARTRATE AND ACETAMINOPHEN 10; 325 MG/1; MG/1
2 TABLET ORAL ONCE AS NEEDED
Status: DISCONTINUED | OUTPATIENT
Start: 2023-10-06 | End: 2023-10-06 | Stop reason: HOSPADM

## 2023-10-06 RX ORDER — DEXTROSE, SODIUM CHLORIDE, SODIUM LACTATE, POTASSIUM CHLORIDE, AND CALCIUM CHLORIDE 5; .6; .31; .03; .02 G/100ML; G/100ML; G/100ML; G/100ML; G/100ML
INJECTION, SOLUTION INTRAVENOUS CONTINUOUS
Status: DISCONTINUED | OUTPATIENT
Start: 2023-10-06 | End: 2023-10-07

## 2023-10-06 RX ORDER — CALCITRIOL 0.25 UG/1
0.25 CAPSULE, LIQUID FILLED ORAL DAILY
Status: DISCONTINUED | OUTPATIENT
Start: 2023-10-06 | End: 2023-10-07

## 2023-10-06 RX ORDER — LISINOPRIL 40 MG/1
40 TABLET ORAL DAILY
Status: DISCONTINUED | OUTPATIENT
Start: 2023-10-06 | End: 2023-10-07

## 2023-10-06 RX ORDER — HYDROMORPHONE HYDROCHLORIDE 1 MG/ML
0.6 INJECTION, SOLUTION INTRAMUSCULAR; INTRAVENOUS; SUBCUTANEOUS EVERY 5 MIN PRN
Status: DISCONTINUED | OUTPATIENT
Start: 2023-10-06 | End: 2023-10-06 | Stop reason: HOSPADM

## 2023-10-06 RX ORDER — LIDOCAINE HYDROCHLORIDE AND EPINEPHRINE 10; 10 MG/ML; UG/ML
INJECTION, SOLUTION INFILTRATION; PERINEURAL AS NEEDED
Status: DISCONTINUED | OUTPATIENT
Start: 2023-10-06 | End: 2023-10-06 | Stop reason: HOSPADM

## 2023-10-06 RX ORDER — NICOTINE POLACRILEX 4 MG
15 LOZENGE BUCCAL
Status: DISCONTINUED | OUTPATIENT
Start: 2023-10-06 | End: 2023-10-06 | Stop reason: HOSPADM

## 2023-10-06 RX ORDER — NICOTINE POLACRILEX 4 MG
30 LOZENGE BUCCAL
Status: DISCONTINUED | OUTPATIENT
Start: 2023-10-06 | End: 2023-10-06 | Stop reason: HOSPADM

## 2023-10-06 RX ORDER — MAGNESIUM SULFATE HEPTAHYDRATE 500 MG/ML
INJECTION, SOLUTION INTRAMUSCULAR; INTRAVENOUS AS NEEDED
Status: DISCONTINUED | OUTPATIENT
Start: 2023-10-06 | End: 2023-10-06 | Stop reason: SURG

## 2023-10-06 RX ORDER — HYDROCODONE BITARTRATE AND ACETAMINOPHEN 10; 325 MG/1; MG/1
1 TABLET ORAL ONCE AS NEEDED
Status: DISCONTINUED | OUTPATIENT
Start: 2023-10-06 | End: 2023-10-06 | Stop reason: HOSPADM

## 2023-10-06 RX ORDER — DEXTROSE MONOHYDRATE 25 G/50ML
50 INJECTION, SOLUTION INTRAVENOUS
Status: DISCONTINUED | OUTPATIENT
Start: 2023-10-06 | End: 2023-10-06 | Stop reason: HOSPADM

## 2023-10-06 RX ORDER — HYDROMORPHONE HYDROCHLORIDE 1 MG/ML
0.4 INJECTION, SOLUTION INTRAMUSCULAR; INTRAVENOUS; SUBCUTANEOUS EVERY 5 MIN PRN
Status: DISCONTINUED | OUTPATIENT
Start: 2023-10-06 | End: 2023-10-06 | Stop reason: HOSPADM

## 2023-10-06 RX ORDER — MIDAZOLAM HYDROCHLORIDE 1 MG/ML
1 INJECTION INTRAMUSCULAR; INTRAVENOUS EVERY 5 MIN PRN
Status: DISCONTINUED | OUTPATIENT
Start: 2023-10-06 | End: 2023-10-06 | Stop reason: HOSPADM

## 2023-10-06 RX ORDER — ACETAMINOPHEN 500 MG
500 TABLET ORAL EVERY 6 HOURS PRN
Status: DISCONTINUED | OUTPATIENT
Start: 2023-10-06 | End: 2023-10-07

## 2023-10-06 RX ORDER — MEPERIDINE HYDROCHLORIDE 25 MG/ML
12.5 INJECTION INTRAMUSCULAR; INTRAVENOUS; SUBCUTANEOUS AS NEEDED
Status: DISCONTINUED | OUTPATIENT
Start: 2023-10-06 | End: 2023-10-06 | Stop reason: HOSPADM

## 2023-10-06 RX ORDER — CALCIUM CARBONATE 500(1250)
1000 TABLET ORAL ONCE
Status: COMPLETED | OUTPATIENT
Start: 2023-10-06 | End: 2023-10-06

## 2023-10-06 RX ORDER — NALOXONE HYDROCHLORIDE 0.4 MG/ML
80 INJECTION, SOLUTION INTRAMUSCULAR; INTRAVENOUS; SUBCUTANEOUS AS NEEDED
Status: DISCONTINUED | OUTPATIENT
Start: 2023-10-06 | End: 2023-10-06 | Stop reason: HOSPADM

## 2023-10-06 RX ORDER — SODIUM CHLORIDE, SODIUM LACTATE, POTASSIUM CHLORIDE, CALCIUM CHLORIDE 600; 310; 30; 20 MG/100ML; MG/100ML; MG/100ML; MG/100ML
INJECTION, SOLUTION INTRAVENOUS CONTINUOUS
Status: DISCONTINUED | OUTPATIENT
Start: 2023-10-06 | End: 2023-10-06 | Stop reason: HOSPADM

## 2023-10-06 RX ORDER — DEXAMETHASONE SODIUM PHOSPHATE 4 MG/ML
VIAL (ML) INJECTION AS NEEDED
Status: DISCONTINUED | OUTPATIENT
Start: 2023-10-06 | End: 2023-10-06 | Stop reason: SURG

## 2023-10-06 RX ORDER — PHENYLEPHRINE HCL 10 MG/ML
VIAL (ML) INJECTION AS NEEDED
Status: DISCONTINUED | OUTPATIENT
Start: 2023-10-06 | End: 2023-10-06 | Stop reason: SURG

## 2023-10-06 RX ORDER — CALCIUM CARBONATE 500(1250)
TABLET ORAL
Status: COMPLETED
Start: 2023-10-06 | End: 2023-10-06

## 2023-10-06 RX ORDER — CALCIUM GLUCONATE 20 MG/ML
2 INJECTION, SOLUTION INTRAVENOUS ONCE AS NEEDED
Status: ACTIVE | OUTPATIENT
Start: 2023-10-06 | End: 2023-10-06

## 2023-10-06 RX ORDER — ACETAMINOPHEN 500 MG
1000 TABLET ORAL ONCE AS NEEDED
Status: DISCONTINUED | OUTPATIENT
Start: 2023-10-06 | End: 2023-10-06 | Stop reason: HOSPADM

## 2023-10-06 RX ORDER — CALCITRIOL 0.25 UG/1
CAPSULE, LIQUID FILLED ORAL
Status: COMPLETED
Start: 2023-10-06 | End: 2023-10-06

## 2023-10-06 RX ORDER — METOPROLOL SUCCINATE 25 MG/1
25 TABLET, EXTENDED RELEASE ORAL DAILY
Status: DISCONTINUED | OUTPATIENT
Start: 2023-10-07 | End: 2023-10-07

## 2023-10-06 RX ORDER — TRANEXAMIC ACID 10 MG/ML
INJECTION, SOLUTION INTRAVENOUS AS NEEDED
Status: DISCONTINUED | OUTPATIENT
Start: 2023-10-06 | End: 2023-10-06 | Stop reason: SURG

## 2023-10-06 RX ORDER — ACETAMINOPHEN 500 MG
1000 TABLET ORAL ONCE
Status: DISCONTINUED | OUTPATIENT
Start: 2023-10-06 | End: 2023-10-06 | Stop reason: HOSPADM

## 2023-10-06 RX ORDER — ONDANSETRON 2 MG/ML
4 INJECTION INTRAMUSCULAR; INTRAVENOUS EVERY 6 HOURS PRN
Status: DISCONTINUED | OUTPATIENT
Start: 2023-10-06 | End: 2023-10-06 | Stop reason: HOSPADM

## 2023-10-06 RX ORDER — ONDANSETRON 2 MG/ML
INJECTION INTRAMUSCULAR; INTRAVENOUS AS NEEDED
Status: DISCONTINUED | OUTPATIENT
Start: 2023-10-06 | End: 2023-10-06 | Stop reason: SURG

## 2023-10-06 RX ORDER — LIDOCAINE HYDROCHLORIDE 10 MG/ML
INJECTION, SOLUTION EPIDURAL; INFILTRATION; INTRACAUDAL; PERINEURAL AS NEEDED
Status: DISCONTINUED | OUTPATIENT
Start: 2023-10-06 | End: 2023-10-06 | Stop reason: SURG

## 2023-10-06 RX ORDER — IBUPROFEN 200 MG
200 TABLET ORAL EVERY 6 HOURS PRN
Status: DISCONTINUED | OUTPATIENT
Start: 2023-10-06 | End: 2023-10-07

## 2023-10-06 RX ORDER — PROCHLORPERAZINE EDISYLATE 5 MG/ML
5 INJECTION INTRAMUSCULAR; INTRAVENOUS EVERY 8 HOURS PRN
Status: DISCONTINUED | OUTPATIENT
Start: 2023-10-06 | End: 2023-10-06 | Stop reason: HOSPADM

## 2023-10-06 RX ORDER — SODIUM CHLORIDE, SODIUM LACTATE, POTASSIUM CHLORIDE, CALCIUM CHLORIDE 600; 310; 30; 20 MG/100ML; MG/100ML; MG/100ML; MG/100ML
INJECTION, SOLUTION INTRAVENOUS CONTINUOUS
Status: DISCONTINUED | OUTPATIENT
Start: 2023-10-06 | End: 2023-10-07

## 2023-10-06 RX ORDER — HYDROMORPHONE HYDROCHLORIDE 1 MG/ML
0.2 INJECTION, SOLUTION INTRAMUSCULAR; INTRAVENOUS; SUBCUTANEOUS EVERY 5 MIN PRN
Status: DISCONTINUED | OUTPATIENT
Start: 2023-10-06 | End: 2023-10-06 | Stop reason: HOSPADM

## 2023-10-06 RX ORDER — LABETALOL HYDROCHLORIDE 5 MG/ML
5 INJECTION, SOLUTION INTRAVENOUS EVERY 5 MIN PRN
Status: DISCONTINUED | OUTPATIENT
Start: 2023-10-06 | End: 2023-10-06 | Stop reason: HOSPADM

## 2023-10-06 RX ORDER — ONDANSETRON 2 MG/ML
4 INJECTION INTRAMUSCULAR; INTRAVENOUS EVERY 6 HOURS PRN
Status: DISCONTINUED | OUTPATIENT
Start: 2023-10-06 | End: 2023-10-07

## 2023-10-06 RX ADMIN — TRANEXAMIC ACID: 10 INJECTION, SOLUTION INTRAVENOUS at 09:30:00

## 2023-10-06 RX ADMIN — TRANEXAMIC ACID 1000 MG: 10 INJECTION, SOLUTION INTRAVENOUS at 09:02:00

## 2023-10-06 RX ADMIN — PHENYLEPHRINE HCL 100 MCG: 10 MG/ML VIAL (ML) INJECTION at 08:22:00

## 2023-10-06 RX ADMIN — MAGNESIUM SULFATE HEPTAHYDRATE 1 G: 500 INJECTION, SOLUTION INTRAMUSCULAR; INTRAVENOUS at 08:16:00

## 2023-10-06 RX ADMIN — DEXAMETHASONE SODIUM PHOSPHATE 8 MG: 4 MG/ML VIAL (ML) INJECTION at 08:10:00

## 2023-10-06 RX ADMIN — PHENYLEPHRINE HCL 100 MCG: 10 MG/ML VIAL (ML) INJECTION at 09:00:00

## 2023-10-06 RX ADMIN — ONDANSETRON 4 MG: 2 INJECTION INTRAMUSCULAR; INTRAVENOUS at 09:22:00

## 2023-10-06 RX ADMIN — LIDOCAINE HYDROCHLORIDE 50 MG: 10 INJECTION, SOLUTION EPIDURAL; INFILTRATION; INTRACAUDAL; PERINEURAL at 08:10:00

## 2023-10-06 RX ADMIN — SODIUM CHLORIDE, SODIUM LACTATE, POTASSIUM CHLORIDE, CALCIUM CHLORIDE: 600; 310; 30; 20 INJECTION, SOLUTION INTRAVENOUS at 09:30:00

## 2023-10-06 NOTE — OPERATIVE REPORT
[facility]    PATIENT'S NAME: Miriam Reynolds   ATTENDING PHYSICIAN: Zakia Chairez M.D. OPERATING PHYSICIAN: Zakia Chairez M.D. PATIENT ACCOUNT#:   [de-identified]    LOCATION:  [address] [place] [address]  MEDICAL RECORD #:   [id number]       YOB: 1970  ADMISSION DATE:       10/06/2023      OPERATION DATE:  10/06/2023    OPERATIVE REPORT      PREOPERATIVE DIAGNOSIS:  Thyroid carcinoma. POSTOPERATIVE DIAGNOSIS:  Thyroid carcinoma. PROCEDURE:  Right completion total thyroidectomy. ASSISTANT:  JUANITO Cruz. COMPLICATIONS:  None. ESTIMATED BLOOD LOSS:  Approximately 20 mL. INDICATIONS:  This is a 61-year-old female who has previously had left thyroid lobectomy and isthmusectomy. She has been recommended to undergo the above operation. Understanding the risks and benefits including bleeding, infection, possibility for hoarseness, calcium problems, scarring, need for additional surgery in the future, possibility for need for radioactive iodine, patient signed the consent form. OPERATIVE TECHNIQUE:  Patient was brought to the operating room and placed in the supine position, given a general anesthetic via mask inhalation. Patient intubated with laryngeal monitoring tube. The patient was placed on a shoulder roll in a head extended position. An approximately 4 cm incision was made 2 fingerbreadths above the sternal notch. This was the old incision site. The incision was excised. Patient previously has been treated for leukemia and tissues were very poorly healed from previous surgery. The right thyroid lobe was mobilized, finding scar tissue in the lateral gutter. The parathyroid tissue in the superior pole was inflamed and stuck to the thyroid gland and, therefore, devascularized. This was removed, minced into small pieces, and transplanted into an anterior strap muscle. The thyroid was mobilized off the face of the trachea.   The recurrent laryngeal nerve was preserved. The patient tolerated the procedure very well. The wound was reinspected, showing adequate hemostasis with topical Teresa placed. The wound was closed with 3-0 Vicryl through the strap muscles, 4-0 Monocryl subcutaneously, Steri-Strips on the surface.       Dictated By Debbe Libman, M.D.  d: 10/06/2023 11:50:24  t: 10/06/2023 13:12:38  [facility] [id number]  JJD/    cc: Debbe Libman, M.D.

## 2023-10-06 NOTE — ANESTHESIA PROCEDURE NOTES
Airway  Date/Time: 10/6/2023 8:13 AM  Urgency: elective      General Information and Staff    Patient location during procedure: OR  Anesthesiologist: Gold Baldwin MD  Performed: anesthesiologist   Performed by: Gold Baldwin MD  Authorized by: Gold Baldwin MD      Indications and Patient Condition  Indications for airway management: anesthesia  Sedation level: deep  Preoxygenated: yes  Patient position: sniffing  Mask difficulty assessment: 1 - vent by mask    Final Airway Details  Final airway type: endotracheal airway      Successful airway: ETT and NIM tube  Cuffed: yes   Successful intubation technique: direct laryngoscopy  Endotracheal tube insertion site: oral  Blade: GlideScope  Blade size: #4  ETT size (mm): 7.0    Cormack-Lehane Classification: grade I - full view of glottis  Placement verified by: capnometry   Measured from: lips  Number of attempts at approach: 1

## 2023-10-06 NOTE — PROGRESS NOTES
Vss. Pt a&ox4. Pt on ra with 02 sats wnl. Lungs cta. Pt denies difficulty breathing or sob. Denies chest pain. Pt denies n/t or cramping. Denies n/v. Tolerating clear liquid diet, will advance as tolerated. Pt remains dtv. Anterior neck incision c/d/I with steri strips in place. Ivf infusing, site intact. Poc updated with pt and pt shows understanding. Will monitor.

## 2023-10-06 NOTE — BRIEF OP NOTE
Pre-Operative Diagnosis: MUDULLARY THYROID CARCINOMA     Post-Operative Diagnosis: MUDULLARY THYROID CARCINOMA      Procedure Performed:   RIGHT THYROID COMPLETION FOR TOTAL THYROIDECTOMY, transplantation right superior parathryoid into right anterior stratissmus    Surgeon(s) and Role:     Endy Bolden MD - Primary    Assistant(s):  Surgical Assistant.: Enrike Branham     Surgical Findings: scar      Specimen: to path      Estimated Blood Loss: Blood Output: 20 mL (10/6/2023  9:13 AM)      Dictation Number:     Jovanni De Leon MD  10/6/2023  9:45 AM

## 2023-10-07 VITALS
OXYGEN SATURATION: 97 % | RESPIRATION RATE: 16 BRPM | HEART RATE: 74 BPM | HEIGHT: 62 IN | DIASTOLIC BLOOD PRESSURE: 70 MMHG | BODY MASS INDEX: 31.24 KG/M2 | TEMPERATURE: 98 F | SYSTOLIC BLOOD PRESSURE: 155 MMHG | WEIGHT: 169.75 LBS

## 2023-10-07 LAB
CALCIUM BLD-MCNC: 8.5 MG/DL (ref 8.5–10.1)
MAGNESIUM SERPL-MCNC: 1.4 MG/DL (ref 1.6–2.6)

## 2023-10-07 PROCEDURE — 82310 ASSAY OF CALCIUM: CPT | Performed by: OTOLARYNGOLOGY

## 2023-10-07 PROCEDURE — 83735 ASSAY OF MAGNESIUM: CPT | Performed by: OTOLARYNGOLOGY

## 2023-10-07 RX ORDER — CALCITRIOL 0.25 UG/1
0.25 CAPSULE, LIQUID FILLED ORAL 2 TIMES DAILY
Qty: 60 CAPSULE | Refills: 0 | Status: SHIPPED | OUTPATIENT
Start: 2023-10-07

## 2023-10-07 RX ORDER — MAGNESIUM CHLORIDE 71.5 G/G
2 TABLET ORAL DAILY
Qty: 60 TABLET | Refills: 0 | Status: SHIPPED | OUTPATIENT
Start: 2023-10-07

## 2023-10-07 RX ORDER — CALCIUM CARBONATE 500(1250)
1000 TABLET ORAL 2 TIMES DAILY WITH MEALS
Qty: 120 TABLET | Refills: 0 | Status: SHIPPED | OUTPATIENT
Start: 2023-10-07

## 2023-10-07 RX ORDER — MAGNESIUM SULFATE 1 G/100ML
1 INJECTION INTRAVENOUS ONCE
Status: COMPLETED | OUTPATIENT
Start: 2023-10-07 | End: 2023-10-07

## 2023-10-07 NOTE — PLAN OF CARE
Alert and orient. Room air. Steri strips intact to anterior neck. Ice pack applied. PRN Tylenol effective in pain control. Tolerating diet. Denies nausea. Up with assist. Voiding freely.  Reviewed plan of care, labs in am.

## 2023-10-07 NOTE — DISCHARGE INSTRUCTIONS
Avoid any upward head motion or heavy lifting over 5 lbs for the next 2 weeks  Keep site dry for the next 2 weeks  Tylenol as needed for any pain  If any numbness tingling or muscle cramping take 2 tums and call the office immediately at [phone]  You will begin taking oscal 1000mg twice daily, rocaltrol 0.25mcg twice daily and slow mag 2 tab daily   Follow up in office in 2 weeks

## 2023-10-10 ENCOUNTER — TELEPHONE (OUTPATIENT)
Dept: FAMILY MEDICINE CLINIC | Facility: CLINIC | Age: 53
End: 2023-10-10

## 2023-10-10 DIAGNOSIS — E83.42 HYPOMAGNESEMIA: ICD-10-CM

## 2023-10-10 DIAGNOSIS — Z13.6 SCREENING FOR CARDIOVASCULAR CONDITION: Primary | ICD-10-CM

## 2023-10-10 NOTE — TELEPHONE ENCOUNTER
Please enter lab orders for the patient's upcoming physical appointment. Physical scheduled: Your appointments       Date & Time Appointment Department [facility])    Oct 26, 2023  9:30 AM CDT Adult Physical with Rocky Shepard MD [facility], [address], [place] ([address])    PLEASE NOTE - Most insurances allow a Complete Physical once every 366 days. Please schedule accordingly. Please arrive 15 minutes prior to your scheduled appointment. Please also bring your Insurance card, Photo ID, and your medication bottles or a list of your current medication. If you no longer require this appointment, please contact your physician office to cancel. [place] [address] [phone]           Preferred lab: [address] [facility])     The patient has been notified to complete fasting labs prior to their physical appointment.

## 2023-10-20 ENCOUNTER — LAB ENCOUNTER (OUTPATIENT)
Dept: LAB | Age: 53
End: 2023-10-20
Attending: STUDENT IN AN ORGANIZED HEALTH CARE EDUCATION/TRAINING PROGRAM

## 2023-10-20 DIAGNOSIS — Z13.6 SCREENING FOR CARDIOVASCULAR CONDITION: ICD-10-CM

## 2023-10-20 DIAGNOSIS — E83.42 HYPOMAGNESEMIA: ICD-10-CM

## 2023-10-20 LAB
ALBUMIN SERPL-MCNC: 3.8 G/DL (ref 3.4–5)
ALBUMIN/GLOB SERPL: 1.4 {RATIO} (ref 1–2)
ALP LIVER SERPL-CCNC: 55 U/L
ALT SERPL-CCNC: 24 U/L
ANION GAP SERPL CALC-SCNC: 8 MMOL/L (ref 0–18)
AST SERPL-CCNC: 33 U/L (ref 15–37)
BASOPHILS # BLD AUTO: 0.08 X10(3) UL (ref 0–0.2)
BASOPHILS NFR BLD AUTO: 0.7 %
BILIRUB SERPL-MCNC: 0.3 MG/DL (ref 0.1–2)
BUN BLD-MCNC: 11 MG/DL (ref 7–18)
CALCIUM BLD-MCNC: 9.2 MG/DL (ref 8.5–10.1)
CHLORIDE SERPL-SCNC: 104 MMOL/L (ref 98–112)
CHOLEST SERPL-MCNC: 143 MG/DL (ref ?–200)
CO2 SERPL-SCNC: 29 MMOL/L (ref 21–32)
CREAT BLD-MCNC: 1.78 MG/DL
EGFRCR SERPLBLD CKD-EPI 2021: 33 ML/MIN/1.73M2 (ref 60–?)
EOSINOPHIL # BLD AUTO: 0.45 X10(3) UL (ref 0–0.7)
EOSINOPHIL NFR BLD AUTO: 4 %
ERYTHROCYTE [DISTWIDTH] IN BLOOD BY AUTOMATED COUNT: 13.4 %
FASTING PATIENT LIPID ANSWER: YES
FASTING STATUS PATIENT QL REPORTED: YES
GLOBULIN PLAS-MCNC: 2.8 G/DL (ref 2.8–4.4)
GLUCOSE BLD-MCNC: 106 MG/DL (ref 70–99)
HCT VFR BLD AUTO: 31.8 %
HDLC SERPL-MCNC: 31 MG/DL (ref 40–59)
HGB BLD-MCNC: 10.9 G/DL
IMM GRANULOCYTES # BLD AUTO: 0.03 X10(3) UL (ref 0–1)
IMM GRANULOCYTES NFR BLD: 0.3 %
LDLC SERPL CALC-MCNC: 71 MG/DL (ref ?–100)
LYMPHOCYTES # BLD AUTO: 1.94 X10(3) UL (ref 1–4)
LYMPHOCYTES NFR BLD AUTO: 17.1 %
MAGNESIUM SERPL-MCNC: 1.5 MG/DL (ref 1.6–2.6)
MCH RBC QN AUTO: 33.3 PG (ref 26–34)
MCHC RBC AUTO-ENTMCNC: 34.3 G/DL (ref 31–37)
MCV RBC AUTO: 97.2 FL
MONOCYTES # BLD AUTO: 0.64 X10(3) UL (ref 0.1–1)
MONOCYTES NFR BLD AUTO: 5.6 %
NEUTROPHILS # BLD AUTO: 8.23 X10 (3) UL (ref 1.5–7.7)
NEUTROPHILS # BLD AUTO: 8.23 X10(3) UL (ref 1.5–7.7)
NEUTROPHILS NFR BLD AUTO: 72.3 %
NONHDLC SERPL-MCNC: 112 MG/DL (ref ?–130)
OSMOLALITY SERPL CALC.SUM OF ELEC: 292 MOSM/KG (ref 275–295)
PLATELET # BLD AUTO: 231 10(3)UL (ref 150–450)
POTASSIUM SERPL-SCNC: 3.3 MMOL/L (ref 3.5–5.1)
PROT SERPL-MCNC: 6.6 G/DL (ref 6.4–8.2)
RBC # BLD AUTO: 3.27 X10(6)UL
SODIUM SERPL-SCNC: 141 MMOL/L (ref 136–145)
TRIGL SERPL-MCNC: 252 MG/DL (ref 30–149)
VLDLC SERPL CALC-MCNC: 39 MG/DL (ref 0–30)
WBC # BLD AUTO: 11.4 X10(3) UL (ref 4–11)

## 2023-10-20 PROCEDURE — 83735 ASSAY OF MAGNESIUM: CPT

## 2023-10-20 PROCEDURE — 80053 COMPREHEN METABOLIC PANEL: CPT

## 2023-10-20 PROCEDURE — 85025 COMPLETE CBC W/AUTO DIFF WBC: CPT

## 2023-10-20 PROCEDURE — 80061 LIPID PANEL: CPT

## 2023-10-20 PROCEDURE — 36415 COLL VENOUS BLD VENIPUNCTURE: CPT

## 2023-10-26 ENCOUNTER — OFFICE VISIT (OUTPATIENT)
Dept: FAMILY MEDICINE CLINIC | Facility: CLINIC | Age: 53
End: 2023-10-26

## 2023-10-26 VITALS
RESPIRATION RATE: 16 BRPM | TEMPERATURE: 99 F | DIASTOLIC BLOOD PRESSURE: 76 MMHG | WEIGHT: 170.38 LBS | BODY MASS INDEX: 30.57 KG/M2 | HEIGHT: 62.75 IN | SYSTOLIC BLOOD PRESSURE: 138 MMHG | HEART RATE: 76 BPM

## 2023-10-26 DIAGNOSIS — C92.11 BCR/ABL1-POSITIVE CHRONIC MYELOID LEUKEMIA (CML) IN REMISSION (HCC): ICD-10-CM

## 2023-10-26 DIAGNOSIS — E87.6 HYPOKALEMIA: ICD-10-CM

## 2023-10-26 DIAGNOSIS — Z23 NEED FOR VACCINATION: ICD-10-CM

## 2023-10-26 DIAGNOSIS — C92.10 CML (CHRONIC MYELOID LEUKEMIA) (HCC): ICD-10-CM

## 2023-10-26 DIAGNOSIS — I10 ESSENTIAL HYPERTENSION, BENIGN: ICD-10-CM

## 2023-10-26 DIAGNOSIS — Z12.31 ENCOUNTER FOR SCREENING MAMMOGRAM FOR BREAST CANCER: ICD-10-CM

## 2023-10-26 DIAGNOSIS — Z00.00 ENCOUNTER FOR ROUTINE HISTORY AND PHYSICAL EXAMINATION: Primary | ICD-10-CM

## 2023-10-26 DIAGNOSIS — I48.91 ATRIAL FIBRILLATION, UNSPECIFIED TYPE (HCC): ICD-10-CM

## 2023-10-26 DIAGNOSIS — D63.8 ANEMIA OF CHRONIC DISEASE: ICD-10-CM

## 2023-10-26 PROBLEM — Z01.818 PRE-OP TESTING: Status: RESOLVED | Noted: 2023-08-23 | Resolved: 2023-10-26

## 2023-10-26 PROBLEM — R55 SYNCOPE, NEAR: Status: RESOLVED | Noted: 2023-01-29 | Resolved: 2023-10-26

## 2023-10-26 PROBLEM — D64.9 ANEMIA: Status: RESOLVED | Noted: 2023-01-29 | Resolved: 2023-10-26

## 2023-10-26 PROBLEM — R10.13 ABDOMINAL PAIN, EPIGASTRIC: Status: RESOLVED | Noted: 2023-01-29 | Resolved: 2023-10-26

## 2023-10-26 PROCEDURE — 99396 PREV VISIT EST AGE 40-64: CPT | Performed by: STUDENT IN AN ORGANIZED HEALTH CARE EDUCATION/TRAINING PROGRAM

## 2023-10-26 PROCEDURE — 3008F BODY MASS INDEX DOCD: CPT | Performed by: STUDENT IN AN ORGANIZED HEALTH CARE EDUCATION/TRAINING PROGRAM

## 2023-10-26 PROCEDURE — 3075F SYST BP GE 130 - 139MM HG: CPT | Performed by: STUDENT IN AN ORGANIZED HEALTH CARE EDUCATION/TRAINING PROGRAM

## 2023-10-26 PROCEDURE — 3078F DIAST BP <80 MM HG: CPT | Performed by: STUDENT IN AN ORGANIZED HEALTH CARE EDUCATION/TRAINING PROGRAM

## 2023-10-26 PROCEDURE — 90686 IIV4 VACC NO PRSV 0.5 ML IM: CPT | Performed by: STUDENT IN AN ORGANIZED HEALTH CARE EDUCATION/TRAINING PROGRAM

## 2023-10-26 PROCEDURE — 90472 IMMUNIZATION ADMIN EACH ADD: CPT | Performed by: STUDENT IN AN ORGANIZED HEALTH CARE EDUCATION/TRAINING PROGRAM

## 2023-10-26 PROCEDURE — 90715 TDAP VACCINE 7 YRS/> IM: CPT | Performed by: STUDENT IN AN ORGANIZED HEALTH CARE EDUCATION/TRAINING PROGRAM

## 2023-10-26 PROCEDURE — 90471 IMMUNIZATION ADMIN: CPT | Performed by: STUDENT IN AN ORGANIZED HEALTH CARE EDUCATION/TRAINING PROGRAM

## 2023-10-26 RX ORDER — POTASSIUM CHLORIDE 1500 MG/1
20 TABLET, EXTENDED RELEASE ORAL DAILY
Qty: 4 TABLET | Refills: 0 | Status: CANCELLED | OUTPATIENT
Start: 2023-10-26 | End: 2023-10-30

## 2023-10-26 RX ORDER — LEVOTHYROXINE SODIUM 0.1 MG/1
100 TABLET ORAL DAILY
COMMUNITY
Start: 2023-10-16 | End: 2024-10-10

## 2023-11-24 ENCOUNTER — HOSPITAL ENCOUNTER (OUTPATIENT)
Dept: MAMMOGRAPHY | Age: 53
Discharge: HOME | End: 2023-11-24
Attending: STUDENT IN AN ORGANIZED HEALTH CARE EDUCATION/TRAINING PROGRAM
Payer: COMMERCIAL

## 2023-11-24 DIAGNOSIS — Z12.31 ENCOUNTER FOR SCREENING MAMMOGRAM FOR BREAST CANCER: ICD-10-CM

## 2023-11-24 PROCEDURE — 77067 SCR MAMMO BI INCL CAD: CPT | Performed by: STUDENT IN AN ORGANIZED HEALTH CARE EDUCATION/TRAINING PROGRAM

## 2023-11-24 PROCEDURE — 77063 BREAST TOMOSYNTHESIS BI: CPT | Performed by: STUDENT IN AN ORGANIZED HEALTH CARE EDUCATION/TRAINING PROGRAM

## 2023-11-28 ENCOUNTER — HOSPITAL ENCOUNTER (OUTPATIENT)
Dept: MAMMOGRAPHY | Facility: HOSPITAL | Age: 53
Discharge: HOME | End: 2023-11-28
Attending: STUDENT IN AN ORGANIZED HEALTH CARE EDUCATION/TRAINING PROGRAM
Payer: COMMERCIAL

## 2023-11-28 DIAGNOSIS — R92.2 INCONCLUSIVE MAMMOGRAM: ICD-10-CM

## 2023-11-28 PROCEDURE — 77065 DX MAMMO INCL CAD UNI: CPT | Performed by: STUDENT IN AN ORGANIZED HEALTH CARE EDUCATION/TRAINING PROGRAM

## 2023-11-28 PROCEDURE — 77061 BREAST TOMOSYNTHESIS UNI: CPT | Performed by: STUDENT IN AN ORGANIZED HEALTH CARE EDUCATION/TRAINING PROGRAM

## 2023-12-09 DIAGNOSIS — I10 ESSENTIAL HYPERTENSION, BENIGN: ICD-10-CM

## 2023-12-11 RX ORDER — LISINOPRIL 40 MG/1
40 TABLET ORAL DAILY
Qty: 90 TABLET | Refills: 0 | Status: SHIPPED | OUTPATIENT
Start: 2023-12-11

## 2023-12-11 NOTE — TELEPHONE ENCOUNTER
Requested Prescriptions     Pending Prescriptions Disp Refills    lisinopril 40 MG Oral Tab 90 tablet 1     Sig: Take 1 tablet (40 mg total) by mouth daily. LOV 10/26/2023     Patient was asked to follow-up in:     Appointment scheduled: Visit date not found     Medication refilled per protocol.

## 2024-01-12 ENCOUNTER — APPOINTMENT (OUTPATIENT)
Dept: LAB | Age: 54
End: 2024-01-12
Attending: INTERNAL MEDICINE

## 2024-01-12 ENCOUNTER — APPOINTMENT (OUTPATIENT)
Dept: HEMATOLOGY/ONCOLOGY | Age: 54
End: 2024-01-12
Attending: INTERNAL MEDICINE

## 2024-01-18 ENCOUNTER — OFFICE VISIT (OUTPATIENT)
Dept: HEMATOLOGY/ONCOLOGY | Age: 54
End: 2024-01-18
Attending: INTERNAL MEDICINE

## 2024-01-18 ENCOUNTER — LAB SERVICES (OUTPATIENT)
Dept: LAB | Age: 54
End: 2024-01-18
Attending: INTERNAL MEDICINE

## 2024-01-18 VITALS
RESPIRATION RATE: 16 BRPM | TEMPERATURE: 97.5 F | BODY MASS INDEX: 29.06 KG/M2 | DIASTOLIC BLOOD PRESSURE: 68 MMHG | SYSTOLIC BLOOD PRESSURE: 130 MMHG | WEIGHT: 164 LBS | HEART RATE: 69 BPM | OXYGEN SATURATION: 99 % | HEIGHT: 63 IN

## 2024-01-18 DIAGNOSIS — D64.9 ANEMIA, UNSPECIFIED TYPE: ICD-10-CM

## 2024-01-18 DIAGNOSIS — C92.11 BCR/ABL1-POSITIVE CHRONIC MYELOID LEUKEMIA (CML) IN REMISSION (CMD): Primary | ICD-10-CM

## 2024-01-18 DIAGNOSIS — C92.11 BCR/ABL1-POSITIVE CHRONIC MYELOID LEUKEMIA (CML) IN REMISSION (CMD): ICD-10-CM

## 2024-01-18 LAB
ALBUMIN SERPL-MCNC: 4 G/DL (ref 3.6–5.1)
ALBUMIN/GLOB SERPL: 1.5 {RATIO} (ref 1–2.4)
ALP SERPL-CCNC: 63 UNITS/L (ref 45–117)
ALT SERPL-CCNC: 20 UNITS/L
ANION GAP SERPL CALC-SCNC: 9 MMOL/L (ref 7–19)
AST SERPL-CCNC: 24 UNITS/L
BASOPHILS # BLD: 0 K/MCL (ref 0–0.3)
BASOPHILS NFR BLD: 1 %
BILIRUB SERPL-MCNC: 0.3 MG/DL (ref 0.2–1)
BUN SERPL-MCNC: 11 MG/DL (ref 6–20)
BUN/CREAT SERPL: 8 (ref 7–25)
CALCIUM SERPL-MCNC: 8.2 MG/DL (ref 8.4–10.2)
CHLORIDE SERPL-SCNC: 108 MMOL/L (ref 97–110)
CO2 SERPL-SCNC: 30 MMOL/L (ref 21–32)
CREAT SERPL-MCNC: 1.32 MG/DL (ref 0.51–0.95)
DEPRECATED RDW RBC: 53.2 FL (ref 39–50)
EGFRCR SERPLBLD CKD-EPI 2021: 48 ML/MIN/{1.73_M2}
EOSINOPHIL # BLD: 0.4 K/MCL (ref 0–0.5)
EOSINOPHIL NFR BLD: 6 %
ERYTHROCYTE [DISTWIDTH] IN BLOOD: 13.9 % (ref 11–15)
FASTING DURATION TIME PATIENT: ABNORMAL H
FERRITIN SERPL-MCNC: 72 NG/ML (ref 8–252)
FOLATE SERPL-MCNC: 22.5 NG/ML
GLOBULIN SER-MCNC: 2.7 G/DL (ref 2–4)
GLUCOSE SERPL-MCNC: 108 MG/DL (ref 70–99)
HCT VFR BLD CALC: 29.6 % (ref 36–46.5)
HGB BLD-MCNC: 9.8 G/DL (ref 12–15.5)
IMM GRANULOCYTES # BLD AUTO: 0 K/MCL (ref 0–0.2)
IMM GRANULOCYTES # BLD: 0 %
IRON SATN MFR SERPL: 25 % (ref 15–45)
IRON SERPL-MCNC: 74 MCG/DL (ref 50–170)
LYMPHOCYTES # BLD: 1 K/MCL (ref 1–4)
LYMPHOCYTES NFR BLD: 14 %
MCH RBC QN AUTO: 34.3 PG (ref 26–34)
MCHC RBC AUTO-ENTMCNC: 33.1 G/DL (ref 32–36.5)
MCV RBC AUTO: 103.5 FL (ref 78–100)
MONOCYTES # BLD: 0.5 K/MCL (ref 0.3–0.9)
MONOCYTES NFR BLD: 7 %
NEUTROPHILS # BLD: 5.2 K/MCL (ref 1.8–7.7)
NEUTROPHILS NFR BLD: 72 %
NRBC BLD MANUAL-RTO: 0 /100 WBC
PLATELET # BLD AUTO: 167 K/MCL (ref 140–450)
POTASSIUM SERPL-SCNC: 3.7 MMOL/L (ref 3.4–5.1)
PROT SERPL-MCNC: 6.7 G/DL (ref 6.4–8.2)
RBC # BLD: 2.86 MIL/MCL (ref 4–5.2)
SODIUM SERPL-SCNC: 143 MMOL/L (ref 135–145)
TIBC SERPL-MCNC: 299 MCG/DL (ref 250–450)
VIT B12 SERPL-MCNC: 586 PG/ML (ref 211–911)
WBC # BLD: 7.2 K/MCL (ref 4.2–11)

## 2024-01-18 PROCEDURE — 99211 OFF/OP EST MAY X REQ PHY/QHP: CPT

## 2024-01-18 PROCEDURE — 83550 IRON BINDING TEST: CPT

## 2024-01-18 PROCEDURE — 99215 OFFICE O/P EST HI 40 MIN: CPT | Performed by: INTERNAL MEDICINE

## 2024-01-18 PROCEDURE — 81206 BCR/ABL1 GENE MAJOR BP: CPT

## 2024-01-18 PROCEDURE — 3078F DIAST BP <80 MM HG: CPT | Performed by: INTERNAL MEDICINE

## 2024-01-18 PROCEDURE — 82728 ASSAY OF FERRITIN: CPT

## 2024-01-18 PROCEDURE — 36415 COLL VENOUS BLD VENIPUNCTURE: CPT

## 2024-01-18 PROCEDURE — 85025 COMPLETE CBC W/AUTO DIFF WBC: CPT

## 2024-01-18 PROCEDURE — 82746 ASSAY OF FOLIC ACID SERUM: CPT

## 2024-01-18 PROCEDURE — 80053 COMPREHEN METABOLIC PANEL: CPT

## 2024-01-18 PROCEDURE — 3075F SYST BP GE 130 - 139MM HG: CPT | Performed by: INTERNAL MEDICINE

## 2024-01-18 RX ORDER — MAGNESIUM CHLORIDE 71.5 G/G
2 TABLET ORAL DAILY
COMMUNITY
Start: 2023-10-07 | End: 2024-02-01

## 2024-01-18 RX ORDER — LEVOTHYROXINE SODIUM 0.12 MG/1
125 TABLET ORAL
COMMUNITY
Start: 2023-12-28 | End: 2024-12-22

## 2024-01-18 RX ORDER — UREA 10 %
LOTION (ML) TOPICAL
COMMUNITY
Start: 2023-11-20

## 2024-01-18 ASSESSMENT — PATIENT HEALTH QUESTIONNAIRE - PHQ9
2. FEELING DOWN, DEPRESSED OR HOPELESS: NOT AT ALL
1. LITTLE INTEREST OR PLEASURE IN DOING THINGS: NOT AT ALL
CLINICAL INTERPRETATION OF PHQ2 SCORE: NO FURTHER SCREENING NEEDED
SUM OF ALL RESPONSES TO PHQ9 QUESTIONS 1 AND 2: 0
SUM OF ALL RESPONSES TO PHQ9 QUESTIONS 1 AND 2: 0

## 2024-01-18 ASSESSMENT — PAIN SCALES - GENERAL: PAINLEVEL: 2

## 2024-01-20 LAB
SERVICE CMNT-IMP: NORMAL
T(ABL1,BCR)P210 BLD/T QL: NOT DETECTED

## 2024-01-29 ENCOUNTER — OFFICE VISIT (OUTPATIENT)
Facility: LOCATION | Age: 54
End: 2024-01-29
Payer: COMMERCIAL

## 2024-01-29 VITALS — TEMPERATURE: 98 F | HEART RATE: 76 BPM

## 2024-01-29 DIAGNOSIS — K60.2 ANAL FISSURE: Primary | ICD-10-CM

## 2024-01-29 PROCEDURE — 99213 OFFICE O/P EST LOW 20 MIN: CPT | Performed by: STUDENT IN AN ORGANIZED HEALTH CARE EDUCATION/TRAINING PROGRAM

## 2024-02-01 NOTE — PROGRESS NOTES
Follow Up Visit Note       Active Problems      1. 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  She returns for ongoing follow-up today.  Patient has underwent treatment for thyroid cancer including 2 surgeries and adjuvant radioactive iodine therapy.  She continues to have intermittent anorectal pain.  The pain flared up before [place].  She is currently taking sitz bath's and using Calmoseptine to control pain.  She has been taking Colace lately and taking calcium supplements that have caused constipation.  She does admit to significant pain relief after the Botox procedure that lasted for around 6 months.  She denies any fecal incontinence.  She does have a history of a vaginal delivery with baby weighing 8 pounds and she believes she had an episiotomy.    Allergies  Adriana is allergic to magnesium.    Past Medical / Surgical / Social / Family History    The past medical and past surgical history have been reviewed by me today.    Past Medical History:   Diagnosis Date    Anal fissure     Anemia     Arrhythmia     A-FIB 2020- on metoprolol    Calculus of gallbladder without mention of cholecystitis or obstruction 06/29/2012    Calculus of kidney     Chronic myeloid leukemia, without mention of having achieved remission 06/29/2012    diagnosed 2003    CML (chronic myelocytic leukemia) (HCC)     Dysfunctional uterine bleeding     Dysmenorrhea     Essential hypertension, benign     Gestational diabetes mellitus     H/O mammogram 04/12/2012    BENIGN FINDINGS    High blood pressure     History of blood transfusion 2003    History of recent  hospitalization     X2 days at  - had abd pain, N/V low potassium and inflammation in Kidneys    Nausea and vomiting, unspecified vomiting type 01/29/2023    Pap smear for cervical cancer screening 03/30/2012    wnl neg hpv    Peptic ulcer     Personal history of antineoplastic chemotherapy     Thyroid cancer (HCC)     Surgery- in march and now scheduled for 10/6/23 total thyroid completion surgery- no chemo / or radiation for thyroid cancer    [place] teeth extracted      Past Surgical History:   Procedure Laterality Date    D & C  10/26/2012    ENDOMETRIAL ABLATION  10/26/2012    d&c followed by novasure ablation    HYSTEROSCOPY      MIRENA, IUD  09/25/2012    OTHER SURGICAL HISTORY      Botox surgery for fissure 3/2023    REMOVAL GALLBLADDER  01/01/2005    THYROIDECTOMY      partial / left thyroid 8/2023       The family history and social history have been reviewed by me today.    Family History   Problem Relation Age of Onset    Thyroid Cancer Self     Other (adrenal cancer) Mother     Other (abdominal cancer) Father         pancreatic bladder & liver    Cancer Maternal Grandfather     Heart Disease Paternal Grandmother     Stroke Paternal Grandfather      Social History     Socioeconomic History    Marital status:    Occupational History    Occupation: Homemaker    Tobacco Use    Smoking status: Never    Smokeless tobacco: Never   Vaping Use    Vaping Use: Never used   Substance and Sexual Activity    Alcohol use: No     Alcohol/week: 0.0 standard drinks of alcohol    Drug use: No    Sexual activity: Not Currently   Other Topics Concern    Caffeine Concern No    Exercise Yes     Comment: walks every day, plays sports    Seat Belt Yes    Self-Exams Yes     Comment: self breast exam 2 times a month        Current Outpatient Medications:     lisinopril 40 MG Oral Tab, Take 1 tablet (40 mg total) by mouth daily., Disp: 90 tablet, Rfl: 0    levothyroxine 100 MCG Oral Tab, Take 1.25 tablets (125 mcg total)  by mouth daily., Disp: , Rfl:     calcium carbonate 500 MG Oral Tab, Take 2 tablets (1,000 mg total) by mouth 2 (two) times daily with meals., Disp: 120 tablet, Rfl: 0    Magnesium Cl-Calcium Carbonate (SLOW-MAG) 71.5-119 MG Oral Tab EC, Take 2 tablets by mouth daily., Disp: 60 tablet, Rfl: 0    psyllium 28 % Oral Powd Pack, Take 1 packet by mouth daily., Disp: , Rfl:     Lidocaine HCl 1 % External Gel, Apply topically as needed. Nifedipine 0.3% - lidocaine 1.5% Ointment - Use externally 2-3 times daily as needed., Disp: , Rfl:     docusate sodium 100 MG Oral Cap, Take 1 capsule (100 mg total) by mouth daily., Disp: , Rfl:     acidophilus-pectin Oral Cap, Take 1 capsule by mouth daily. W/b12, Disp: , Rfl:     Metoprolol Succinate ER 25 MG Oral Tablet 24 Hr, Take 1 tablet (25 mg total) by mouth daily., Disp: 90 tablet, Rfl: 0    Aspirin 81 MG Oral Cap, Take 81 mg by mouth daily with lunch., Disp: 1 tablet, Rfl: 0    Imatinib Mesylate 100 MG Oral Tab, Take 3 tablets (300 mg total) by mouth in the morning and 3 tablets (300 mg total) before bedtime., Disp: , Rfl:     Multiple Vitamins-Minerals (CENTRUM OR), Take 1 capsule by mouth daily as needed., Disp: , Rfl:      Review of Systems  A 10 point review of systems was performed and negative unless otherwise documented per HPI.     Physical Findings   Pulse 76   Temp 98.1 °F (36.7 °C)   Physical Exam  Vitals and nursing note reviewed. Exam conducted with a chaperone present.   Constitutional:       General: She is not in acute distress.  HENT:      Head: Normocephalic and atraumatic.      Mouth/Throat:      Mouth: Mucous membranes are moist.   Cardiovascular:      Rate and Rhythm: Normal rate and regular rhythm.   Pulmonary:      Effort: Pulmonary effort is normal.   Abdominal:      General: There is no distension.      Palpations: Abdomen is soft.      Tenderness: There is no abdominal tenderness.   Genitourinary:     Comments: Patient examined in the prone jackknife  position with female medical assistant chaperone present.  External exam of the anus reveals a chronic appearing posterior midline fissure with exposed sphincter fibers at the base.  Gentle digital rectal exam with fifth digit confirms hypertonic sphincter tone.  Musculoskeletal:         General: No deformity.   Skin:     General: Skin is warm and dry.   Neurological:      General: No focal deficit present.      Mental Status: She is alert.   Psychiatric:         Mood and Affect: Mood normal.          Assessment   1. 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  She returns for ongoing follow-up today.  Patient has underwent treatment for thyroid cancer including 2 surgeries and adjuvant radioactive iodine therapy.  She continues to have intermittent anorectal pain.  The pain flared up before [place].  She is currently taking sitz bath's and using Calmoseptine to control pain.  She has been taking Colace lately and taking calcium supplements that have caused constipation.  She does admit to significant pain relief after the Botox procedure that lasted for around 6 months.  She denies any fecal incontinence.  She does have a history of a vaginal delivery with baby weighing 8 pounds and she believes she had an episiotomy.    External exam of the anus reveals a chronic appearing posterior midline fissure with exposed sphincter fibers at the base.  Gentle digital rectal exam with fifth digit confirms hypertonic sphincter tone.    Plan   Treatment options again discussed with patient.  We can certainly start with topical medical therapy using nifedipine/lidocaine ointment.  I did prescribe this through a compound pharmacy.  Patient has not had significant symptom relief with this in the past.  Surgical options include perianal injection of Botox versus lateral internal  sphincterotomy.  The details of both these procedures were discussed including the expected recovery time, risk, benefits and alternatives.  Patient is reluctant to consider sphincterotomy due to concern for incontinence.  She is interested in repeat perianal injection of Botox.  This has been scheduled for 3/22/2024 pending medical clearance.     No orders of the defined types were placed in this encounter.      Imaging & Referrals   None    Follow Up  No follow-ups on file.    Luan Herrera MD

## 2024-02-27 ENCOUNTER — TELEPHONE (OUTPATIENT)
Dept: FAMILY MEDICINE CLINIC | Facility: CLINIC | Age: 54
End: 2024-02-27

## 2024-02-27 DIAGNOSIS — Z01.818 PRE-OP TESTING: Primary | ICD-10-CM

## 2024-02-27 RX ORDER — CALCITRIOL 0.25 UG/1
0.25 CAPSULE, LIQUID FILLED ORAL 2 TIMES DAILY
COMMUNITY

## 2024-02-27 NOTE — TELEPHONE ENCOUNTER
Ordered CBC and CMP for surgery. Called emma and informed her of the pending labs and that an EKG would be done in the office

## 2024-02-27 NOTE — TELEPHONE ENCOUNTER
Surgeon(s):  Luan Herrera MD  Procedure: ANAL EXAMINATION UNDER ANESTHESIA, PERIANAL INJECTION OF BOTOX  Anesthesia Type: General      Surgeon: Luan Herrera MD         The following Testing and Time Line are REQUIRED PER ANESTHESIA                  EKG READ AND SIGNED WITHIN   90 days  CBC, Platelet; NO Differential within  60 days  CMP (requires 4 hour fast) within  60 days

## 2024-03-04 ENCOUNTER — LAB ENCOUNTER (OUTPATIENT)
Dept: LAB | Age: 54
End: 2024-03-04
Attending: RADIOLOGY
Payer: COMMERCIAL

## 2024-03-04 DIAGNOSIS — Z01.818 PRE-OP TESTING: ICD-10-CM

## 2024-03-04 LAB
ALBUMIN SERPL-MCNC: 4 G/DL (ref 3.4–5)
ALBUMIN/GLOB SERPL: 1.4 {RATIO} (ref 1–2)
ALP LIVER SERPL-CCNC: 58 U/L
ALT SERPL-CCNC: 19 U/L
ANION GAP SERPL CALC-SCNC: 6 MMOL/L (ref 0–18)
AST SERPL-CCNC: 27 U/L (ref 15–37)
BASOPHILS # BLD AUTO: 0.04 X10(3) UL (ref 0–0.2)
BASOPHILS NFR BLD AUTO: 0.5 %
BILIRUB SERPL-MCNC: 0.4 MG/DL (ref 0.1–2)
BUN BLD-MCNC: 17 MG/DL (ref 9–23)
CALCIUM BLD-MCNC: 9 MG/DL (ref 8.5–10.1)
CHLORIDE SERPL-SCNC: 106 MMOL/L (ref 98–112)
CO2 SERPL-SCNC: 26 MMOL/L (ref 21–32)
CREAT BLD-MCNC: 1.39 MG/DL
EGFRCR SERPLBLD CKD-EPI 2021: 45 ML/MIN/1.73M2 (ref 60–?)
EOSINOPHIL # BLD AUTO: 1.31 X10(3) UL (ref 0–0.7)
EOSINOPHIL NFR BLD AUTO: 17.3 %
ERYTHROCYTE [DISTWIDTH] IN BLOOD BY AUTOMATED COUNT: 13.2 %
FASTING STATUS PATIENT QL REPORTED: YES
GLOBULIN PLAS-MCNC: 2.8 G/DL (ref 2.8–4.4)
GLUCOSE BLD-MCNC: 106 MG/DL (ref 70–99)
HCT VFR BLD AUTO: 29.5 %
HGB BLD-MCNC: 10.2 G/DL
IMM GRANULOCYTES # BLD AUTO: 0.02 X10(3) UL (ref 0–1)
IMM GRANULOCYTES NFR BLD: 0.3 %
LYMPHOCYTES # BLD AUTO: 0.99 X10(3) UL (ref 1–4)
LYMPHOCYTES NFR BLD AUTO: 13.1 %
MCH RBC QN AUTO: 34.3 PG (ref 26–34)
MCHC RBC AUTO-ENTMCNC: 34.6 G/DL (ref 31–37)
MCV RBC AUTO: 99.3 FL
MONOCYTES # BLD AUTO: 0.52 X10(3) UL (ref 0.1–1)
MONOCYTES NFR BLD AUTO: 6.9 %
NEUTROPHILS # BLD AUTO: 4.7 X10 (3) UL (ref 1.5–7.7)
NEUTROPHILS # BLD AUTO: 4.7 X10(3) UL (ref 1.5–7.7)
NEUTROPHILS NFR BLD AUTO: 61.9 %
OSMOLALITY SERPL CALC.SUM OF ELEC: 288 MOSM/KG (ref 275–295)
PLATELET # BLD AUTO: 195 10(3)UL (ref 150–450)
POTASSIUM SERPL-SCNC: 3.4 MMOL/L (ref 3.5–5.1)
PROT SERPL-MCNC: 6.8 G/DL (ref 6.4–8.2)
RBC # BLD AUTO: 2.97 X10(6)UL
SODIUM SERPL-SCNC: 138 MMOL/L (ref 136–145)
WBC # BLD AUTO: 7.6 X10(3) UL (ref 4–11)

## 2024-03-04 PROCEDURE — 80053 COMPREHEN METABOLIC PANEL: CPT

## 2024-03-04 PROCEDURE — 36415 COLL VENOUS BLD VENIPUNCTURE: CPT

## 2024-03-04 PROCEDURE — 85025 COMPLETE CBC W/AUTO DIFF WBC: CPT

## 2024-03-07 ENCOUNTER — OFFICE VISIT (OUTPATIENT)
Dept: FAMILY MEDICINE CLINIC | Facility: CLINIC | Age: 54
End: 2024-03-07
Payer: COMMERCIAL

## 2024-03-07 VITALS
BODY MASS INDEX: 31.47 KG/M2 | HEIGHT: 62 IN | WEIGHT: 171 LBS | DIASTOLIC BLOOD PRESSURE: 70 MMHG | HEART RATE: 89 BPM | SYSTOLIC BLOOD PRESSURE: 132 MMHG | OXYGEN SATURATION: 99 %

## 2024-03-07 DIAGNOSIS — Z01.818 PRE-OP EXAM: Primary | ICD-10-CM

## 2024-03-07 DIAGNOSIS — I10 ESSENTIAL HYPERTENSION, BENIGN: ICD-10-CM

## 2024-03-07 RX ORDER — LEVOTHYROXINE SODIUM 0.12 MG/1
125 TABLET ORAL
COMMUNITY

## 2024-03-07 RX ORDER — LISINOPRIL 40 MG/1
40 TABLET ORAL DAILY
Qty: 90 TABLET | Refills: 1 | Status: CANCELLED | OUTPATIENT
Start: 2024-03-07

## 2024-03-07 RX ORDER — LISINOPRIL 40 MG/1
40 TABLET ORAL DAILY
Qty: 90 TABLET | Refills: 0 | Status: SHIPPED | OUTPATIENT
Start: 2024-03-07

## 2024-03-07 NOTE — PROGRESS NOTES
Adriana Pearson is a 53 year old female who presents for a pre-operative physical exam. Patient is to have anal examination under anesthesia, perianal injection of botox under general anesthesia by Dr. Herrera on 3/22/24.  Pt has had previous anesthesia:  Yes.  Previous complications:  No  Hx of DVT/PE:  No    Patient presents at the request of their surgeon and the notes/results will be faxed after the visit.    HPI:   Patient's history includes: HTN, Paroxysmal Afib on metoprolol, not on anticoagulation, anal fissure, Anemia, Chronic myeloid leukemia.    1. CKD Cr 1.39 and GFR 45. This is worsened from prior labs (through [facility]) which she brought with today shoeing Cr 1.26 and GFR 48. She does follow with nephrology  and Cr has been elevated. She follows with nephrology. Sees nephro Dr. Neil 3/20.    2.  CML - dx at age 33. follows with oncology who is in [place]. [place], next appt in 8/2024. Imatinib currently on board.     3. Anemia. 10.3 on recent labs in 2/2024, improved from prior in 1/2024.  She is not taking iron. Reports anemia has been going on for 20 years.    4. S/p thyroidectomy and SILVA after PET positive thyroid nodule found 5/2023, following with endocrinology.    Preop  1) High Risk Cardiac Conditions   1) Recent MI - no   2) Decompensated Heart Failure - no   3) Unstable angina - no   4) Symptomatic arrythmia -afib, Follows with [facility] and was advised to continue current plan of care. Not symptomatic.   5) Sx Valvular Disease - no    2) Intermediate Risk Factors - CKD    2) Functional Status - > 4 mets     3) Surgery Specific Risk - Low    4) Further Noninvasive evaluation    1) EKG - completed    5) Need for medical therapy - no    Current Outpatient Medications   Medication Sig Dispense Refill    levothyroxine 125 MCG Oral Tab Take 1 tablet (125 mcg total) by mouth before breakfast.      lisinopril 40 MG Oral Tab Take 1 tablet (40 mg total) by mouth daily. 90 tablet 0     calcitriol 0.25 MCG Oral Cap Take 1 capsule (0.25 mcg total) by mouth 2 (two) times daily.      NON FORMULARY Nifedipine and lidocaine ointment to aa as directed      calcium carbonate 500 MG Oral Tab Take 2 tablets (1,000 mg total) by mouth 2 (two) times daily with meals. 120 tablet 0    Magnesium Cl-Calcium Carbonate (SLOW-MAG) 71.5-119 MG Oral Tab EC Take 2 tablets by mouth daily. 60 tablet 0    docusate sodium 100 MG Oral Cap Take 1 capsule (100 mg total) by mouth daily.      Metoprolol Succinate ER 25 MG Oral Tablet 24 Hr Take 1 tablet (25 mg total) by mouth daily. 90 tablet 0    Aspirin 81 MG Oral Cap Take 81 mg by mouth daily with lunch. 1 tablet 0    Imatinib Mesylate 100 MG Oral Tab Take 3 tablets (300 mg total) by mouth in the morning and 3 tablets (300 mg total) before bedtime.      Multiple Vitamins-Minerals (CENTRUM OR) Take 1 capsule by mouth daily as needed.        Allergies:   Allergies   Allergen Reactions    Magnesium OTHER (SEE COMMENTS)     Blood pressure drops with IV magnesium during blood transfusions      Past Medical History:   Diagnosis Date    Anal fissure     Anemia     Arrhythmia     A-FIB 2020- on metoprolol    Blood disorder     luekemia    Calculus of gallbladder without mention of cholecystitis or obstruction 06/29/2012    Calculus of kidney     Chronic myeloid leukemia, without mention of having achieved remission 06/29/2012    diagnosed 2003    CML (chronic myelocytic leukemia) (HCC)     Dysfunctional uterine bleeding     Dysmenorrhea     Essential hypertension, benign     Exposure to medical diagnostic radiation 12/01/2023 12/23  oral radiation pill thyroid cancer    Gestational diabetes mellitus (HCC)     H/O mammogram 04/12/2012    BENIGN FINDINGS    High blood pressure     History of blood transfusion 2003    History of recent hospitalization     X2 days at  - had abd pain, N/V low potassium and inflammation in Kidneys    Nausea and vomiting, unspecified vomiting type  01/29/2023    Pap smear for cervical cancer screening 03/30/2012    wnl neg hpv    Peptic ulcer     Personal history of antineoplastic chemotherapy     daily oral pills currently    Renal disorder     stage 3 kidney disease    Thyroid cancer (HCC)     Surgery- in march and now scheduled for 10/6/23 total thyroid completion surgery- no chemo / or radiation for thyroid cancer    Visual impairment     readers    [place] teeth extracted       Past Surgical History:   Procedure Laterality Date    CHOLECYSTECTOMY      D & C  10/26/2012    ENDOMETRIAL ABLATION  10/26/2012    d&c followed by novasure ablation    HYSTEROSCOPY      MIRENA, IUD  09/25/2012    OTHER SURGICAL HISTORY      Botox surgery for fissure 3/2023    REMOVAL GALLBLADDER  01/01/2005    THYROIDECTOMY      partial / left thyroid 8/23/2023 and rest, right, removed 10/06/2023, and transplant of parathryroids back in      Family History   Problem Relation Age of Onset    Thyroid Cancer Self     Other (adrenal cancer) Mother     Other (abdominal cancer) Father         pancreatic bladder & liver    Cancer Maternal Grandfather     Heart Disease Paternal Grandmother     Stroke Paternal Grandfather       Social History:   Social History     Socioeconomic History    Marital status:    Occupational History    Occupation: Homemaker    Tobacco Use    Smoking status: Never    Smokeless tobacco: Never   Vaping Use    Vaping Use: Never used   Substance and Sexual Activity    Alcohol use: No     Alcohol/week: 0.0 standard drinks of alcohol    Drug use: No    Sexual activity: Not Currently   Other Topics Concern    Caffeine Concern No    Exercise Yes     Comment: walks every day, plays sports    Seat Belt Yes    Self-Exams Yes     Comment: self breast exam 2 times a month     Social Determinants of Health     Food Insecurity: No Food Insecurity (10/6/2023)    Food Insecurity     Food Insecurity: Never true   Transportation Needs: No Transportation Needs (10/6/2023)     Transportation Needs     Lack of Transportation: No   Housing Stability: Low Risk  (10/6/2023)    Housing Stability     Housing Instability: No         REVIEW OF SYSTEMS:   GENERAL: feels well otherwise  LUNGS: denies shortness of breath  CARDIOVASCULAR: denies chest pain  GI: denies abdominal pain  : denies dysuria  NEURO: denies headaches    EXAM:   /70   Pulse 89   Ht 5' 2\" (1.575 m)   Wt 171 lb (77.6 kg)   SpO2 99%   BMI 31.28 kg/m²   GENERAL: well developed, well nourished,in no apparent distress  HEENT: atraumatic, normocephalic, O/P clear  NECK: supple,no adenopathy  LUNGS: clear to auscultation  CARDIO: RRR without murmur  GI: good BS's,no masses, HSM or tenderness  EXTREMITIES: no edema  NEURO: Alert    Labs:  Hgb: reviewed  WBC: 7.6, done on 3/4/2024.  HGB: 10.2, done on 3/4/2024.  PLT: 195, done on 3/4/2024.      BMP reviewed  Glucose: 106, done on 3/4/2024.  Cr: 1.39, done on 3/4/2024.  GFR(AA): 59, done on 8/26/2020.  GFR (non-AA): 52, done on 8/26/2020.  CA: 9, done on 3/4/2024.  Na: 138, done on 3/4/2024.  K: 3.4, done on 3/4/2024.  Cl: 106, done on 3/4/2024.  CO2: 26, done on 3/4/2024.  Last ALB was 4% done on 3/4/2024.        ASSESSMENT AND PLAN:   Adriana Pearson is a 53 year old female who presents for a pre-operative physical exam.   1. Essential hypertension, benign - needs refill  - lisinopril 40 MG Oral Tab; Take 1 tablet (40 mg total) by mouth daily.  Dispense: 90 tablet; Refill: 0    2. Pre-op exam -  I have independently evaluated patient.  Patient is a 53yoF  who is intermediate risk for a low or intermediate risk surgery due to history of afib and ckd.  There are no modifiable risk factors (smoking, etc).  Her cr was slightly bumped on recent labs and she will repeat a BMP prior to surgery to ensure stability or improvement.   - EKG with interpretation and Report -IN OFFICE [35115]

## 2024-03-08 ENCOUNTER — TELEPHONE (OUTPATIENT)
Facility: LOCATION | Age: 54
End: 2024-03-08

## 2024-03-08 LAB
ATRIAL RATE: 73 BPM
P AXIS: 44 DEGREES
P-R INTERVAL: 144 MS
Q-T INTERVAL: 398 MS
QRS DURATION: 88 MS
QTC CALCULATION (BEZET): 438 MS
R AXIS: 32 DEGREES
T AXIS: 14 DEGREES
VENTRICULAR RATE: 73 BPM

## 2024-03-08 NOTE — TELEPHONE ENCOUNTER
DARWIN FAITH Patient  Member ID  [id number]    Date of Birth  1970-07-09    Gender  Female    Relationship to Subscriber  Spouse    Subscriber Name  CELSO FAITH    Transaction Type  Outpatient Authorization    Organization  [facility]    Payer  [facility] logo     Certificate Information  Reference Number  [id number]    Status  NO ACTION REQUIRED    Message  Requested Service does not require preauthorization. We would strongly encourage you to check benefits for this service.    Member Information  Patient Name  DARWIN FAITH    Patient Date of Birth  1970-07-09    Patient Gender  Female    Member ID  [id number]    Relationship to Subscriber  Spouse    Subscriber Name  CELSO FAITH    Requesting Provider     Name  GABRIELA CLARK    NPI  [id number]    Tax Id  [id number]    Specialty  [id number]  Provider Role  Provider    Address  [address] [place] [address]    Phone  [phone]  Fax  [phone]    Contact Name  GEOVANI LEONARD    Service Information  Service Type  2 - Surgical    Place of Service  22 - On [facility]    Service From - To Date  2024-03-22 - 2024-06-26    Level of Service  Elective    Diagnosis Code 1  K602 - Anal fissure unspecified    Procedure Code 1 (CPT/HCPCS)  13105 - SURG DX EXAM ANORECTAL    Quantity  1 Units    Status  NO ACTION REQUIRED    Procedure Code 2 (CPT/HCPCS)  24438 - CHEMODENERVATION ANAL MUSC    Quantity  1 Units    Status  NO ACTION REQUIRED    Rendering Provider/Facility     Provider 1  Name  AMBER GABRIELA    NPI  [id number]    Specialty  [id number]  Provider Role  Attending    Address  [address] [place] [address]    Provider 2  Name  [facility]    NPI  [id number]    Provider Role  Facility    Address  [address] [place] [address]

## 2024-03-15 ENCOUNTER — TELEPHONE (OUTPATIENT)
Facility: LOCATION | Age: 54
End: 2024-03-15

## 2024-03-15 NOTE — TELEPHONE ENCOUNTER
Prior authorization approved for cpt code . Ref #:[id number]    Start date: 3/22/24  End date: 9/22/24

## 2024-03-20 ENCOUNTER — TELEPHONE (OUTPATIENT)
Facility: LOCATION | Age: 54
End: 2024-03-20

## 2024-03-20 NOTE — TELEPHONE ENCOUNTER
Nephrology Clearance received from Dr CHARLEEN Dang.  Per note \"She has chronic kidney disease stage 3a, therefore does have increased risk for acute kidney injury with any surgical procedure...\" Faxed to [facility]readmission testing and placed in binder.

## 2024-03-22 ENCOUNTER — HOSPITAL ENCOUNTER (OUTPATIENT)
Facility: HOSPITAL | Age: 54
Setting detail: HOSPITAL OUTPATIENT SURGERY
Discharge: HOME | End: 2024-03-22
Attending: STUDENT IN AN ORGANIZED HEALTH CARE EDUCATION/TRAINING PROGRAM | Admitting: STUDENT IN AN ORGANIZED HEALTH CARE EDUCATION/TRAINING PROGRAM
Payer: COMMERCIAL

## 2024-03-22 ENCOUNTER — ANESTHESIA (OUTPATIENT)
Dept: SURGERY | Facility: HOSPITAL | Age: 54
End: 2024-03-22
Payer: COMMERCIAL

## 2024-03-22 ENCOUNTER — ANESTHESIA EVENT (OUTPATIENT)
Dept: SURGERY | Facility: HOSPITAL | Age: 54
End: 2024-03-22
Payer: COMMERCIAL

## 2024-03-22 VITALS
HEART RATE: 75 BPM | DIASTOLIC BLOOD PRESSURE: 70 MMHG | HEIGHT: 62 IN | TEMPERATURE: 97 F | RESPIRATION RATE: 16 BRPM | BODY MASS INDEX: 30.66 KG/M2 | OXYGEN SATURATION: 97 % | WEIGHT: 166.63 LBS | SYSTOLIC BLOOD PRESSURE: 143 MMHG

## 2024-03-22 DIAGNOSIS — K60.2 ANAL FISSURE: Primary | ICD-10-CM

## 2024-03-22 LAB
B-HCG UR QL: NEGATIVE
GLUCOSE BLD-MCNC: 115 MG/DL (ref 70–99)

## 2024-03-22 PROCEDURE — 3E023GC INTRODUCTION OF OTHER THERAPEUTIC SUBSTANCE INTO MUSCLE, PERCUTANEOUS APPROACH: ICD-10-PCS | Performed by: STUDENT IN AN ORGANIZED HEALTH CARE EDUCATION/TRAINING PROGRAM

## 2024-03-22 PROCEDURE — 46505 CHEMODENERVATION ANAL MUSC: CPT | Performed by: STUDENT IN AN ORGANIZED HEALTH CARE EDUCATION/TRAINING PROGRAM

## 2024-03-22 RX ORDER — IBUPROFEN 600 MG/1
600 TABLET ORAL ONCE AS NEEDED
Status: DISCONTINUED | OUTPATIENT
Start: 2024-03-22 | End: 2024-03-22

## 2024-03-22 RX ORDER — LABETALOL HYDROCHLORIDE 5 MG/ML
5 INJECTION, SOLUTION INTRAVENOUS EVERY 5 MIN PRN
Status: DISCONTINUED | OUTPATIENT
Start: 2024-03-22 | End: 2024-03-22

## 2024-03-22 RX ORDER — SCOLOPAMINE TRANSDERMAL SYSTEM 1 MG/1
1 PATCH, EXTENDED RELEASE TRANSDERMAL ONCE
Status: DISCONTINUED | OUTPATIENT
Start: 2024-03-22 | End: 2024-03-22 | Stop reason: HOSPADM

## 2024-03-22 RX ORDER — ONDANSETRON 2 MG/ML
INJECTION INTRAMUSCULAR; INTRAVENOUS AS NEEDED
Status: DISCONTINUED | OUTPATIENT
Start: 2024-03-22 | End: 2024-03-22 | Stop reason: SURG

## 2024-03-22 RX ORDER — LIDOCAINE HYDROCHLORIDE 10 MG/ML
INJECTION, SOLUTION EPIDURAL; INFILTRATION; INTRACAUDAL; PERINEURAL AS NEEDED
Status: DISCONTINUED | OUTPATIENT
Start: 2024-03-22 | End: 2024-03-22 | Stop reason: SURG

## 2024-03-22 RX ORDER — PROCHLORPERAZINE EDISYLATE 5 MG/ML
5 INJECTION INTRAMUSCULAR; INTRAVENOUS EVERY 8 HOURS PRN
Status: DISCONTINUED | OUTPATIENT
Start: 2024-03-22 | End: 2024-03-22

## 2024-03-22 RX ORDER — NICOTINE POLACRILEX 4 MG
15 LOZENGE BUCCAL
Status: DISCONTINUED | OUTPATIENT
Start: 2024-03-22 | End: 2024-03-22

## 2024-03-22 RX ORDER — CEFAZOLIN SODIUM/WATER 2 G/20 ML
2 SYRINGE (ML) INTRAVENOUS ONCE
Status: DISCONTINUED | OUTPATIENT
Start: 2024-03-22 | End: 2024-03-22 | Stop reason: HOSPADM

## 2024-03-22 RX ORDER — HYDROMORPHONE HYDROCHLORIDE 1 MG/ML
0.2 INJECTION, SOLUTION INTRAMUSCULAR; INTRAVENOUS; SUBCUTANEOUS EVERY 5 MIN PRN
Status: DISCONTINUED | OUTPATIENT
Start: 2024-03-22 | End: 2024-03-22

## 2024-03-22 RX ORDER — BUPIVACAINE HYDROCHLORIDE AND EPINEPHRINE 5; 5 MG/ML; UG/ML
INJECTION, SOLUTION EPIDURAL; INTRACAUDAL; PERINEURAL AS NEEDED
Status: DISCONTINUED | OUTPATIENT
Start: 2024-03-22 | End: 2024-03-22 | Stop reason: HOSPADM

## 2024-03-22 RX ORDER — HYDROMORPHONE HYDROCHLORIDE 1 MG/ML
0.4 INJECTION, SOLUTION INTRAMUSCULAR; INTRAVENOUS; SUBCUTANEOUS EVERY 5 MIN PRN
Status: DISCONTINUED | OUTPATIENT
Start: 2024-03-22 | End: 2024-03-22

## 2024-03-22 RX ORDER — SODIUM CHLORIDE, SODIUM LACTATE, POTASSIUM CHLORIDE, CALCIUM CHLORIDE 600; 310; 30; 20 MG/100ML; MG/100ML; MG/100ML; MG/100ML
INJECTION, SOLUTION INTRAVENOUS CONTINUOUS
Status: DISCONTINUED | OUTPATIENT
Start: 2024-03-22 | End: 2024-03-22

## 2024-03-22 RX ORDER — NICOTINE POLACRILEX 4 MG
30 LOZENGE BUCCAL
Status: DISCONTINUED | OUTPATIENT
Start: 2024-03-22 | End: 2024-03-22

## 2024-03-22 RX ORDER — HYDROMORPHONE HYDROCHLORIDE 1 MG/ML
0.6 INJECTION, SOLUTION INTRAMUSCULAR; INTRAVENOUS; SUBCUTANEOUS EVERY 5 MIN PRN
Status: DISCONTINUED | OUTPATIENT
Start: 2024-03-22 | End: 2024-03-22

## 2024-03-22 RX ORDER — SODIUM CHLORIDE 0.9 % (FLUSH) 0.9 %
SYRINGE (ML) INJECTION AS NEEDED
Status: DISCONTINUED | OUTPATIENT
Start: 2024-03-22 | End: 2024-03-22 | Stop reason: HOSPADM

## 2024-03-22 RX ORDER — CEFAZOLIN SODIUM 1 G/3ML
INJECTION, POWDER, FOR SOLUTION INTRAMUSCULAR; INTRAVENOUS AS NEEDED
Status: DISCONTINUED | OUTPATIENT
Start: 2024-03-22 | End: 2024-03-22 | Stop reason: SURG

## 2024-03-22 RX ORDER — HYDROCODONE BITARTRATE AND ACETAMINOPHEN 5; 325 MG/1; MG/1
1 TABLET ORAL ONCE AS NEEDED
Status: DISCONTINUED | OUTPATIENT
Start: 2024-03-22 | End: 2024-03-22

## 2024-03-22 RX ORDER — ONDANSETRON 2 MG/ML
4 INJECTION INTRAMUSCULAR; INTRAVENOUS EVERY 6 HOURS PRN
Status: DISCONTINUED | OUTPATIENT
Start: 2024-03-22 | End: 2024-03-22

## 2024-03-22 RX ORDER — ACETAMINOPHEN 500 MG
1000 TABLET ORAL ONCE
Status: DISCONTINUED | OUTPATIENT
Start: 2024-03-22 | End: 2024-03-22 | Stop reason: HOSPADM

## 2024-03-22 RX ORDER — TRAMADOL HYDROCHLORIDE 50 MG/1
50 TABLET ORAL AS NEEDED
Qty: 6 TABLET | Refills: 0 | Status: SHIPPED | OUTPATIENT
Start: 2024-03-22

## 2024-03-22 RX ORDER — HYDROCODONE BITARTRATE AND ACETAMINOPHEN 5; 325 MG/1; MG/1
2 TABLET ORAL ONCE AS NEEDED
Status: DISCONTINUED | OUTPATIENT
Start: 2024-03-22 | End: 2024-03-22

## 2024-03-22 RX ORDER — MEPERIDINE HYDROCHLORIDE 25 MG/ML
12.5 INJECTION INTRAMUSCULAR; INTRAVENOUS; SUBCUTANEOUS AS NEEDED
Status: DISCONTINUED | OUTPATIENT
Start: 2024-03-22 | End: 2024-03-22

## 2024-03-22 RX ORDER — DEXAMETHASONE SODIUM PHOSPHATE 4 MG/ML
VIAL (ML) INJECTION AS NEEDED
Status: DISCONTINUED | OUTPATIENT
Start: 2024-03-22 | End: 2024-03-22 | Stop reason: SURG

## 2024-03-22 RX ORDER — MIDAZOLAM HYDROCHLORIDE 1 MG/ML
1 INJECTION INTRAMUSCULAR; INTRAVENOUS EVERY 5 MIN PRN
Status: DISCONTINUED | OUTPATIENT
Start: 2024-03-22 | End: 2024-03-22

## 2024-03-22 RX ORDER — METRONIDAZOLE 500 MG/100ML
500 INJECTION, SOLUTION INTRAVENOUS ONCE
Status: COMPLETED | OUTPATIENT
Start: 2024-03-22 | End: 2024-03-22

## 2024-03-22 RX ORDER — NALOXONE HYDROCHLORIDE 0.4 MG/ML
0.08 INJECTION, SOLUTION INTRAMUSCULAR; INTRAVENOUS; SUBCUTANEOUS AS NEEDED
Status: DISCONTINUED | OUTPATIENT
Start: 2024-03-22 | End: 2024-03-22

## 2024-03-22 RX ORDER — DEXTROSE MONOHYDRATE 25 G/50ML
50 INJECTION, SOLUTION INTRAVENOUS
Status: DISCONTINUED | OUTPATIENT
Start: 2024-03-22 | End: 2024-03-22

## 2024-03-22 RX ORDER — ACETAMINOPHEN 500 MG
1000 TABLET ORAL ONCE AS NEEDED
Status: DISCONTINUED | OUTPATIENT
Start: 2024-03-22 | End: 2024-03-22

## 2024-03-22 RX ADMIN — LIDOCAINE HYDROCHLORIDE 50 MG: 10 INJECTION, SOLUTION EPIDURAL; INFILTRATION; INTRACAUDAL; PERINEURAL at 07:06:00

## 2024-03-22 RX ADMIN — SODIUM CHLORIDE, SODIUM LACTATE, POTASSIUM CHLORIDE, CALCIUM CHLORIDE: 600; 310; 30; 20 INJECTION, SOLUTION INTRAVENOUS at 07:01:00

## 2024-03-22 RX ADMIN — ONDANSETRON 4 MG: 2 INJECTION INTRAMUSCULAR; INTRAVENOUS at 07:13:00

## 2024-03-22 RX ADMIN — DEXAMETHASONE SODIUM PHOSPHATE 8 MG: 4 MG/ML VIAL (ML) INJECTION at 07:12:00

## 2024-03-22 RX ADMIN — METRONIDAZOLE 500 MG: 500 INJECTION, SOLUTION INTRAVENOUS at 07:11:00

## 2024-03-22 RX ADMIN — SODIUM CHLORIDE, SODIUM LACTATE, POTASSIUM CHLORIDE, CALCIUM CHLORIDE: 600; 310; 30; 20 INJECTION, SOLUTION INTRAVENOUS at 07:47:00

## 2024-03-22 RX ADMIN — CEFAZOLIN SODIUM 2 G: 1 INJECTION, POWDER, FOR SOLUTION INTRAMUSCULAR; INTRAVENOUS at 07:14:00

## 2024-03-22 NOTE — ANESTHESIA POSTPROCEDURE EVALUATION
[facility]    Adriana Pearson Patient Status:  Hospital Outpatient Surgery   Age/Gender 53 year old female MRN [id number]   Location [facility] POST ANESTHESIA CARE UNIT Attending Luan Herrera MD   Hosp Day # 0 PCP Sam Deleon MD       Anesthesia Post-op Note    ANAL EXAMINATION UNDER ANESTHESIA, PERIANAL INJECTION OF BOTOX    Procedure Summary       Date: 03/22/24 Room / Location:  MAIN OR 15 / [facility] MAIN OR    Anesthesia Start: 0701 Anesthesia Stop: 0754    Procedure: ANAL EXAMINATION UNDER ANESTHESIA, PERIANAL INJECTION OF BOTOX Diagnosis:       Anal fissure      (Anal fissure [K60.2])    Surgeons: Luan Herrera MD Anesthesiologist: Julius Foster MD    Anesthesia Type: general ASA Status: 2            Anesthesia Type: No value filed.    Vitals Value Taken Time   /78 03/22/24 0752   Temp 98.8 03/22/24 0755   Pulse 99 03/22/24 0754   Resp 8 03/22/24 0754   SpO2 100 % 03/22/24 0754   Vitals shown include unfiled device data.    Patient Location: PACU    Anesthesia Type: general    Airway Patency: patent, extubated and oral/nasal airway    Postop Pain Control: adequate    Mental Status: sedated until time of extubation    Nausea/Vomiting: none    Cardiopulmonary/Hydration status: stable euvolemic    Complications: no apparent anesthesia related complications    Postop vital signs: stable    Comments: Report to PACU SANDY Nino.    Dental Exam: Unchanged from Preop    Patient to be discharged from PACU when criteria met.

## 2024-03-22 NOTE — OPERATIVE REPORT
[facility]  Operative Note    Adriana Pearson Location: OR   [facility] [id number] MRN [id number]    1970 Age 53 year old   Admission Date 3/22/2024 Operation Date 3/22/2024   Attending Physician Luan Herrera MD Operating Physician Luan Herrera MD   PCP Sam Deleon MD          Patient Name: Adriana Pearson    Preoperative Diagnosis: Anal fissure [K60.2]    Postoperative Diagnosis: Same as preoperative diagnosis    Primary Surgeon: Luan Herrera MD    Assistant: None    Anesthesia: General    Procedures: Anal exam under anesthesia with ansocopy, perianal injection of Botox    Implants: None    Specimen: None    Drains: None    Estimated Blood Loss: 2 cc    Complications: None immediate    Condition: Stable    Indications for Surgery:   This is a very nice 53-year-old female with a 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  She returns for ongoing follow-up today.  Patient has underwent treatment for thyroid cancer including 2 surgeries and adjuvant radioactive iodine therapy.  She continues to have intermittent anorectal pain.  The pain flared up before [place].  She is currently taking sitz bath's and using Calmoseptine to control pain.  She has been taking Colace lately and taking calcium supplements that have caused constipation.  She does admit to significant pain relief after the Botox procedure that lasted for around 6 months.  She denies any fecal incontinence.  She does have a history of a vaginal delivery with baby weighing 8 pounds and she believes she had an episiotomy.     External exam of the anus reveals a chronic appearing posterior midline fissure with exposed sphincter fibers at the base.  Gentle digital rectal exam with fifth digit confirms hypertonic sphincter tone.     Treatment options again discussed with patient.  We can certainly start with topical medical therapy using nifedipine/lidocaine ointment.   I did prescribe this through a compound pharmacy.  Patient has not had significant symptom relief with this in the past.  Surgical options include perianal injection of Botox versus lateral internal sphincterotomy.  The details of both these procedures were discussed including the expected recovery time, risks, benefits and alternatives.  Patient is reluctant to consider sphincterotomy due to concern for incontinence.  She is interested in repeat perianal injection of Botox.  She presents for this procedure today.  Consent was signed.  All questions answered.    Surgical Findings:   Hypertonic sphincter tone.  Chronic appearing, superficial posterior midline anal fissure.    Description of Procedure:   Patient was brought to the operating room on the transport cart. Bilateral sequential compression devices were placed. Preoperative antibiotics were given.  Patient was induced under general endotracheal anesthesia.  Patient was positioned in lithotomy with all pressure points well-padded. The anus and perineum were prepped and draped in the usual sterile fashion.  A timeout was performed.     I began with external exam of the anus, digital rectal exam and anoscopy using a Rodriguez bivalve anoscope.  These revealed the findings as described above.  I decided to proceed with perianal injection of Botox.     100 units of Botox was mixed with 6.5 cc of injectable saline.  A Rodriguez bivalve anoscope was used to place the anus under stretch and I was able to easily identify the internal anal sphincter muscle.  I injected the internal sphincter with 0.5 cc of the Botox solution at all 12 clock face positions.  The canal was irrigated and appeared hemostatic.  20 cc of 0.5% Marcaine with epinephrine was circumferentially injected around the anus for local anesthesia. The skin was cleaned and dried and a dressing of 4 x 4 gauze, tape and underwear was placed.     Patient was carefully taken out of lithotomy, awakened from  anesthesia, extubated and transferred to the postanesthesia care unit in stable condition.  All sponge, needle and instrument counts were correct at the end of the case.  I was present for the entire case.      Luan Herrera MD  3/22/2024  8:01 AM

## 2024-03-22 NOTE — ANESTHESIA PREPROCEDURE EVALUATION
PRE-OP EVALUATION    Patient Name: Adriana Pearson    Admit Diagnosis: Anal fissure [K60.2]    Pre-op Diagnosis: Anal fissure [K60.2]    ANAL EXAMINATION UNDER ANESTHESIA, PERIANAL INJECTION OF BOTOX    Anesthesia Procedure: ANAL EXAMINATION UNDER ANESTHESIA, PERIANAL INJECTION OF BOTOX    Surgeon(s) and Role:     * Luan Herrera MD - Primary    Pre-op vitals reviewed.  Temp: 98.8 °F (37.1 °C)  Pulse: 78  Resp: 16  BP: 149/79  SpO2: 98 %  Body mass index is 30.47 kg/m².    Current medications reviewed.  Hospital Medications:   acetaminophen (Tylenol Extra Strength) tab 1,000 mg  1,000 mg Oral Once    scopolamine (Transderm-Scop) 1 MG/3DAYS patch 1 patch  1 patch Transdermal Once    lactated ringers infusion   Intravenous Continuous    ceFAZolin (Ancef) 2 g in 20mL IV syringe premix  2 g Intravenous Once    And    metRONIDAZOLE in sodium chloride 0.79% (Flagyl) 5 mg/mL IVPB premix 500 mg  500 mg Intravenous Once       Outpatient Medications:     Medications Prior to Admission   Medication Sig Dispense Refill Last Dose    levothyroxine 125 MCG Oral Tab Take 1 tablet (125 mcg total) by mouth before breakfast.   3/21/2024 at 0700    calcitriol 0.25 MCG Oral Cap Take 1 capsule (0.25 mcg total) by mouth 2 (two) times daily.   3/20/2024    NON FORMULARY Nifedipine and lidocaine ointment to aa as directed   Past Month    calcium carbonate 500 MG Oral Tab Take 2 tablets (1,000 mg total) by mouth 2 (two) times daily with meals. 120 tablet 0     Magnesium Cl-Calcium Carbonate (SLOW-MAG) 71.5-119 MG Oral Tab EC Take 2 tablets by mouth daily. 60 tablet 0 3/20/2024    docusate sodium 100 MG Oral Cap Take 1 capsule (100 mg total) by mouth daily.   3/20/2024    Metoprolol Succinate ER 25 MG Oral Tablet 24 Hr Take 1 tablet (25 mg total) by mouth daily. 90 tablet 0 3/22/2024 at 0230    Aspirin 81 MG Oral Cap Take 81 mg by mouth daily with lunch. 1 tablet 0 3/20/2024    Imatinib Mesylate 100 MG Oral Tab Take 3 tablets (300 mg  total) by mouth in the morning and 3 tablets (300 mg total) before bedtime.   3/21/2024 at 1800    Multiple Vitamins-Minerals (CENTRUM OR) Take 1 capsule by mouth daily as needed.   Past Month    lisinopril 40 MG Oral Tab Take 1 tablet (40 mg total) by mouth daily. 90 tablet 0 3/20/2024       Allergies: Magnesium      Anesthesia Evaluation    Patient summary reviewed.    Anesthetic Complications  (-) history of anesthetic complications         GI/Hepatic/Renal           (+) PUD                      Cardiovascular      ECG reviewed.  Exercise tolerance: good     MET: >4      (+) hypertension                  (+) dysrhythmias (intermittent) and atrial fibrillation                  Endo/Other      (+) diabetes     (+) hypothyroidism                       Pulmonary    Negative pulmonary ROS.                       Neuro/Psych    Negative neuro/psych ROS.                                  Past Surgical History:   Procedure Laterality Date    CHOLECYSTECTOMY      D & C  10/26/2012    ENDOMETRIAL ABLATION  10/26/2012    d&c followed by novasure ablation    HYSTEROSCOPY      MIRENA, IUD  09/25/2012    OTHER SURGICAL HISTORY      Botox surgery for fissure 3/2023    REMOVAL GALLBLADDER  01/01/2005    THYROIDECTOMY      partial / left thyroid 8/23/2023 and rest, right, removed 10/06/2023, and transplant of parathryroids back in     Social History     Socioeconomic History    Marital status:    Occupational History    Occupation: Homemaker    Tobacco Use    Smoking status: Never    Smokeless tobacco: Never   Vaping Use    Vaping Use: Never used   Substance and Sexual Activity    Alcohol use: No     Alcohol/week: 0.0 standard drinks of alcohol    Drug use: No    Sexual activity: Not Currently   Other Topics Concern    Caffeine Concern No    Exercise Yes     Comment: walks every day, plays sports    Seat Belt Yes    Self-Exams Yes     Comment: self breast exam 2 times a month     History   Drug Use No     Available pre-op  labs reviewed.  Lab Results   Component Value Date    WBC 7.6 03/04/2024    RBC 2.97 (L) 03/04/2024    HGB 10.2 (L) 03/04/2024    HCT 29.5 (L) 03/04/2024    MCV 99.3 03/04/2024    MCH 34.3 (H) 03/04/2024    MCHC 34.6 03/04/2024    RDW 13.2 03/04/2024    .0 03/04/2024     Lab Results   Component Value Date     03/04/2024    K 3.4 (L) 03/04/2024     03/04/2024    CO2 26.0 03/04/2024    BUN 17 03/04/2024    CREATSERUM 1.39 (H) 03/04/2024     (H) 03/04/2024    CA 9.0 03/04/2024            Airway      Mallampati: II  Mouth opening: 3 FB  TM distance: > 6 cm  Neck ROM: full Cardiovascular    Cardiovascular exam normal.  Rhythm: regular  Rate: normal  (-) murmur   Dental    Dentition appears grossly intact         Pulmonary    Pulmonary exam normal.  Breath sounds clear to auscultation bilaterally.               Other findings          ASA: 2   Plan: general  NPO status verified and patient meets guidelines.  Patient has taken beta blockers in last 24 hours.      Comment: GETA discussed in detail.  Risk of sore throat, cough, dental trauma, PONV discussed.  Patient expresses understanding and wishes to proceed. All questions answered.    Plan/risks discussed with: patient            Present on Admission:  **None**

## 2024-03-22 NOTE — ANESTHESIA PROCEDURE NOTES
Airway  Date/Time: 3/22/2024 7:08 AM  Urgency: elective    Airway not difficult    General Information and Staff    Patient location during procedure: OR  Anesthesiologist: Julius Foster MD  Resident/CRNA: Tenisha Otoole CRNA  Performed: CRNA   Performed by: Tenisha Otoole CRNA  Authorized by: Julius Foster MD      Indications and Patient Condition  Indications for airway management: anesthesia  Sedation level: deep  Preoxygenated: yes  Patient position: sniffing  Mask difficulty assessment: 0 - not attempted    Final Airway Details  Final airway type: supraglottic airway      Successful airway: classic  Size 4       Number of attempts at approach: 1    Additional Comments  Insertion with ease. Atraumatic placement. Dentition and OP as per preop.

## 2024-03-22 NOTE — H&P
Follow Up Visit Note       Active Problems      No diagnosis found.        Chief Complaint   No chief complaint on file.      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  She returns for ongoing follow-up today.  Patient has underwent treatment for thyroid cancer including 2 surgeries and adjuvant radioactive iodine therapy.  She continues to have intermittent anorectal pain.  The pain flared up before [place].  She is currently taking sitz bath's and using Calmoseptine to control pain.  She has been taking Colace lately and taking calcium supplements that have caused constipation.  She does admit to significant pain relief after the Botox procedure that lasted for around 6 months.  She denies any fecal incontinence.  She does have a history of a vaginal delivery with baby weighing 8 pounds and she believes she had an episiotomy.    Allergies  Adriana is allergic to magnesium.    Past Medical / Surgical / Social / Family History    The past medical and past surgical history have been reviewed by me today.    Past Medical History:   Diagnosis Date    Anal fissure     Anemia     Arrhythmia     A-FIB 2020- on metoprolol    Blood disorder     luekemia    Calculus of gallbladder without mention of cholecystitis or obstruction 06/29/2012    Calculus of kidney     Chronic myeloid leukemia, without mention of having achieved remission 06/29/2012    diagnosed 2003    CML (chronic myelocytic leukemia) (HCC)     Dysfunctional uterine bleeding     Dysmenorrhea     Essential hypertension, benign     Exposure to medical diagnostic radiation 12/01/2023 12/23  oral radiation pill thyroid cancer    Gestational diabetes mellitus (HCC)     H/O mammogram 04/12/2012    BENIGN FINDINGS    High blood pressure     History of blood transfusion 2003    History of recent hospitalization      X2 days at  - had abd pain, N/V low potassium and inflammation in Kidneys    Nausea and vomiting, unspecified vomiting type 01/29/2023    Pap smear for cervical cancer screening 03/30/2012    wnl neg hpv    Peptic ulcer     Personal history of antineoplastic chemotherapy     daily oral pills currently    Renal disorder     stage 3 kidney disease    Thyroid cancer (HCC)     Surgery- in march and now scheduled for 10/6/23 total thyroid completion surgery- no chemo / or radiation for thyroid cancer    Visual impairment     readers    [place] teeth extracted      Past Surgical History:   Procedure Laterality Date    CHOLECYSTECTOMY      D & C  10/26/2012    ENDOMETRIAL ABLATION  10/26/2012    d&c followed by novasure ablation    HYSTEROSCOPY      MIRENA, IUD  09/25/2012    OTHER SURGICAL HISTORY      Botox surgery for fissure 3/2023    REMOVAL GALLBLADDER  01/01/2005    THYROIDECTOMY      partial / left thyroid 8/23/2023 and rest, right, removed 10/06/2023, and transplant of parathryroids back in       The family history and social history have been reviewed by me today.    Family History   Problem Relation Age of Onset    Thyroid Cancer Self     Other (adrenal cancer) Mother     Other (abdominal cancer) Father         pancreatic bladder & liver    Cancer Maternal Grandfather     Heart Disease Paternal Grandmother     Stroke Paternal Grandfather      Social History     Socioeconomic History    Marital status:    Occupational History    Occupation: Homemaker    Tobacco Use    Smoking status: Never    Smokeless tobacco: Never   Vaping Use    Vaping Use: Never used   Substance and Sexual Activity    Alcohol use: No     Alcohol/week: 0.0 standard drinks of alcohol    Drug use: No    Sexual activity: Not Currently   Other Topics Concern    Caffeine Concern No    Exercise Yes     Comment: walks every day, plays sports    Seat Belt Yes    Self-Exams Yes     Comment: self breast exam 2 times a month      No current  outpatient medications on file.     Review of Systems  A 10 point review of systems was performed and negative unless otherwise documented per HPI.     Physical Findings   /79 (BP Location: Right arm)   Pulse 78   Temp 98.8 °F (37.1 °C) (Temporal)   Resp 16   Ht 62\"   Wt 166 lb 9.6 oz (75.6 kg)   SpO2 98%   BMI 30.47 kg/m²   Physical Exam  Vitals and nursing note reviewed. Exam conducted with a chaperone present.   Constitutional:       General: She is not in acute distress.  HENT:      Head: Normocephalic and atraumatic.      Mouth/Throat:      Mouth: Mucous membranes are moist.   Cardiovascular:      Rate and Rhythm: Normal rate and regular rhythm.   Pulmonary:      Effort: Pulmonary effort is normal.   Abdominal:      General: There is no distension.      Palpations: Abdomen is soft.      Tenderness: There is no abdominal tenderness.   Genitourinary:     Comments: Patient examined in the prone jackknife position with female medical assistant chaperone present.  External exam of the anus reveals a chronic appearing posterior midline fissure with exposed sphincter fibers at the base.  Gentle digital rectal exam with fifth digit confirms hypertonic sphincter tone.  Musculoskeletal:         General: No deformity.   Skin:     General: Skin is warm and dry.   Neurological:      General: No focal deficit present.      Mental Status: She is alert.   Psychiatric:         Mood and Affect: Mood normal.          Assessment   No diagnosis found.    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  She returns for ongoing follow-up today.  Patient has underwent treatment for thyroid cancer including 2 surgeries and adjuvant radioactive iodine therapy.  She continues to have intermittent anorectal pain.  The pain flared up before [place].  She is currently taking sitz bath's and using Calmoseptine  to control pain.  She has been taking Colace lately and taking calcium supplements that have caused constipation.  She does admit to significant pain relief after the Botox procedure that lasted for around 6 months.  She denies any fecal incontinence.  She does have a history of a vaginal delivery with baby weighing 8 pounds and she believes she had an episiotomy.    External exam of the anus reveals a chronic appearing posterior midline fissure with exposed sphincter fibers at the base.  Gentle digital rectal exam with fifth digit confirms hypertonic sphincter tone.    Plan   Treatment options again discussed with patient.  We can certainly start with topical medical therapy using nifedipine/lidocaine ointment.  I did prescribe this through a compound pharmacy.  Patient has not had significant symptom relief with this in the past.  Surgical options include perianal injection of Botox versus lateral internal sphincterotomy.  The details of both these procedures were discussed including the expected recovery time, risks, benefits and alternatives.  Patient is reluctant to consider sphincterotomy due to concern for incontinence.  She is interested in repeat perianal injection of Botox.  This has been scheduled for 3/22/2024 pending medical clearance.     No orders of the defined types were placed in this encounter.      Imaging & Referrals   VITAL SIGNS  NURSING COMMUNICATION  POCT PREGNANCY URINE  PLACE PIV  ACTIVITY AS TOLERATED  HEIGHT AND WEIGHT  INITIATE ADULT PREOP PROPHYLACTIC ABX PROTOCOL  VERIFY INFORMED CONSENT  NPO  VITAL SIGNS - NOTIFY PHYSICIAN    Follow Up  No follow-ups on file.    Luan Herrera MD

## 2024-03-22 NOTE — DISCHARGE INSTRUCTIONS
Anal Surgery  Post-Surgical Instructions     Luan Herrera MD         MEDICATIONS  You will be given a prescription for pain.  Take 1 tablet every 6 hours as needed.  Pain medications will ease your pain, but you should expect some incisional pain for about 7-10 days.  You may take acetaminophen (Tylenol) up to 650 mg every 6 hours as needed for mild-moderate pain. You may take ibuprofen (Motrin) up to 600 mg every 6 hours as needed for mild-moderate pain. Take narcotic pain medication as needed for severe pain per your prescription. Do not drive while taking narcotic pain medication. Many patients take a stool softener as narcotics are known to cause constipation. You should walk often, cough and take deep breaths.       DIET  You will begin a high fiber diet.  The easiest way to a high fiber diet is to take a fiber supplement.  An excellent supplement is plain, unflavored Metamucil.  You should take one tablespoon in 8 oz of water twice a day.  Ideally, you should take the supplement before breakfast and dinner.  You may experience some gas bloating for the first 2 weeks. This is normal and will go away as long as you keep taking the supplement.  Other supplements that can be taken include Per Nya, Benefiber, Konsyl, or Citrucel. Continue to take the fiber supplement for 1 month.     In addition, it is a good idea go to the drugstore and a stool softener, such as docusate, and a gentle laxative such as MiraLAX.  Taking MiraLAX daily and stool softener 1-2 times a day may prevent you from getting overly constipated while on the narcotic drug.  Once you stop taking the prescription pain medication, you may stop taking the stool softener and laxative.        WOUND CARE   You will perform sitz baths two-three times a day for 3-4 weeks.  Sitz baths simply mean soaking your anus in a tub of warm-hot water for about 15 minutes.  Sitz baths will clean the anal wound as well as relax the anal sphincter muscles, which  will help minimize your pain.  Be careful around the anal wound, especially if you have stitches on the outside.  Gently pat your anus dry (never rub) or simply dry your anus with a blow-dryer set to cool/warm.   Do not soak your anus for more than 15 minutes.        ACTIVITY  After surgery, your driving reflexes will be slower, especially if you are taking pain medications.  Therefore, you are restricted from driving until after your follow-up visit and after you have stopped taking your prescription pain meds.  You may walk about the house, go shopping, or eat at a restaurant.  You may also climb stairs and excerise but no weight lifting.  You can sit on your wound, but keep in mind that the less you sit, the less pain you will have.     APPOINTMENT  Please call our office for an appointment in 2-4 weeks after surgery, unless otherwise instructed.  This will allow ample time for the swelling and soreness to resolve before your wound is examined.  There may be some bleeding from your wound.  This is normal.  If you begin bleeding heavily, have fevers, chills, or if you are concerned about your wound, please call us immediately at [phone].     Thank you for entrusting us with your care.

## 2024-04-01 DIAGNOSIS — C92.11 BCR/ABL1-POSITIVE CHRONIC MYELOID LEUKEMIA (CML) IN REMISSION (CMD): ICD-10-CM

## 2024-04-01 RX ORDER — IMATINIB MESYLATE 100 MG/1
TABLET, FILM COATED ORAL
Qty: 180 TABLET | Refills: 6 | Status: SHIPPED | OUTPATIENT
Start: 2024-04-01

## 2024-04-11 ENCOUNTER — OFFICE VISIT (OUTPATIENT)
Facility: LOCATION | Age: 54
End: 2024-04-11
Payer: COMMERCIAL

## 2024-04-11 VITALS — TEMPERATURE: 98 F | HEART RATE: 90 BPM

## 2024-04-11 DIAGNOSIS — K60.2 ANAL FISSURE: Primary | ICD-10-CM

## 2024-04-11 PROCEDURE — 99024 POSTOP FOLLOW-UP VISIT: CPT | Performed by: STUDENT IN AN ORGANIZED HEALTH CARE EDUCATION/TRAINING PROGRAM

## 2024-04-11 NOTE — PROGRESS NOTES
Follow Up Visit Note       Active Problems      1. 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  She did well for about 6 months after this initial procedure but had return of more severe anorectal pain recently.  Examination revealed a persistent/recurrent posterior midline anal fissure.  Most recently, she underwent repeat perianal injection of Botox on 3/22/2024.  She returns for follow-up today.    Patient is doing well at this time.  She is not having any anorectal pain but does complain of pressure with prolonged sitting.  She denies any rectal bleeding or incontinence.    Allergies  Adriana is allergic to magnesium.    Past Medical / Surgical / Social / Family History    The past medical and past surgical history have been reviewed by me today.    Past Medical History:    Anal fissure    Anemia    Arrhythmia    A-FIB 2020- on metoprolol    Blood disorder    luekemia    Calculus of gallbladder without mention of cholecystitis or obstruction    Calculus of kidney    Chronic myeloid leukemia, without mention of having achieved remission    diagnosed 2003    CML (chronic myelocytic leukemia) (HCC)    Dysfunctional uterine bleeding    Dysmenorrhea    Essential hypertension, benign    Exposure to medical diagnostic radiation    12/23  oral radiation pill thyroid cancer    Gestational diabetes mellitus (HCC)    H/O mammogram    BENIGN FINDINGS    High blood pressure    History of blood transfusion    History of recent hospitalization    X2 days at  - had abd pain, N/V low potassium and inflammation in Kidneys    Nausea and vomiting, unspecified vomiting type    Pap smear for  cervical cancer screening    wnl neg hpv    Peptic ulcer    Personal history of antineoplastic chemotherapy    daily oral pills currently    Renal disorder    stage 3 kidney disease    Thyroid cancer (HCC)    Surgery- in march and now scheduled for 10/6/23 total thyroid completion surgery- no chemo / or radiation for thyroid cancer    Visual impairment    readers    [place] teeth extracted     Past Surgical History:   Procedure Laterality Date    Cholecystectomy      D & c  10/26/2012    Endometrial ablation  10/26/2012    d&c followed by novasure ablation    Hysteroscopy      Mirena, iud  09/25/2012    Other surgical history      Botox surgery for fissure 3/2023    Removal gallbladder  01/01/2005    Thyroidectomy      partial / left thyroid 8/23/2023 and rest, right, removed 10/06/2023, and transplant of parathryroids back in       The family history and social history have been reviewed by me today.    Family History   Problem Relation Age of Onset    Thyroid Cancer Self     Other (adrenal cancer) Mother     Other (abdominal cancer) Father         pancreatic bladder & liver    Cancer Maternal Grandfather     Heart Disease Paternal Grandmother     Stroke Paternal Grandfather      Social History     Socioeconomic History    Marital status:    Occupational History    Occupation: Homemaker    Tobacco Use    Smoking status: Never    Smokeless tobacco: Never   Vaping Use    Vaping status: Never Used   Substance and Sexual Activity    Alcohol use: No     Alcohol/week: 0.0 standard drinks of alcohol    Drug use: No    Sexual activity: Not Currently   Other Topics Concern    Caffeine Concern No    Exercise Yes     Comment: walks every day, plays sports    Seat Belt Yes    Self-Exams Yes     Comment: self breast exam 2 times a month        Current Outpatient Medications:     traMADol 50 MG Oral Tab, Take 1 tablet (50 mg total) by mouth as needed for Pain. 1 tablet by mouth every 4-6 hours as needed for pain., Disp: 6  tablet, Rfl: 0    levothyroxine 125 MCG Oral Tab, Take 1 tablet (125 mcg total) by mouth before breakfast., Disp: , Rfl:     lisinopril 40 MG Oral Tab, Take 1 tablet (40 mg total) by mouth daily., Disp: 90 tablet, Rfl: 0    calcitriol 0.25 MCG Oral Cap, Take 1 capsule (0.25 mcg total) by mouth 2 (two) times daily., Disp: , Rfl:     NON FORMULARY, Nifedipine and lidocaine ointment to aa as directed, Disp: , Rfl:     calcium carbonate 500 MG Oral Tab, Take 2 tablets (1,000 mg total) by mouth 2 (two) times daily with meals., Disp: 120 tablet, Rfl: 0    Magnesium Cl-Calcium Carbonate (SLOW-MAG) 71.5-119 MG Oral Tab EC, Take 2 tablets by mouth daily., Disp: 60 tablet, Rfl: 0    docusate sodium 100 MG Oral Cap, Take 1 capsule (100 mg total) by mouth daily., Disp: , Rfl:     Metoprolol Succinate ER 25 MG Oral Tablet 24 Hr, Take 1 tablet (25 mg total) by mouth daily., Disp: 90 tablet, Rfl: 0    Aspirin 81 MG Oral Cap, Take 81 mg by mouth daily with lunch., Disp: 1 tablet, Rfl: 0    Imatinib Mesylate 100 MG Oral Tab, Take 3 tablets (300 mg total) by mouth in the morning and 3 tablets (300 mg total) before bedtime., Disp: , Rfl:     Multiple Vitamins-Minerals (CENTRUM OR), Take 1 capsule by mouth daily as needed., Disp: , Rfl:      Review of Systems  A 10 point review of systems was performed and negative unless otherwise documented per HPI.     Physical Findings   Pulse 90   Temp 98 °F (36.7 °C) (Temporal)   Physical Exam  Vitals and nursing note reviewed. Exam conducted with a chaperone present.   Constitutional:       General: She is not in acute distress.  HENT:      Head: Normocephalic and atraumatic.      Mouth/Throat:      Mouth: Mucous membranes are moist.   Cardiovascular:      Rate and Rhythm: Normal rate and regular rhythm.   Pulmonary:      Effort: Pulmonary effort is normal.   Abdominal:      General: There is no distension.      Palpations: Abdomen is soft.      Tenderness: There is no abdominal tenderness.    Genitourinary:     Comments: Anorectal examination deferred today  Musculoskeletal:         General: No deformity.   Skin:     General: Skin is warm and dry.   Neurological:      General: No focal deficit present.      Mental Status: She is alert.   Psychiatric:         Mood and Affect: Mood normal.          Assessment   1. 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  She did well for about 6 months after this initial procedure but had return of more severe anorectal pain recently.  Examination revealed a persistent/recurrent posterior midline anal fissure.  Most recently, she underwent repeat perianal injection of Botox on 3/22/2024.  She returns for follow-up today.    Patient is doing well at this time.  She is not having any anorectal pain but does complain of pressure with prolonged sitting.  She denies any rectal bleeding or incontinence.    Plan   Continue bowel regimen as needed to prevent straining and constipation.  I would like to see the patient back in 2 months time for ongoing follow-up and repeat anorectal examination.  Return precautions in the meantime discussed.  Patient expressed understanding and was agreeable to plan.     No orders of the defined types were placed in this encounter.      Imaging & Referrals   None    Follow Up  No follow-ups on file.    Luan Herrera MD

## 2024-04-17 DIAGNOSIS — C92.11 BCR/ABL1-POSITIVE CHRONIC MYELOID LEUKEMIA (CML) IN REMISSION (CMD): ICD-10-CM

## 2024-04-18 RX ORDER — IMATINIB MESYLATE 100 MG/1
TABLET, FILM COATED ORAL
Qty: 180 TABLET | Refills: 6 | Status: SHIPPED | OUTPATIENT
Start: 2024-04-18

## 2024-05-16 ENCOUNTER — APPOINTMENT (OUTPATIENT)
Dept: LAB | Age: 54
End: 2024-05-16
Attending: INTERNAL MEDICINE

## 2024-05-16 ENCOUNTER — APPOINTMENT (OUTPATIENT)
Dept: HEMATOLOGY/ONCOLOGY | Age: 54
End: 2024-05-16
Attending: INTERNAL MEDICINE

## 2024-05-30 ENCOUNTER — OFFICE VISIT (OUTPATIENT)
Dept: HEMATOLOGY/ONCOLOGY | Age: 54
End: 2024-05-30
Attending: INTERNAL MEDICINE

## 2024-05-30 ENCOUNTER — LAB SERVICES (OUTPATIENT)
Dept: LAB | Age: 54
End: 2024-05-30
Attending: INTERNAL MEDICINE

## 2024-05-30 VITALS
SYSTOLIC BLOOD PRESSURE: 133 MMHG | DIASTOLIC BLOOD PRESSURE: 72 MMHG | BODY MASS INDEX: 30.12 KG/M2 | HEIGHT: 63 IN | TEMPERATURE: 97.2 F | HEART RATE: 83 BPM | RESPIRATION RATE: 16 BRPM | OXYGEN SATURATION: 100 % | WEIGHT: 170 LBS

## 2024-05-30 DIAGNOSIS — C92.11 BCR/ABL1-POSITIVE CHRONIC MYELOID LEUKEMIA (CML) IN REMISSION  (CMD): Primary | ICD-10-CM

## 2024-05-30 DIAGNOSIS — D64.9 ANEMIA, UNSPECIFIED TYPE: ICD-10-CM

## 2024-05-30 DIAGNOSIS — C92.11 BCR/ABL1-POSITIVE CHRONIC MYELOID LEUKEMIA (CML) IN REMISSION  (CMD): ICD-10-CM

## 2024-05-30 LAB
ALBUMIN SERPL-MCNC: 4 G/DL (ref 3.6–5.1)
ALBUMIN/GLOB SERPL: 1.4 {RATIO} (ref 1–2.4)
ALP SERPL-CCNC: 67 UNITS/L (ref 45–117)
ALT SERPL-CCNC: 23 UNITS/L
ANION GAP SERPL CALC-SCNC: 8 MMOL/L (ref 7–19)
AST SERPL-CCNC: 23 UNITS/L
BASOPHILS # BLD: 0.3 K/MCL (ref 0–0.3)
BASOPHILS NFR BLD: 3 %
BILIRUB SERPL-MCNC: 0.3 MG/DL (ref 0.2–1)
BUN SERPL-MCNC: 14 MG/DL (ref 6–20)
BUN/CREAT SERPL: 10 (ref 7–25)
CALCIUM SERPL-MCNC: 9.2 MG/DL (ref 8.4–10.2)
CHLORIDE SERPL-SCNC: 106 MMOL/L (ref 97–110)
CO2 SERPL-SCNC: 30 MMOL/L (ref 21–32)
CREAT SERPL-MCNC: 1.34 MG/DL (ref 0.51–0.95)
DEPRECATED RDW RBC: 48.8 FL (ref 39–50)
EGFRCR SERPLBLD CKD-EPI 2021: 47 ML/MIN/{1.73_M2}
EOSINOPHIL # BLD: 1.3 K/MCL (ref 0–0.5)
EOSINOPHIL NFR BLD: 13 %
ERYTHROCYTE [DISTWIDTH] IN BLOOD: 13.4 % (ref 11–15)
FASTING DURATION TIME PATIENT: ABNORMAL H
FERRITIN SERPL-MCNC: 129 NG/ML (ref 8–252)
GLOBULIN SER-MCNC: 2.9 G/DL (ref 2–4)
GLUCOSE SERPL-MCNC: 115 MG/DL (ref 70–99)
HCT VFR BLD CALC: 31 % (ref 36–46.5)
HGB BLD-MCNC: 10.4 G/DL (ref 12–15.5)
IRON SATN MFR SERPL: 19 % (ref 15–45)
IRON SERPL-MCNC: 60 MCG/DL (ref 50–170)
LYMPHOCYTES # BLD: 1 K/MCL (ref 1–4)
LYMPHOCYTES NFR BLD: 10 %
MCH RBC QN AUTO: 33 PG (ref 26–34)
MCHC RBC AUTO-ENTMCNC: 33.5 G/DL (ref 32–36.5)
MCV RBC AUTO: 98.4 FL (ref 78–100)
MONOCYTES # BLD: 0.5 K/MCL (ref 0.3–0.9)
MONOCYTES NFR BLD: 5 %
NEUTROPHILS # BLD: 6.7 K/MCL (ref 1.8–7.7)
NEUTS SEG NFR BLD: 69 %
NRBC BLD MANUAL-RTO: 0 /100 WBC
PLAT MORPH BLD: NORMAL
PLATELET # BLD AUTO: 221 K/MCL (ref 140–450)
POTASSIUM SERPL-SCNC: 3.8 MMOL/L (ref 3.4–5.1)
PROT SERPL-MCNC: 6.9 G/DL (ref 6.4–8.2)
RBC # BLD: 3.15 MIL/MCL (ref 4–5.2)
RBC MORPH BLD: NORMAL
SODIUM SERPL-SCNC: 140 MMOL/L (ref 135–145)
TIBC SERPL-MCNC: 311 MCG/DL (ref 250–450)
VIT B12 SERPL-MCNC: 650 PG/ML (ref 211–911)
WBC # BLD: 9.7 K/MCL (ref 4.2–11)
WBC MORPH BLD: NORMAL

## 2024-05-30 PROCEDURE — 36415 COLL VENOUS BLD VENIPUNCTURE: CPT

## 2024-05-30 PROCEDURE — 82728 ASSAY OF FERRITIN: CPT

## 2024-05-30 PROCEDURE — 85027 COMPLETE CBC AUTOMATED: CPT

## 2024-05-30 PROCEDURE — 3078F DIAST BP <80 MM HG: CPT | Performed by: INTERNAL MEDICINE

## 2024-05-30 PROCEDURE — 99211 OFF/OP EST MAY X REQ PHY/QHP: CPT

## 2024-05-30 PROCEDURE — 81206 BCR/ABL1 GENE MAJOR BP: CPT

## 2024-05-30 PROCEDURE — 99215 OFFICE O/P EST HI 40 MIN: CPT | Performed by: INTERNAL MEDICINE

## 2024-05-30 PROCEDURE — G2211 COMPLEX E/M VISIT ADD ON: HCPCS | Performed by: INTERNAL MEDICINE

## 2024-05-30 PROCEDURE — 80053 COMPREHEN METABOLIC PANEL: CPT

## 2024-05-30 PROCEDURE — 82607 VITAMIN B-12: CPT

## 2024-05-30 PROCEDURE — 3075F SYST BP GE 130 - 139MM HG: CPT | Performed by: INTERNAL MEDICINE

## 2024-05-30 PROCEDURE — 83550 IRON BINDING TEST: CPT

## 2024-05-30 RX ORDER — CALCITRIOL 0.25 UG/1
0.25 CAPSULE, LIQUID FILLED ORAL
COMMUNITY
Start: 2023-11-20

## 2024-05-30 ASSESSMENT — PATIENT HEALTH QUESTIONNAIRE - PHQ9
2. FEELING DOWN, DEPRESSED OR HOPELESS: NOT AT ALL
1. LITTLE INTEREST OR PLEASURE IN DOING THINGS: NOT AT ALL
2. FEELING DOWN, DEPRESSED OR HOPELESS: NOT AT ALL
SUM OF ALL RESPONSES TO PHQ9 QUESTIONS 1 AND 2: 0
SUM OF ALL RESPONSES TO PHQ9 QUESTIONS 1 AND 2: 0
CLINICAL INTERPRETATION OF PHQ2 SCORE: NO FURTHER SCREENING NEEDED
SUM OF ALL RESPONSES TO PHQ9 QUESTIONS 1 AND 2: 0
SUM OF ALL RESPONSES TO PHQ9 QUESTIONS 1 AND 2: 0
CLINICAL INTERPRETATION OF PHQ2 SCORE: NO FURTHER SCREENING NEEDED
1. LITTLE INTEREST OR PLEASURE IN DOING THINGS: NOT AT ALL

## 2024-05-30 ASSESSMENT — PAIN SCALES - GENERAL: PAINLEVEL: 0

## 2024-06-03 DIAGNOSIS — I10 ESSENTIAL HYPERTENSION, BENIGN: ICD-10-CM

## 2024-06-03 LAB
SERVICE CMNT-IMP: NORMAL
T(ABL1,BCR)P210 BLD/T QL: NOT DETECTED

## 2024-06-03 NOTE — TELEPHONE ENCOUNTER
Requested Prescriptions     Pending Prescriptions Disp Refills    LISINOPRIL 40 MG Oral Tab [Pharmacy Med Name: Lisinopril 40 Mg Tab Lupi] 90 tablet 0     Sig: Take 1 tablet (40 mg total) by mouth daily.     LOV 3/7/2024     Patient was asked to follow-up in: Follow-up not documented in note    Appointment scheduled: Visit date not found     Medication NOT refilled per protocol.    Hypertension Medications Protocol [id number]/03/2024 09:57 AM   Protocol Details Last BP reading less than 140/90    EGFRCR or GFRNAA > 50    CMP or BMP in past 12 months    In person appointment or virtual visit in the past 12 mos or appointment in next 3 mos

## 2024-06-04 RX ORDER — LISINOPRIL 40 MG/1
40 TABLET ORAL DAILY
Qty: 90 TABLET | Refills: 0 | Status: SHIPPED | OUTPATIENT
Start: 2024-06-04

## 2024-06-13 ENCOUNTER — OFFICE VISIT (OUTPATIENT)
Facility: LOCATION | Age: 54
End: 2024-06-13
Payer: COMMERCIAL

## 2024-06-13 VITALS — HEART RATE: 81 BPM | OXYGEN SATURATION: 98 % | TEMPERATURE: 98 F

## 2024-06-13 DIAGNOSIS — K60.2 ANAL FISSURE: Primary | ICD-10-CM

## 2024-06-13 PROCEDURE — 99024 POSTOP FOLLOW-UP VISIT: CPT | Performed by: STUDENT IN AN ORGANIZED HEALTH CARE EDUCATION/TRAINING PROGRAM

## 2024-06-13 NOTE — PROGRESS NOTES
Follow Up Visit Note       Active Problems      1. Anal fissure          Chief Complaint   Chief Complaint   Patient presents with    Post-Op     PO - ANAL EXAMINATION UNDER ANESTHESIA, PERIANAL INJECTION OF BOTOX 3/22/24, NO SYMPTOMS.         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  She did well for about 6 months after this initial procedure but had return of more severe anorectal pain recently.  Examination revealed a persistent/recurrent posterior midline anal fissure.  Most recently, she underwent repeat perianal injection of Botox on 3/22/2024.  She returns for follow-up today.    Patient is doing well at this time.  She is having intermittent pain with bowel movements but much less than what she had prior to Botox.  She is using topical nifedipine/lidocaine ointment as needed.  She is taking Metamucil and using stool softeners.    Allergies  Adriana is allergic to magnesium.    Past Medical / Surgical / Social / Family History    The past medical and past surgical history have been reviewed by me today.    Past Medical History:    Anal fissure    Anemia    Arrhythmia    A-FIB 2020- on metoprolol    Blood disorder    luekemia    Calculus of gallbladder without mention of cholecystitis or obstruction    Calculus of kidney    Chronic myeloid leukemia, without mention of having achieved remission    diagnosed 2003    CML (chronic myelocytic leukemia) (HCC)    Dysfunctional uterine bleeding    Dysmenorrhea    Essential hypertension, benign    Exposure to medical diagnostic radiation    12/23  oral radiation pill thyroid cancer    Gestational diabetes mellitus (HCC)    H/O mammogram    BENIGN FINDINGS    High blood pressure    History of blood transfusion    History of recent hospitalization    X2 days at  - had abd pain, N/V low potassium and inflammation in  Kidneys    Nausea and vomiting, unspecified vomiting type    Pap smear for cervical cancer screening    wnl neg hpv    Peptic ulcer    Personal history of antineoplastic chemotherapy    daily oral pills currently    Renal disorder    stage 3 kidney disease    Thyroid cancer (HCC)    Surgery- in march and now scheduled for 10/6/23 total thyroid completion surgery- no chemo / or radiation for thyroid cancer    Visual impairment    readers    [place] teeth extracted     Past Surgical History:   Procedure Laterality Date    Cholecystectomy      D & c  10/26/2012    Endometrial ablation  10/26/2012    d&c followed by novasure ablation    Hysteroscopy      Mirena, iud  09/25/2012    Other surgical history      Botox surgery for fissure 3/2023    Removal gallbladder  01/01/2005    Thyroidectomy      partial / left thyroid 8/23/2023 and rest, right, removed 10/06/2023, and transplant of parathryroids back in       The family history and social history have been reviewed by me today.    Family History   Problem Relation Age of Onset    Thyroid Cancer Self     Other (adrenal cancer) Mother     Other (abdominal cancer) Father         pancreatic bladder & liver    Cancer Maternal Grandfather     Heart Disease Paternal Grandmother     Stroke Paternal Grandfather      Social History     Socioeconomic History    Marital status:    Occupational History    Occupation: Homemaker    Tobacco Use    Smoking status: Never    Smokeless tobacco: Never   Vaping Use    Vaping status: Never Used   Substance and Sexual Activity    Alcohol use: No     Alcohol/week: 0.0 standard drinks of alcohol    Drug use: No    Sexual activity: Not Currently   Other Topics Concern    Caffeine Concern No    Exercise Yes     Comment: walks every day, plays sports    Seat Belt Yes    Self-Exams Yes     Comment: self breast exam 2 times a month        Current Outpatient Medications:     LISINOPRIL 40 MG Oral Tab, Take 1 tablet (40 mg total) by mouth  daily., Disp: 90 tablet, Rfl: 0    traMADol 50 MG Oral Tab, Take 1 tablet (50 mg total) by mouth as needed for Pain. 1 tablet by mouth every 4-6 hours as needed for pain., Disp: 6 tablet, Rfl: 0    levothyroxine 125 MCG Oral Tab, Take 1 tablet (125 mcg total) by mouth before breakfast., Disp: , Rfl:     calcitriol 0.25 MCG Oral Cap, Take 1 capsule (0.25 mcg total) by mouth 2 (two) times daily., Disp: , Rfl:     NON FORMULARY, Nifedipine and lidocaine ointment to aa as directed, Disp: , Rfl:     calcium carbonate 500 MG Oral Tab, Take 2 tablets (1,000 mg total) by mouth 2 (two) times daily with meals., Disp: 120 tablet, Rfl: 0    Magnesium Cl-Calcium Carbonate (SLOW-MAG) 71.5-119 MG Oral Tab EC, Take 2 tablets by mouth daily., Disp: 60 tablet, Rfl: 0    docusate sodium 100 MG Oral Cap, Take 1 capsule (100 mg total) by mouth daily., Disp: , Rfl:     Metoprolol Succinate ER 25 MG Oral Tablet 24 Hr, Take 1 tablet (25 mg total) by mouth daily., Disp: 90 tablet, Rfl: 0    Aspirin 81 MG Oral Cap, Take 81 mg by mouth daily with lunch., Disp: 1 tablet, Rfl: 0    Imatinib Mesylate 100 MG Oral Tab, Take 3 tablets (300 mg total) by mouth in the morning and 3 tablets (300 mg total) before bedtime., Disp: , Rfl:     Multiple Vitamins-Minerals (CENTRUM OR), Take 1 capsule by mouth daily as needed., Disp: , Rfl:      Review of Systems  A 10 point review of systems was performed and negative unless otherwise documented per HPI.     Physical Findings   Pulse 81   Temp 97.5 °F (36.4 °C) (Temporal)   SpO2 98%   Physical Exam  Vitals and nursing note reviewed. Exam conducted with a chaperone present.   Constitutional:       General: She is not in acute distress.  HENT:      Head: Normocephalic and atraumatic.      Mouth/Throat:      Mouth: Mucous membranes are moist.   Cardiovascular:      Rate and Rhythm: Normal rate and regular rhythm.   Pulmonary:      Effort: Pulmonary effort is normal.   Abdominal:      General: There is no  distension.      Palpations: Abdomen is soft.      Tenderness: There is no abdominal tenderness.   Genitourinary:     Comments: Patient examined in the prone jackknife position with female medical assistant chaperone present.  External exam of the anus reveals a superficial posterior midline anal fissure  Musculoskeletal:         General: No deformity.   Skin:     General: Skin is warm and dry.   Neurological:      General: No focal deficit present.      Mental Status: She is alert.   Psychiatric:         Mood and Affect: Mood normal.          Assessment   1. 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  She did well for about 6 months after this initial procedure but had return of more severe anorectal pain recently.  Examination revealed a persistent/recurrent posterior midline anal fissure.  Most recently, she underwent repeat perianal injection of Botox on 3/22/2024.  She returns for follow-up today.    Patient is doing well at this time.  She is having intermittent pain with bowel movements but much less than what she had prior to Botox.  She is using topical nifedipine/lidocaine ointment as needed.  She is taking Metamucil and using stool softeners.    External exam of the anus reveals a superficial posterior midline anal fissure.    Plan   Continue bowel regimen as needed to prevent straining and constipation.  I would like to see the patient back in 3 months time for ongoing follow-up and repeat anorectal examination.  We may need to consider lateral internal sphincterotomy if patient continues to have a non-healing anal fissure with life-limiting symptoms as she has failed to heal after Botox x 2.  Return precautions in the meantime discussed.  Patient expressed understanding and was agreeable to plan.     No orders of the defined types were placed in this  encounter.      Imaging & Referrals   None    Follow Up  No follow-ups on file.    Luan Herrera MD

## 2024-09-02 DIAGNOSIS — I10 ESSENTIAL HYPERTENSION, BENIGN: ICD-10-CM

## 2024-09-04 RX ORDER — LISINOPRIL 40 MG/1
40 TABLET ORAL DAILY
Qty: 90 TABLET | Refills: 3 | Status: SHIPPED | OUTPATIENT
Start: 2024-09-04

## 2024-09-04 NOTE — TELEPHONE ENCOUNTER
Requested Prescriptions     Pending Prescriptions Disp Refills    LISINOPRIL 40 MG Oral Tab [Pharmacy Med Name: Lisinopril 40 Mg Tab Lupi] 90 tablet 0     Sig: Take 1 tablet (40 mg total) by mouth daily.     LOV 3/7/2024     Patient was asked to follow-up in: Follow-up not documented in note    Appointment scheduled: 9/13/2024 Ariella Peter MD     Medication NOT refilled per protocol.    Hypertension Medications Protocol [id number]/02/2024 09:46 AM   Protocol Details Last BP reading less than 140/90    EGFRCR or GFRNAA > 50    CMP or BMP in past 12 months    In person appointment or virtual visit in the past 12 mos or appointment in next 3 mos

## 2024-09-26 ENCOUNTER — APPOINTMENT (OUTPATIENT)
Dept: LAB | Age: 54
End: 2024-09-26
Attending: INTERNAL MEDICINE

## 2024-09-26 ENCOUNTER — APPOINTMENT (OUTPATIENT)
Dept: HEMATOLOGY/ONCOLOGY | Age: 54
End: 2024-09-26
Attending: INTERNAL MEDICINE

## 2024-10-09 ENCOUNTER — LAB SERVICES (OUTPATIENT)
Dept: LAB | Age: 54
End: 2024-10-09
Attending: INTERNAL MEDICINE

## 2024-10-09 ENCOUNTER — OFFICE VISIT (OUTPATIENT)
Dept: HEMATOLOGY/ONCOLOGY | Age: 54
End: 2024-10-09
Attending: INTERNAL MEDICINE

## 2024-10-09 VITALS
DIASTOLIC BLOOD PRESSURE: 73 MMHG | SYSTOLIC BLOOD PRESSURE: 145 MMHG | BODY MASS INDEX: 30.12 KG/M2 | HEART RATE: 74 BPM | WEIGHT: 170 LBS | OXYGEN SATURATION: 98 % | TEMPERATURE: 97.2 F | HEIGHT: 63 IN

## 2024-10-09 DIAGNOSIS — C92.11 BCR/ABL1-POSITIVE CHRONIC MYELOID LEUKEMIA (CML) IN REMISSION  (CMD): ICD-10-CM

## 2024-10-09 DIAGNOSIS — C92.11 BCR/ABL1-POSITIVE CHRONIC MYELOID LEUKEMIA (CML) IN REMISSION  (CMD): Primary | ICD-10-CM

## 2024-10-09 LAB
ALBUMIN SERPL-MCNC: 3.6 G/DL (ref 3.4–5)
ALBUMIN/GLOB SERPL: 1.2 {RATIO} (ref 1–2.4)
ALP SERPL-CCNC: 56 UNITS/L (ref 45–117)
ALT SERPL-CCNC: 18 UNITS/L
ANION GAP SERPL CALC-SCNC: 7 MMOL/L (ref 7–19)
AST SERPL-CCNC: 23 UNITS/L
BASOPHILS # BLD: 0 K/MCL (ref 0–0.3)
BASOPHILS NFR BLD: 0 %
BILIRUB SERPL-MCNC: 0.4 MG/DL (ref 0.2–1)
BUN SERPL-MCNC: 21 MG/DL (ref 6–20)
BUN/CREAT SERPL: 15 (ref 7–25)
CALCIUM SERPL-MCNC: 8.7 MG/DL (ref 8.4–10.2)
CHLORIDE SERPL-SCNC: 104 MMOL/L (ref 97–110)
CO2 SERPL-SCNC: 30 MMOL/L (ref 21–32)
CREAT SERPL-MCNC: 1.37 MG/DL (ref 0.51–0.95)
DEPRECATED RDW RBC: 49.6 FL (ref 39–50)
EGFRCR SERPLBLD CKD-EPI 2021: 45 ML/MIN/{1.73_M2}
EOSINOPHIL # BLD: 0 K/MCL (ref 0–0.5)
EOSINOPHIL NFR BLD: 0 %
ERYTHROCYTE [DISTWIDTH] IN BLOOD: 14 % (ref 11–15)
FASTING DURATION TIME PATIENT: ABNORMAL H
GLOBULIN SER-MCNC: 3 G/DL (ref 2–4)
GLUCOSE SERPL-MCNC: 122 MG/DL (ref 70–99)
HCT VFR BLD CALC: 28.1 % (ref 36–46.5)
HGB BLD-MCNC: 9.5 G/DL (ref 12–15.5)
IMM GRANULOCYTES # BLD AUTO: 0 K/MCL (ref 0–0.2)
IMM GRANULOCYTES # BLD: 0 %
LYMPHOCYTES # BLD: 1.2 K/MCL (ref 1–4)
LYMPHOCYTES NFR BLD: 14 %
MCH RBC QN AUTO: 32.8 PG (ref 26–34)
MCHC RBC AUTO-ENTMCNC: 33.8 G/DL (ref 32–36.5)
MCV RBC AUTO: 96.9 FL (ref 78–100)
MONOCYTES # BLD: 0.6 K/MCL (ref 0.3–0.9)
MONOCYTES NFR BLD: 8 %
NEUTROPHILS # BLD: 6.4 K/MCL (ref 1.8–7.7)
NEUTROPHILS NFR BLD: 78 %
NRBC BLD MANUAL-RTO: 0 /100 WBC
PLATELET # BLD AUTO: 218 K/MCL (ref 140–450)
POTASSIUM SERPL-SCNC: 3.9 MMOL/L (ref 3.4–5.1)
PROT SERPL-MCNC: 6.6 G/DL (ref 6.4–8.2)
RBC # BLD: 2.9 MIL/MCL (ref 4–5.2)
SODIUM SERPL-SCNC: 137 MMOL/L (ref 135–145)
WBC # BLD: 8.2 K/MCL (ref 4.2–11)

## 2024-10-09 PROCEDURE — 3078F DIAST BP <80 MM HG: CPT | Performed by: INTERNAL MEDICINE

## 2024-10-09 PROCEDURE — 3077F SYST BP >= 140 MM HG: CPT | Performed by: INTERNAL MEDICINE

## 2024-10-09 PROCEDURE — 81206 BCR/ABL1 GENE MAJOR BP: CPT

## 2024-10-09 PROCEDURE — 36415 COLL VENOUS BLD VENIPUNCTURE: CPT

## 2024-10-09 PROCEDURE — 85025 COMPLETE CBC W/AUTO DIFF WBC: CPT

## 2024-10-09 PROCEDURE — 99215 OFFICE O/P EST HI 40 MIN: CPT | Performed by: INTERNAL MEDICINE

## 2024-10-09 PROCEDURE — 99211 OFF/OP EST MAY X REQ PHY/QHP: CPT

## 2024-10-09 PROCEDURE — 80053 COMPREHEN METABOLIC PANEL: CPT

## 2024-10-09 ASSESSMENT — PATIENT HEALTH QUESTIONNAIRE - PHQ9
CLINICAL INTERPRETATION OF PHQ2 SCORE: NO FURTHER SCREENING NEEDED
SUM OF ALL RESPONSES TO PHQ9 QUESTIONS 1 AND 2: 0
SUM OF ALL RESPONSES TO PHQ9 QUESTIONS 1 AND 2: 0
1. LITTLE INTEREST OR PLEASURE IN DOING THINGS: NOT AT ALL
2. FEELING DOWN, DEPRESSED OR HOPELESS: NOT AT ALL
CLINICAL INTERPRETATION OF PHQ2 SCORE: NO FURTHER SCREENING NEEDED
1. LITTLE INTEREST OR PLEASURE IN DOING THINGS: NOT AT ALL
SUM OF ALL RESPONSES TO PHQ9 QUESTIONS 1 AND 2: 0
SUM OF ALL RESPONSES TO PHQ9 QUESTIONS 1 AND 2: 0
2. FEELING DOWN, DEPRESSED OR HOPELESS: NOT AT ALL

## 2024-10-09 ASSESSMENT — PAIN SCALES - GENERAL
PAINLEVEL: 0
PAINLEVEL: 0

## 2024-10-11 LAB
SERVICE CMNT-IMP: NORMAL
T(ABL1,BCR)P210 BLD/T QL: NOT DETECTED

## 2024-10-30 ENCOUNTER — OFFICE VISIT (OUTPATIENT)
Dept: FAMILY MEDICINE CLINIC | Facility: CLINIC | Age: 54
End: 2024-10-30
Payer: COMMERCIAL

## 2024-10-30 VITALS
TEMPERATURE: 99 F | DIASTOLIC BLOOD PRESSURE: 70 MMHG | RESPIRATION RATE: 16 BRPM | SYSTOLIC BLOOD PRESSURE: 138 MMHG | HEIGHT: 62.5 IN | WEIGHT: 172 LBS | BODY MASS INDEX: 30.86 KG/M2 | HEART RATE: 80 BPM

## 2024-10-30 DIAGNOSIS — Z23 FLU VACCINE NEED: ICD-10-CM

## 2024-10-30 DIAGNOSIS — D63.8 ANEMIA OF CHRONIC DISEASE: ICD-10-CM

## 2024-10-30 DIAGNOSIS — I10 ESSENTIAL HYPERTENSION, BENIGN: ICD-10-CM

## 2024-10-30 DIAGNOSIS — Z00.00 ENCOUNTER FOR ROUTINE HISTORY AND PHYSICAL EXAMINATION: Primary | ICD-10-CM

## 2024-10-30 DIAGNOSIS — I48.91 ATRIAL FIBRILLATION, UNSPECIFIED TYPE (HCC): ICD-10-CM

## 2024-10-30 DIAGNOSIS — E87.6 HYPOKALEMIA: ICD-10-CM

## 2024-10-30 DIAGNOSIS — Z12.31 ENCOUNTER FOR SCREENING MAMMOGRAM FOR BREAST CANCER: ICD-10-CM

## 2024-10-30 PROBLEM — R73.9 HYPERGLYCEMIA: Status: RESOLVED | Noted: 2023-01-29 | Resolved: 2024-10-30

## 2024-10-30 PROBLEM — M75.41 IMPINGEMENT SYNDROME OF RIGHT SHOULDER: Status: RESOLVED | Noted: 2017-08-03 | Resolved: 2024-10-30

## 2024-10-30 RX ORDER — LISINOPRIL 40 MG/1
40 TABLET ORAL DAILY
Qty: 90 TABLET | Refills: 3 | Status: SHIPPED | OUTPATIENT
Start: 2024-10-30

## 2024-10-30 RX ORDER — LEVOTHYROXINE SODIUM 112 UG/1
112 TABLET ORAL
COMMUNITY

## 2024-10-30 NOTE — PROGRESS NOTES
[facility]    CC: yearly exam     HPI: Adriana Pearson is 54 year old female here for a yearly physical.  Healthcare maintenance.  Exercise - walking 4-7k steps per day.  Sunscreen -.  Caffeine -.  Advanced directives  Additional problems discussed:   1. HTN. Lisinopril. Tolerating well. Today denies CP, dyspnea, or blurred vision. No dizziness or presyncope.  BP Readings from Last 3 Encounters:   10/30/24 138/70   03/22/24 143/70   03/07/24 132/70     2. Afib. Takes BB.  Not endorsing palpitations. Follows with [facility]. Metoprolol ER on board.  3.  Hypokalemia/hypocalcemia. Most recent K 3.9. Eats two bananas per day and feels this is helping to manage the problem.  4. CML - follows with hem/onc Dr. Holbrook. Dx at age 33. Has been on Imatinab since 2004  5. Anemia of chronic disease. Most recent hemoglobin 9.5 which is below prior baseline. Follows with hem/onc for CML who is managing anemia, she has started iron pill and has follow up labs planned.  6. Renal dysfunction. She has established with Dr. Dang. With [facility].  Most recent Cr 1.37, GFR 45, improved from these number slast year  7. Thyroid cancer. Follows with ENT and endocrinology (Dr. Cedeño). Levothyroxine per Dr. Cedeño. She also  follows up with Dr. Marie.  8. 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  Was advised that hte upper facial nerves may gradually improve over time. She is not doing therapy at this point. She can eat and drink normally. Right eye does not close all the way. Seeing ophthalmology and putting tape and medication on at night for her right eye.    Last Physical 10/30/2024     SH: reviewed     FH: reviewed     Social History     Social History Narrative    Not on file        ROS: full 10 point review of systems done and otherwise negative.      Healthcare Maintenance:  Health Maintenance   Topic Date Due    Pneumococcal Vaccine: Birth to 64yrs (1 of 2 - PCV) Never done    Zoster  Vaccines (1 of 2) Never done    Annual Depression Screening  01/01/2024    COVID-19 Vaccine (6 - 2023-24 season) 09/01/2024    Influenza Vaccine (1) 10/01/2024    Annual Physical  10/26/2024    Mammogram  11/28/2024    Pap Smear  04/13/2025    Colorectal Cancer Screening  10/25/2025    DTaP,Tdap,and Td Vaccines (2 - Td or Tdap) 10/26/2033     Flu vaccine today 10/30/2024  Declines prevnar 20  She plans to get zoster at the pharmacy  Mammogram due in 11/2024, ordered  COVID19 vaccine recommended       PE:  Vital Signs    10/30/24 1010   BP: 138/70   Pulse: 80   Resp: 16   Temp: 99.2 °F (37.3 °C)     Wt Readings from Last 3 Encounters:   10/30/24 172 lb (78 kg)   03/22/24 166 lb 9.6 oz (75.6 kg)   03/07/24 171 lb (77.6 kg)     Body mass index is 30.96 kg/m².     General: Comfortable, pleasant, NAD, appears stated age  HEENT.  NC/AT, no conjunctival injection, TMs clear, oropharynx without erythema or exudate, neck supple, no LAD  Pulm. CTAB, no W/R/R, comfortable work of breathing, speaks in full sentences  Abdomen. Soft, nt, nd  .  deferred  Extremities.  2+ DP pulses, no edema  Skin.  No concerning moles   NEURO: Note right upper and lower facial paralysis, normal RUE and RLE strength     No visits with results within 4 Week(s) from this visit.   Latest known visit with results is:   Admission on 03/22/2024, Discharged on 03/22/2024   Component Date Value Ref Range Status    POCT Urine Pregnancy 03/22/2024 Negative  Negative Final    POC Glucose 03/22/2024 115 (H)  70 - 99 mg/dL Final        A/P: Adriana Pearson is 54 year old yo female here for complete physical.  Healthcare Maintenance. Discussed eye exam, dentist visit q6 months, sunscreen, exercise at least 5x/week, 30 minutes daily, and limiting caffeine intake.   1. HTN. Ding well with lisinopril, labs reviewed and refills provided.  2. Afib. RRR today, BB on board, Cardiology on board.  3.  Hypokalemia/hypocalcemia. Labs reviewed, no change to current  management.  4. CML - follows with hem/onc Dr. Holbrook.   5. Anemia of chronic disease. Worsened but patient with close follow up with heme and taking iron.  6. Renal dysfunction. Improved from previous, cont to monitor yearly and as recommended by specialist.  7. Thyroid cancer. Follows with ENT and endocrinology (Dr. Cedeño). Levothyroxine per Dr. Cedeño. She also  follows up with Dr. Marie.  8. 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  Noted on exam and she will continue to follow with her specialists including ophtho for recommendations regarding medication/taping of the right eye at night.    Encounter Medications[1]        Side effects, risks and benefits of medications were explained.  The patient or responsible adult showed the ability to learn, asked appropriate questions.  There were no barriers to learning and they verbalized understanding of the treatment plan.     Medication list provided to patient and /or family member.     This note was created in part by Dragon recognition software.  Please excuse errors.                 [1]   Outpatient Encounter Medications as of 10/30/2024   Medication Sig Dispense Refill    levothyroxine 112 MCG Oral Tab Take 1 tablet (112 mcg total) by mouth before breakfast.      lisinopril 40 MG Oral Tab Take 1 tablet (40 mg total) by mouth daily. 90 tablet 3    calcitriol 0.25 MCG Oral Cap Take 1 capsule (0.25 mcg total) by mouth 2 (two) times daily.      NON FORMULARY Nifedipine and lidocaine ointment to aa as directed      Magnesium Cl-Calcium Carbonate (SLOW-MAG) 71.5-119 MG Oral Tab EC Take 2 tablets by mouth daily. 60 tablet 0    docusate sodium 100 MG Oral Cap Take 1 capsule (100 mg total) by mouth daily.      Metoprolol Succinate ER 25 MG Oral Tablet 24 Hr Take 1 tablet (25 mg total) by mouth daily. 90 tablet 0    Aspirin 81 MG Oral Cap Take 81 mg by mouth daily with lunch. 1 tablet 0    Imatinib Mesylate 100 MG Oral Tab Take 3 tablets (300 mg total) by mouth in  the morning and 3 tablets (300 mg total) before bedtime.      Multiple Vitamins-Minerals (CENTRUM OR) Take 1 capsule by mouth daily as needed.      traMADol 50 MG Oral Tab Take 1 tablet (50 mg total) by mouth as needed for Pain. 1 tablet by mouth every 4-6 hours as needed for pain. 6 tablet 0    [DISCONTINUED] levothyroxine 125 MCG Oral Tab Take 1 tablet (125 mcg total) by mouth before breakfast.      [DISCONTINUED] calcium carbonate 500 MG Oral Tab Take 2 tablets (1,000 mg total) by mouth 2 (two) times daily with meals. 120 tablet 0     No facility-administered encounter medications on file as of 10/30/2024.

## 2024-11-04 ENCOUNTER — OFFICE VISIT (OUTPATIENT)
Facility: LOCATION | Age: 54
End: 2024-11-04
Payer: COMMERCIAL

## 2024-11-04 VITALS
RESPIRATION RATE: 18 BRPM | SYSTOLIC BLOOD PRESSURE: 150 MMHG | TEMPERATURE: 98 F | DIASTOLIC BLOOD PRESSURE: 70 MMHG | OXYGEN SATURATION: 98 % | HEART RATE: 76 BPM

## 2024-11-04 DIAGNOSIS — K60.2 ANAL FISSURE: Primary | ICD-10-CM

## 2024-11-04 PROCEDURE — 3077F SYST BP >= 140 MM HG: CPT | Performed by: STUDENT IN AN ORGANIZED HEALTH CARE EDUCATION/TRAINING PROGRAM

## 2024-11-04 PROCEDURE — 99213 OFFICE O/P EST LOW 20 MIN: CPT | Performed by: STUDENT IN AN ORGANIZED HEALTH CARE EDUCATION/TRAINING PROGRAM

## 2024-11-04 PROCEDURE — 3078F DIAST BP <80 MM HG: CPT | Performed by: STUDENT IN AN ORGANIZED HEALTH CARE EDUCATION/TRAINING PROGRAM

## 2024-11-06 NOTE — PROGRESS NOTES
Follow Up Visit Note       Active Problems      1. 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  She did well for about 6 months after this initial procedure but had return of more severe anorectal pain.  Examination revealed a persistent/recurrent posterior midline anal fissure.  Most recently, she underwent repeat perianal injection of Botox on 3/22/2024.  She returns for follow-up today.    Patient had major head and neck surgery with ENT at [place] recently.  She is doing well overall from an anorectal standpoint.  She has occasional rectal pain with bowel movements which is relieved by taking a sitz bath as needed.  She also continues to use the nifedipine/lidocaine ointment as needed.  She is taking a stool softener and generally has soft stool without constipation.  She denies any rectal bleeding, prolapse, seepage, itching or incontinence.    Allergies  Adriana is allergic to magnesium.    Past Medical / Surgical / Social / Family History    The past medical and past surgical history have been reviewed by me today.    Past Medical History:    Anal fissure    Anemia    Arrhythmia    A-FIB 2020- on metoprolol    Blood disorder    luekemia    Calculus of gallbladder without mention of cholecystitis or obstruction    Calculus of kidney    Cancer (HCC)    Chronic myeloid leukemia, without mention of having achieved remission    diagnosed 2003    CML (chronic myelocytic leukemia) (HCC)    Dysfunctional uterine bleeding    Dysmenorrhea    Essential hypertension, benign    Exposure to medical diagnostic radiation    12/23  oral radiation pill  thyroid cancer    Gestational diabetes mellitus (HCC)    H/O mammogram    BENIGN FINDINGS    High blood pressure    History of blood transfusion    History of recent hospitalization    X2 days at  - had abd pain, N/V low potassium and inflammation in Kidneys    Nausea and vomiting, unspecified vomiting type    Pap smear for cervical cancer screening    wnl neg hpv    Peptic ulcer    Personal history of antineoplastic chemotherapy    daily oral pills currently    Renal disorder    stage 3 kidney disease    Thyroid cancer (HCC)    Surgery- in march and now scheduled for 10/6/23 total thyroid completion surgery- no chemo / or radiation for thyroid cancer    Visual impairment    readers    [place] teeth extracted     Past Surgical History:   Procedure Laterality Date    Cholecystectomy      D & c  10/26/2012    Endometrial ablation  10/26/2012    d&c followed by novasure ablation    Hysteroscopy      Mirena, iud  09/25/2012    Other surgical history      Botox surgery for fissure 3/2023    Removal gallbladder  01/01/2005    Thyroidectomy      partial / left thyroid 8/23/2023 and rest, right, removed 10/06/2023, and transplant of parathryroids back in       The family history and social history have been reviewed by me today.    Family History   Problem Relation Age of Onset    Thyroid Cancer Self     Other (adrenal cancer) Mother     Other (abdominal cancer) Father         pancreatic bladder & liver    Cancer Maternal Grandfather     Heart Disease Paternal Grandmother     Stroke Paternal Grandfather      Social History     Socioeconomic History    Marital status:    Occupational History    Occupation: Homemaker    Tobacco Use    Smoking status: Never    Smokeless tobacco: Never   Vaping Use    Vaping status: Never Used   Substance and Sexual Activity    Alcohol use: No     Alcohol/week: 0.0 standard drinks of alcohol    Drug use: No    Sexual activity: Not Currently   Other Topics Concern    Caffeine Concern No     Exercise Yes     Comment: walks every day    Seat Belt Yes    Self-Exams Yes     Comment: self breast exam 2 times a month        Current Outpatient Medications:     levothyroxine 112 MCG Oral Tab, Take 1 tablet (112 mcg total) by mouth before breakfast., Disp: , Rfl:     lisinopril 40 MG Oral Tab, Take 1 tablet (40 mg total) by mouth daily., Disp: 90 tablet, Rfl: 3    traMADol 50 MG Oral Tab, Take 1 tablet (50 mg total) by mouth as needed for Pain. 1 tablet by mouth every 4-6 hours as needed for pain., Disp: 6 tablet, Rfl: 0    calcitriol 0.25 MCG Oral Cap, Take 1 capsule (0.25 mcg total) by mouth 2 (two) times daily., Disp: , Rfl:     NON FORMULARY, Nifedipine and lidocaine ointment to aa as directed, Disp: , Rfl:     Magnesium Cl-Calcium Carbonate (SLOW-MAG) 71.5-119 MG Oral Tab EC, Take 2 tablets by mouth daily., Disp: 60 tablet, Rfl: 0    docusate sodium 100 MG Oral Cap, Take 1 capsule (100 mg total) by mouth daily., Disp: , Rfl:     Metoprolol Succinate ER 25 MG Oral Tablet 24 Hr, Take 1 tablet (25 mg total) by mouth daily., Disp: 90 tablet, Rfl: 0    Aspirin 81 MG Oral Cap, Take 81 mg by mouth daily with lunch., Disp: 1 tablet, Rfl: 0    Imatinib Mesylate 100 MG Oral Tab, Take 3 tablets (300 mg total) by mouth in the morning and 3 tablets (300 mg total) before bedtime., Disp: , Rfl:     Multiple Vitamins-Minerals (CENTRUM OR), Take 1 capsule by mouth daily as needed., Disp: , Rfl:      Review of Systems  A 10 point review of systems was performed and negative unless otherwise documented per HPI.     Physical Findings   /70   Pulse 76   Temp 98 °F (36.7 °C) (Temporal)   Resp 18   SpO2 98%   Physical Exam  Vitals and nursing note reviewed. Exam conducted with a chaperone present.   Constitutional:       General: She is not in acute distress.  HENT:      Head: Normocephalic and atraumatic.      Mouth/Throat:      Mouth: Mucous membranes are moist.   Cardiovascular:      Rate and Rhythm: Normal rate  and regular rhythm.   Pulmonary:      Effort: Pulmonary effort is normal.   Abdominal:      General: There is no distension.      Palpations: Abdomen is soft.      Tenderness: There is no abdominal tenderness.   Genitourinary:     Comments: Patient examined in the prone jackknife position with female medical assistant chaperone present.  External exam of the anus reveals a superficial posterior midline anal fissure  Musculoskeletal:         General: No deformity.   Skin:     General: Skin is warm and dry.   Neurological:      General: No focal deficit present.      Mental Status: She is alert.   Psychiatric:         Mood and Affect: Mood normal.          Assessment   1. 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  She did well for about 6 months after this initial procedure but had return of more severe anorectal pain.  Examination revealed a persistent/recurrent posterior midline anal fissure.  Most recently, she underwent repeat perianal injection of Botox on 3/22/2024.  She returns for follow-up today.    Patient had major head and neck surgery with ENT at [place] recently.  She is doing well overall from an anorectal standpoint.  She has occasional rectal pain with bowel movements which is relieved by taking a sitz bath as needed.  She also continues to use the nifedipine/lidocaine ointment as needed.  She is taking a stool softener and generally has soft stool without constipation.  She denies any rectal bleeding, prolapse, seepage, itching or incontinence.    External exam of the anus reveals a superficial posterior midline anal fissure.    Plan   Continue bowel regimen as needed to prevent straining and constipation.  Continue topical lidocaine/nifedipine ointment as needed at this point.  Patient wished for me to prescribe a refill of this medication.  I would like to see  the patient back in 3 months time for ongoing follow-up and repeat anorectal examination.  Return precautions in the meantime discussed.  Patient expressed understanding and was agreeable to plan.     No orders of the defined types were placed in this encounter.      Imaging & Referrals   None    Follow Up  No follow-ups on file.    Luan Herrera MD

## 2024-11-19 DIAGNOSIS — C92.11 BCR/ABL1-POSITIVE CHRONIC MYELOID LEUKEMIA (CML) IN REMISSION  (CMD): ICD-10-CM

## 2024-11-19 RX ORDER — IMATINIB MESYLATE 100 MG/1
TABLET, FILM COATED ORAL
Qty: 180 TABLET | Refills: 6 | Status: ACTIVE | OUTPATIENT
Start: 2024-11-19

## 2024-12-10 DIAGNOSIS — C92.11 BCR/ABL1-POSITIVE CHRONIC MYELOID LEUKEMIA (CML) IN REMISSION  (CMD): ICD-10-CM

## 2024-12-11 RX ORDER — IMATINIB MESYLATE 100 MG/1
TABLET, FILM COATED ORAL
Qty: 180 TABLET | Refills: 6 | Status: ACTIVE | OUTPATIENT
Start: 2024-12-11 | End: 2024-12-12 | Stop reason: SDUPTHER

## 2024-12-12 DIAGNOSIS — C92.11 BCR/ABL1-POSITIVE CHRONIC MYELOID LEUKEMIA (CML) IN REMISSION  (CMD): ICD-10-CM

## 2024-12-12 RX ORDER — IMATINIB MESYLATE 100 MG/1
TABLET, FILM COATED ORAL
Qty: 180 TABLET | Refills: 11 | Status: SHIPPED | OUTPATIENT
Start: 2024-12-12

## 2024-12-26 ENCOUNTER — HOSPITAL ENCOUNTER (OUTPATIENT)
Dept: MAMMOGRAPHY | Age: 54
Discharge: HOME | End: 2024-12-26
Attending: STUDENT IN AN ORGANIZED HEALTH CARE EDUCATION/TRAINING PROGRAM
Payer: COMMERCIAL

## 2024-12-26 DIAGNOSIS — Z12.31 ENCOUNTER FOR SCREENING MAMMOGRAM FOR BREAST CANCER: ICD-10-CM

## 2024-12-26 PROCEDURE — 77067 SCR MAMMO BI INCL CAD: CPT | Performed by: STUDENT IN AN ORGANIZED HEALTH CARE EDUCATION/TRAINING PROGRAM

## 2024-12-26 PROCEDURE — 77063 BREAST TOMOSYNTHESIS BI: CPT | Performed by: STUDENT IN AN ORGANIZED HEALTH CARE EDUCATION/TRAINING PROGRAM

## 2025-01-15 ENCOUNTER — TELEPHONE (OUTPATIENT)
Dept: HEMATOLOGY/ONCOLOGY | Age: 55
End: 2025-01-15

## 2025-01-15 DIAGNOSIS — C92.11 BCR/ABL1-POSITIVE CHRONIC MYELOID LEUKEMIA (CML) IN REMISSION  (CMD): Primary | ICD-10-CM

## 2025-01-20 ENCOUNTER — IMMUNIZATION (OUTPATIENT)
Dept: LAB | Age: 55
End: 2025-01-20
Attending: EMERGENCY MEDICINE
Payer: COMMERCIAL

## 2025-01-20 ENCOUNTER — TELEPHONE (OUTPATIENT)
Dept: HEMATOLOGY/ONCOLOGY | Age: 55
End: 2025-01-20

## 2025-01-20 DIAGNOSIS — Z23 NEED FOR VACCINATION: Primary | ICD-10-CM

## 2025-01-20 PROCEDURE — 90480 ADMN SARSCOV2 VAC 1/ONLY CMP: CPT

## 2025-01-22 ENCOUNTER — TELEPHONE (OUTPATIENT)
Dept: HEMATOLOGY/ONCOLOGY | Age: 55
End: 2025-01-22

## 2025-01-23 DIAGNOSIS — C92.11 BCR/ABL1-POSITIVE CHRONIC MYELOID LEUKEMIA (CML) IN REMISSION  (CMD): ICD-10-CM

## 2025-01-23 RX ORDER — IMATINIB MESYLATE 100 MG/1
TABLET, FILM COATED ORAL
Qty: 180 TABLET | Refills: 11 | Status: SHIPPED | OUTPATIENT
Start: 2025-01-23 | End: 2025-01-23 | Stop reason: CLARIF

## 2025-01-23 RX ORDER — IMATINIB MESYLATE 100 MG/1
TABLET, FILM COATED ORAL
Qty: 180 TABLET | Refills: 11 | Status: ACTIVE | OUTPATIENT
Start: 2025-01-23

## 2025-02-12 ENCOUNTER — APPOINTMENT (OUTPATIENT)
Dept: LAB | Age: 55
End: 2025-02-12
Attending: INTERNAL MEDICINE

## 2025-02-12 ENCOUNTER — APPOINTMENT (OUTPATIENT)
Dept: HEMATOLOGY/ONCOLOGY | Age: 55
End: 2025-02-12
Attending: INTERNAL MEDICINE

## 2025-02-18 ENCOUNTER — OFFICE VISIT (OUTPATIENT)
Dept: HEMATOLOGY/ONCOLOGY | Age: 55
End: 2025-02-18
Attending: INTERNAL MEDICINE

## 2025-02-18 ENCOUNTER — LAB SERVICES (OUTPATIENT)
Dept: LAB | Age: 55
End: 2025-02-18
Attending: INTERNAL MEDICINE

## 2025-02-18 VITALS
OXYGEN SATURATION: 99 % | BODY MASS INDEX: 29.95 KG/M2 | HEART RATE: 83 BPM | DIASTOLIC BLOOD PRESSURE: 79 MMHG | WEIGHT: 169 LBS | SYSTOLIC BLOOD PRESSURE: 139 MMHG | TEMPERATURE: 97 F | RESPIRATION RATE: 16 BRPM | HEIGHT: 63 IN

## 2025-02-18 DIAGNOSIS — C92.11 BCR/ABL1-POSITIVE CHRONIC MYELOID LEUKEMIA (CML) IN REMISSION  (CMD): Primary | ICD-10-CM

## 2025-02-18 DIAGNOSIS — C92.11 BCR/ABL1-POSITIVE CHRONIC MYELOID LEUKEMIA (CML) IN REMISSION  (CMD): ICD-10-CM

## 2025-02-18 LAB
ALBUMIN SERPL-MCNC: 3.8 G/DL (ref 3.4–5)
ALBUMIN/GLOB SERPL: 1.3 {RATIO} (ref 1–2.4)
ALP SERPL-CCNC: 61 UNITS/L (ref 45–117)
ALT SERPL-CCNC: 22 UNITS/L
ANION GAP SERPL CALC-SCNC: 8 MMOL/L (ref 7–19)
AST SERPL-CCNC: 23 UNITS/L
BASOPHILS # BLD: 0 K/MCL (ref 0–0.3)
BASOPHILS NFR BLD: 1 %
BILIRUB SERPL-MCNC: 0.3 MG/DL (ref 0.2–1)
BUN SERPL-MCNC: 18 MG/DL (ref 6–20)
BUN/CREAT SERPL: 13 (ref 7–25)
CALCIUM SERPL-MCNC: 9 MG/DL (ref 8.4–10.2)
CHLORIDE SERPL-SCNC: 104 MMOL/L (ref 97–110)
CO2 SERPL-SCNC: 31 MMOL/L (ref 21–32)
CREAT SERPL-MCNC: 1.39 MG/DL (ref 0.51–0.95)
DEPRECATED RDW RBC: 50.2 FL (ref 39–50)
EGFRCR SERPLBLD CKD-EPI 2021: 45 ML/MIN/{1.73_M2}
EOSINOPHIL # BLD: 0 K/MCL (ref 0–0.5)
EOSINOPHIL NFR BLD: 0 %
ERYTHROCYTE [DISTWIDTH] IN BLOOD: 14.2 % (ref 11–15)
FASTING DURATION TIME PATIENT: ABNORMAL H
FERRITIN SERPL-MCNC: 138 NG/ML (ref 8–252)
GLOBULIN SER-MCNC: 2.9 G/DL (ref 2–4)
GLUCOSE SERPL-MCNC: 116 MG/DL (ref 70–99)
HCT VFR BLD CALC: 29.6 % (ref 36–46.5)
HGB BLD-MCNC: 10 G/DL (ref 12–15.5)
IMM GRANULOCYTES # BLD AUTO: 0 K/MCL (ref 0–0.2)
IMM GRANULOCYTES # BLD: 0 %
IRON SATN MFR SERPL: 23 % (ref 15–45)
IRON SERPL-MCNC: 65 MCG/DL (ref 50–170)
LYMPHOCYTES # BLD: 1.3 K/MCL (ref 1–4)
LYMPHOCYTES NFR BLD: 16 %
MCH RBC QN AUTO: 32.9 PG (ref 26–34)
MCHC RBC AUTO-ENTMCNC: 33.8 G/DL (ref 32–36.5)
MCV RBC AUTO: 97.4 FL (ref 78–100)
MONOCYTES # BLD: 0.6 K/MCL (ref 0.3–0.9)
MONOCYTES NFR BLD: 7 %
NEUTROPHILS # BLD: 6.1 K/MCL (ref 1.8–7.7)
NEUTROPHILS NFR BLD: 76 %
NRBC BLD MANUAL-RTO: 0 /100 WBC
PLATELET # BLD AUTO: 203 K/MCL (ref 140–450)
POTASSIUM SERPL-SCNC: 3.5 MMOL/L (ref 3.4–5.1)
PROT SERPL-MCNC: 6.7 G/DL (ref 6.4–8.2)
RBC # BLD: 3.04 MIL/MCL (ref 4–5.2)
SODIUM SERPL-SCNC: 139 MMOL/L (ref 135–145)
TIBC SERPL-MCNC: 283 MCG/DL (ref 250–450)
WBC # BLD: 8.1 K/MCL (ref 4.2–11)

## 2025-02-18 PROCEDURE — 85025 COMPLETE CBC W/AUTO DIFF WBC: CPT

## 2025-02-18 PROCEDURE — 83540 ASSAY OF IRON: CPT

## 2025-02-18 PROCEDURE — 80053 COMPREHEN METABOLIC PANEL: CPT

## 2025-02-18 PROCEDURE — 99211 OFF/OP EST MAY X REQ PHY/QHP: CPT

## 2025-02-18 PROCEDURE — 81206 BCR/ABL1 GENE MAJOR BP: CPT

## 2025-02-18 PROCEDURE — 3078F DIAST BP <80 MM HG: CPT | Performed by: INTERNAL MEDICINE

## 2025-02-18 PROCEDURE — 36415 COLL VENOUS BLD VENIPUNCTURE: CPT

## 2025-02-18 PROCEDURE — 3075F SYST BP GE 130 - 139MM HG: CPT | Performed by: INTERNAL MEDICINE

## 2025-02-18 PROCEDURE — 99214 OFFICE O/P EST MOD 30 MIN: CPT | Performed by: INTERNAL MEDICINE

## 2025-02-18 PROCEDURE — 82728 ASSAY OF FERRITIN: CPT

## 2025-02-18 ASSESSMENT — PATIENT HEALTH QUESTIONNAIRE - PHQ9
SUM OF ALL RESPONSES TO PHQ9 QUESTIONS 1 AND 2: 0
SUM OF ALL RESPONSES TO PHQ9 QUESTIONS 1 AND 2: 0
1. LITTLE INTEREST OR PLEASURE IN DOING THINGS: NOT AT ALL
2. FEELING DOWN, DEPRESSED OR HOPELESS: NOT AT ALL
CLINICAL INTERPRETATION OF PHQ2 SCORE: NO FURTHER SCREENING NEEDED

## 2025-02-18 ASSESSMENT — PAIN SCALES - GENERAL: PAINLEVEL: 0

## 2025-02-20 DIAGNOSIS — C92.11 BCR/ABL1-POSITIVE CHRONIC MYELOID LEUKEMIA (CML) IN REMISSION  (CMD): Primary | ICD-10-CM

## 2025-02-20 LAB
SERVICE CMNT-IMP: ABNORMAL
T(9;22)(ABL1,BCR)/CONTROL BLD/T-LNRTO: >4.52 LOG
T(ABL1,BCR)P210 BLD/T QL: ABNORMAL
T(ABL1,BCR)P210/CONTROL (IS) BLD/T: <0.003 %

## 2025-03-06 ENCOUNTER — OFFICE VISIT (OUTPATIENT)
Facility: LOCATION | Age: 55
End: 2025-03-06
Payer: COMMERCIAL

## 2025-03-06 VITALS
TEMPERATURE: 98 F | HEART RATE: 78 BPM | OXYGEN SATURATION: 97 % | DIASTOLIC BLOOD PRESSURE: 76 MMHG | SYSTOLIC BLOOD PRESSURE: 155 MMHG

## 2025-03-06 DIAGNOSIS — K60.2 ANAL FISSURE: Primary | ICD-10-CM

## 2025-03-17 DIAGNOSIS — C92.11 BCR/ABL1-POSITIVE CHRONIC MYELOID LEUKEMIA (CML) IN REMISSION  (CMD): ICD-10-CM

## 2025-03-17 RX ORDER — IMATINIB MESYLATE 100 MG/1
400 TABLET, FILM COATED ORAL DAILY
Qty: 120 TABLET | Refills: 11 | Status: ACTIVE | OUTPATIENT
Start: 2025-03-17

## 2025-03-18 ENCOUNTER — LAB SERVICES (OUTPATIENT)
Dept: LAB | Age: 55
End: 2025-03-18
Attending: INTERNAL MEDICINE

## 2025-03-18 DIAGNOSIS — C92.11 BCR/ABL1-POSITIVE CHRONIC MYELOID LEUKEMIA (CML) IN REMISSION  (CMD): ICD-10-CM

## 2025-03-18 LAB
BASOPHILS # BLD: 0.1 K/MCL (ref 0–0.3)
BASOPHILS NFR BLD: 1 %
DEPRECATED RDW RBC: 49.4 FL (ref 39–50)
EOSINOPHIL # BLD: 1.5 K/MCL (ref 0–0.5)
EOSINOPHIL NFR BLD: 13 %
ERYTHROCYTE [DISTWIDTH] IN BLOOD: 13.7 % (ref 11–15)
HCT VFR BLD CALC: 31.2 % (ref 36–46.5)
HGB BLD-MCNC: 10.8 G/DL (ref 12–15.5)
IMM GRANULOCYTES # BLD AUTO: 0 K/MCL (ref 0–0.2)
IMM GRANULOCYTES # BLD: 0 %
LYMPHOCYTES # BLD: 1.4 K/MCL (ref 1–4)
LYMPHOCYTES NFR BLD: 13 %
MCH RBC QN AUTO: 33.9 PG (ref 26–34)
MCHC RBC AUTO-ENTMCNC: 34.6 G/DL (ref 32–36.5)
MCV RBC AUTO: 97.8 FL (ref 78–100)
MONOCYTES # BLD: 0.8 K/MCL (ref 0.3–0.9)
MONOCYTES NFR BLD: 7 %
NEUTROPHILS # BLD: 7.2 K/MCL (ref 1.8–7.7)
NEUTROPHILS NFR BLD: 66 %
NRBC BLD MANUAL-RTO: 0 /100 WBC
PLATELET # BLD AUTO: 232 K/MCL (ref 140–450)
RBC # BLD: 3.19 MIL/MCL (ref 4–5.2)
WBC # BLD: 10.9 K/MCL (ref 4.2–11)

## 2025-03-18 PROCEDURE — 81206 BCR/ABL1 GENE MAJOR BP: CPT

## 2025-03-18 PROCEDURE — 36415 COLL VENOUS BLD VENIPUNCTURE: CPT

## 2025-03-18 PROCEDURE — 85025 COMPLETE CBC W/AUTO DIFF WBC: CPT

## 2025-03-20 LAB
SERVICE CMNT-IMP: NORMAL
T(ABL1,BCR)P210 BLD/T QL: NOT DETECTED

## 2025-03-20 NOTE — PROGRESS NOTES
Follow Up Visit Note       Active Problems      1. Anal fissure          Chief Complaint   Chief Complaint   Patient presents with    Follow - Up     EP - anal fissure - feeling good. No pain. No blood with BM's.          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  She did well for about 6 months after this initial procedure but had return of more severe anorectal pain.  Examination revealed a persistent/recurrent posterior midline anal fissure.  Most recently, she underwent repeat perianal injection of Botox on 3/22/2024.  She returns for follow-up today.    She is doing well overall from an anorectal standpoint.  She has occasional rectal pain with bowel movements which is relieved by taking a sitz bath as needed.  She also continues to use the nifedipine/lidocaine ointment as needed.  She is taking a stool softener and generally has soft stool without constipation.  She denies any rectal bleeding, prolapse, seepage, itching or incontinence.    Allergies  Adriana is allergic to magnesium.    Past Medical / Surgical / Social / Family History    The past medical and past surgical history have been reviewed by me today.    Past Medical History:    Anal fissure    Anemia    Arrhythmia    A-FIB 2020- on metoprolol    Blood disorder    luekemia    Calculus of gallbladder without mention of cholecystitis or obstruction    Calculus of kidney    Cancer (HCC)    Chronic myeloid leukemia, without mention of having achieved remission    diagnosed 2003    CML (chronic myelocytic leukemia) (HCC)    Dysfunctional uterine bleeding    Dysmenorrhea    Essential hypertension    controlled with med    Essential hypertension, benign    Exposure to medical diagnostic radiation    12/23  oral radiation pill thyroid cancer    Gestational diabetes mellitus (HCC)    H/O mammogram    BENIGN  FINDINGS    High blood pressure    History of blood transfusion    History of recent hospitalization    X2 days at  - had abd pain, N/V low potassium and inflammation in Kidneys    Nausea and vomiting, unspecified vomiting type    Pap smear for cervical cancer screening    wnl neg hpv    Peptic ulcer    Personal history of antineoplastic chemotherapy    daily oral pills currently    Renal disorder    stage 3 kidney disease    Thyroid cancer (HCC)    Surgery- in march and now scheduled for 10/6/23 total thyroid completion surgery- no chemo / or radiation for thyroid cancer    Visual impairment    readers    [place] teeth extracted     Past Surgical History:   Procedure Laterality Date    Cholecystectomy      D & c  10/26/2012    Endometrial ablation  10/26/2012    d&c followed by novasure ablation    Hysteroscopy      Laparoscopic cholecystectomy  can remember date should be in file !    Dario, iud  09/25/2012    Other surgical history      Botox surgery for fissure 3/2023    Removal gallbladder  01/01/2005    Thyroidectomy      partial / left thyroid 8/23/2023 and rest, right, removed 10/06/2023, and transplant of parathryroids back in    Tonsillectomy      when i was a child       The family history and social history have been reviewed by me today.    Family History   Problem Relation Age of Onset    Cancer Self 34    Thyroid Cancer Self 53    Other (adrenal cancer) Mother     Other (abdominal cancer) Father         pancreatic bladder & liver    Cancer Maternal Grandfather     Heart Disease Paternal Grandmother     Stroke Paternal Grandfather     Heart Disease Sister      Social History     Socioeconomic History    Marital status:    Occupational History    Occupation: Homemaker    Tobacco Use    Smoking status: Never    Smokeless tobacco: Never   Vaping Use    Vaping status: Never Used   Substance and Sexual Activity    Alcohol use: Never    Drug use: Never    Sexual activity: Not Currently   Other  Topics Concern    Caffeine Concern No    Stress Concern No    Weight Concern No    Special Diet No    Exercise No    Seat Belt No    Self-Exams Yes     Comment: self breast exam 2 times a month        Current Outpatient Medications:     levothyroxine 112 MCG Oral Tab, Take 1 tablet (112 mcg total) by mouth before breakfast., Disp: , Rfl:     lisinopril 40 MG Oral Tab, Take 1 tablet (40 mg total) by mouth daily., Disp: 90 tablet, Rfl: 3    calcitriol 0.25 MCG Oral Cap, Take 1 capsule (0.25 mcg total) by mouth 2 (two) times daily., Disp: , Rfl:     NON FORMULARY, Nifedipine and lidocaine ointment to aa as directed, Disp: , Rfl:     Magnesium Cl-Calcium Carbonate (SLOW-MAG) 71.5-119 MG Oral Tab EC, Take 2 tablets by mouth daily., Disp: 60 tablet, Rfl: 0    docusate sodium 100 MG Oral Cap, Take 1 capsule (100 mg total) by mouth daily., Disp: , Rfl:     Metoprolol Succinate ER 25 MG Oral Tablet 24 Hr, Take 1 tablet (25 mg total) by mouth daily., Disp: 90 tablet, Rfl: 0    Aspirin 81 MG Oral Cap, Take 81 mg by mouth daily with lunch., Disp: 1 tablet, Rfl: 0    Imatinib Mesylate 100 MG Oral Tab, Take 3 tablets (300 mg total) by mouth in the morning and 3 tablets (300 mg total) before bedtime., Disp: , Rfl:     Multiple Vitamins-Minerals (CENTRUM OR), Take 1 capsule by mouth daily as needed., Disp: , Rfl:      Review of Systems  A 10 point review of systems was performed and negative unless otherwise documented per HPI.     Physical Findings   /76 (BP Location: Left arm, Patient Position: Sitting, Cuff Size: adult)   Pulse 78   Temp 98.1 °F (36.7 °C) (Temporal)   SpO2 97%   Physical Exam  Vitals and nursing note reviewed. Exam conducted with a chaperone present.   Constitutional:       General: She is not in acute distress.  HENT:      Head: Normocephalic and atraumatic.      Mouth/Throat:      Mouth: Mucous membranes are moist.   Cardiovascular:      Rate and Rhythm: Normal rate and regular rhythm.   Pulmonary:       Effort: Pulmonary effort is normal.   Abdominal:      General: There is no distension.      Palpations: Abdomen is soft.      Tenderness: There is no abdominal tenderness.   Genitourinary:     Comments: Patient examined in the prone jackknife position with female medical assistant chaperone present.  External exam of the anus reveals a superficial posterior midline anal fissure  Musculoskeletal:         General: No deformity.   Skin:     General: Skin is warm and dry.   Neurological:      General: No focal deficit present.      Mental Status: She is alert.   Psychiatric:         Mood and Affect: Mood normal.          Assessment   1. 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  She did well for about 6 months after this initial procedure but had return of more severe anorectal pain.  Examination revealed a persistent/recurrent posterior midline anal fissure.  Most recently, she underwent repeat perianal injection of Botox on 3/22/2024.  She returns for follow-up today.    She is doing well overall from an anorectal standpoint.  She has occasional rectal pain with bowel movements which is relieved by taking a sitz bath as needed.  She also continues to use the nifedipine/lidocaine ointment as needed.  She is taking a stool softener and generally has soft stool without constipation.  She denies any rectal bleeding, prolapse, seepage, itching or incontinence.    External exam of the anus reveals a superficial posterior midline anal fissure.    Plan   Continue bowel regimen as needed to prevent straining and constipation.  Continue topical lidocaine/nifedipine ointment as needed at this point.  I would like to see the patient back in 6 months time for ongoing follow-up and repeat anorectal examination.  Return precautions in the meantime discussed.  Patient expressed understanding  and was agreeable to plan.     No orders of the defined types were placed in this encounter.      Imaging & Referrals   None    Follow Up  No follow-ups on file.    Luan Herrera MD

## 2025-03-21 ENCOUNTER — E-ADVICE (OUTPATIENT)
Dept: HEMATOLOGY/ONCOLOGY | Age: 55
End: 2025-03-21

## 2025-03-21 DIAGNOSIS — C92.11 BCR/ABL1-POSITIVE CHRONIC MYELOID LEUKEMIA (CML) IN REMISSION  (CMD): Primary | ICD-10-CM

## 2025-03-31 ENCOUNTER — E-ADVICE (OUTPATIENT)
Dept: HEMATOLOGY/ONCOLOGY | Age: 55
End: 2025-03-31

## 2025-04-08 DIAGNOSIS — C92.11 BCR/ABL1-POSITIVE CHRONIC MYELOID LEUKEMIA (CML) IN REMISSION  (CMD): ICD-10-CM

## 2025-04-08 RX ORDER — IMATINIB MESYLATE 100 MG/1
200 TABLET, FILM COATED ORAL DAILY
Qty: 60 TABLET | Refills: 11 | Status: ACTIVE | OUTPATIENT
Start: 2025-04-08

## 2025-04-18 ENCOUNTER — LAB SERVICES (OUTPATIENT)
Dept: LAB | Age: 55
End: 2025-04-18
Attending: INTERNAL MEDICINE

## 2025-04-18 DIAGNOSIS — C92.11 BCR/ABL1-POSITIVE CHRONIC MYELOID LEUKEMIA (CML) IN REMISSION  (CMD): ICD-10-CM

## 2025-04-18 LAB
ALBUMIN SERPL-MCNC: 3.8 G/DL (ref 3.4–5)
ALBUMIN/GLOB SERPL: 1.2 {RATIO} (ref 1–2.4)
ALP SERPL-CCNC: 66 UNITS/L (ref 45–117)
ALT SERPL-CCNC: 16 UNITS/L
ANION GAP SERPL CALC-SCNC: 8 MMOL/L (ref 7–19)
AST SERPL-CCNC: 20 UNITS/L
BASOPHILS # BLD: 0.1 K/MCL (ref 0–0.3)
BASOPHILS NFR BLD: 1 %
BILIRUB SERPL-MCNC: 0.3 MG/DL (ref 0.2–1)
BUN SERPL-MCNC: 23 MG/DL (ref 6–20)
BUN/CREAT SERPL: 17 (ref 7–25)
CALCIUM SERPL-MCNC: 9.2 MG/DL (ref 8.4–10.2)
CHLORIDE SERPL-SCNC: 105 MMOL/L (ref 97–110)
CO2 SERPL-SCNC: 30 MMOL/L (ref 21–32)
CREAT SERPL-MCNC: 1.37 MG/DL (ref 0.51–0.95)
DEPRECATED RDW RBC: 45.6 FL (ref 39–50)
EGFRCR SERPLBLD CKD-EPI 2021: 45 ML/MIN/{1.73_M2}
EOSINOPHIL # BLD: 0.1 K/MCL (ref 0–0.5)
EOSINOPHIL NFR BLD: 1 %
ERYTHROCYTE [DISTWIDTH] IN BLOOD: 12.8 % (ref 11–15)
FASTING DURATION TIME PATIENT: ABNORMAL H
GLOBULIN SER-MCNC: 3.3 G/DL (ref 2–4)
GLUCOSE SERPL-MCNC: 109 MG/DL (ref 70–99)
HCT VFR BLD CALC: 34 % (ref 36–46.5)
HGB BLD-MCNC: 11.5 G/DL (ref 12–15.5)
IMM GRANULOCYTES # BLD AUTO: 0 K/MCL (ref 0–0.2)
IMM GRANULOCYTES # BLD: 0 %
LYMPHOCYTES # BLD: 1.3 K/MCL (ref 1–4)
LYMPHOCYTES NFR BLD: 12 %
MCH RBC QN AUTO: 32.6 PG (ref 26–34)
MCHC RBC AUTO-ENTMCNC: 33.8 G/DL (ref 32–36.5)
MCV RBC AUTO: 96.3 FL (ref 78–100)
MONOCYTES # BLD: 0.8 K/MCL (ref 0.3–0.9)
MONOCYTES NFR BLD: 7 %
NEUTROPHILS # BLD: 8.7 K/MCL (ref 1.8–7.7)
NEUTROPHILS NFR BLD: 79 %
NRBC BLD MANUAL-RTO: 0 /100 WBC
PLATELET # BLD AUTO: 255 K/MCL (ref 140–450)
POTASSIUM SERPL-SCNC: 3.7 MMOL/L (ref 3.4–5.1)
PROT SERPL-MCNC: 7.1 G/DL (ref 6.4–8.2)
RBC # BLD: 3.53 MIL/MCL (ref 4–5.2)
SODIUM SERPL-SCNC: 139 MMOL/L (ref 135–145)
WBC # BLD: 10.9 K/MCL (ref 4.2–11)

## 2025-04-18 PROCEDURE — 81206 BCR/ABL1 GENE MAJOR BP: CPT

## 2025-04-18 PROCEDURE — 36415 COLL VENOUS BLD VENIPUNCTURE: CPT

## 2025-04-18 PROCEDURE — 85025 COMPLETE CBC W/AUTO DIFF WBC: CPT

## 2025-04-18 PROCEDURE — 80053 COMPREHEN METABOLIC PANEL: CPT

## 2025-04-21 ENCOUNTER — E-ADVICE (OUTPATIENT)
Dept: HEMATOLOGY/ONCOLOGY | Age: 55
End: 2025-04-21

## 2025-04-21 LAB
SERVICE CMNT-IMP: NORMAL
T(ABL1,BCR)P210 BLD/T QL: NOT DETECTED

## 2025-04-29 ENCOUNTER — E-ADVICE (OUTPATIENT)
Dept: HEMATOLOGY/ONCOLOGY | Age: 55
End: 2025-04-29

## 2025-05-20 ENCOUNTER — OFFICE VISIT (OUTPATIENT)
Dept: HEMATOLOGY/ONCOLOGY | Age: 55
End: 2025-05-20
Attending: INTERNAL MEDICINE

## 2025-05-20 ENCOUNTER — LAB SERVICES (OUTPATIENT)
Dept: LAB | Age: 55
End: 2025-05-20
Attending: INTERNAL MEDICINE

## 2025-05-20 VITALS
HEART RATE: 80 BPM | BODY MASS INDEX: 29.22 KG/M2 | TEMPERATURE: 98.3 F | WEIGHT: 164.9 LBS | DIASTOLIC BLOOD PRESSURE: 78 MMHG | RESPIRATION RATE: 16 BRPM | SYSTOLIC BLOOD PRESSURE: 158 MMHG | HEIGHT: 63 IN | OXYGEN SATURATION: 96 %

## 2025-05-20 DIAGNOSIS — C92.11 BCR/ABL1-POSITIVE CHRONIC MYELOID LEUKEMIA (CML) IN REMISSION  (CMD): ICD-10-CM

## 2025-05-20 DIAGNOSIS — C92.11 BCR/ABL1-POSITIVE CHRONIC MYELOID LEUKEMIA (CML) IN REMISSION  (CMD): Primary | ICD-10-CM

## 2025-05-20 LAB
ALBUMIN SERPL-MCNC: 3.9 G/DL (ref 3.4–5)
ALBUMIN/GLOB SERPL: 1.2 {RATIO} (ref 1–2.4)
ALP SERPL-CCNC: 65 UNITS/L (ref 45–117)
ALT SERPL-CCNC: 19 UNITS/L
ANION GAP SERPL CALC-SCNC: 4 MMOL/L (ref 7–19)
AST SERPL-CCNC: 19 UNITS/L
BASOPHILS # BLD: 0.1 K/MCL (ref 0–0.3)
BASOPHILS NFR BLD: 0 %
BILIRUB SERPL-MCNC: 0.3 MG/DL (ref 0.2–1)
BUN SERPL-MCNC: 20 MG/DL (ref 6–20)
BUN/CREAT SERPL: 15 (ref 7–25)
CALCIUM SERPL-MCNC: 10 MG/DL (ref 8.4–10.2)
CHLORIDE SERPL-SCNC: 106 MMOL/L (ref 97–110)
CO2 SERPL-SCNC: 32 MMOL/L (ref 21–32)
CREAT SERPL-MCNC: 1.3 MG/DL (ref 0.51–0.95)
DEPRECATED RDW RBC: 42 FL (ref 39–50)
EGFRCR SERPLBLD CKD-EPI 2021: 48 ML/MIN/{1.73_M2}
EOSINOPHIL # BLD: 0.7 K/MCL (ref 0–0.5)
EOSINOPHIL NFR BLD: 6 %
ERYTHROCYTE [DISTWIDTH] IN BLOOD: 12 % (ref 11–15)
FASTING DURATION TIME PATIENT: ABNORMAL H
GLOBULIN SER-MCNC: 3.3 G/DL (ref 2–4)
GLUCOSE SERPL-MCNC: 134 MG/DL (ref 70–99)
HCT VFR BLD CALC: 37.7 % (ref 36–46.5)
HGB BLD-MCNC: 12.4 G/DL (ref 12–15.5)
IMM GRANULOCYTES # BLD AUTO: 0 K/MCL (ref 0–0.2)
IMM GRANULOCYTES # BLD: 0 %
LYMPHOCYTES # BLD: 1.3 K/MCL (ref 1–4)
LYMPHOCYTES NFR BLD: 11 %
MCH RBC QN AUTO: 31 PG (ref 26–34)
MCHC RBC AUTO-ENTMCNC: 32.9 G/DL (ref 32–36.5)
MCV RBC AUTO: 94.3 FL (ref 78–100)
MONOCYTES # BLD: 0.9 K/MCL (ref 0.3–0.9)
MONOCYTES NFR BLD: 7 %
NEUTROPHILS # BLD: 9 K/MCL (ref 1.8–7.7)
NEUTROPHILS NFR BLD: 76 %
NRBC BLD MANUAL-RTO: 0 /100 WBC
PLATELET # BLD AUTO: 259 K/MCL (ref 140–450)
POTASSIUM SERPL-SCNC: 3.7 MMOL/L (ref 3.4–5.1)
PROT SERPL-MCNC: 7.2 G/DL (ref 6.4–8.2)
RBC # BLD: 4 MIL/MCL (ref 4–5.2)
SODIUM SERPL-SCNC: 138 MMOL/L (ref 135–145)
WBC # BLD: 11.9 K/MCL (ref 4.2–11)

## 2025-05-20 PROCEDURE — 3077F SYST BP >= 140 MM HG: CPT | Performed by: INTERNAL MEDICINE

## 2025-05-20 PROCEDURE — 81206 BCR/ABL1 GENE MAJOR BP: CPT

## 2025-05-20 PROCEDURE — 99215 OFFICE O/P EST HI 40 MIN: CPT | Performed by: INTERNAL MEDICINE

## 2025-05-20 PROCEDURE — 99211 OFF/OP EST MAY X REQ PHY/QHP: CPT

## 2025-05-20 PROCEDURE — 85025 COMPLETE CBC W/AUTO DIFF WBC: CPT

## 2025-05-20 PROCEDURE — 3078F DIAST BP <80 MM HG: CPT | Performed by: INTERNAL MEDICINE

## 2025-05-20 PROCEDURE — 80053 COMPREHEN METABOLIC PANEL: CPT

## 2025-05-20 PROCEDURE — 36415 COLL VENOUS BLD VENIPUNCTURE: CPT

## 2025-05-20 ASSESSMENT — PATIENT HEALTH QUESTIONNAIRE - PHQ9: SUM OF ALL RESPONSES TO PHQ9 QUESTIONS 1 AND 2: 0

## 2025-05-20 ASSESSMENT — PAIN SCALES - GENERAL: PAINLEVEL_OUTOF10: 0

## 2025-05-22 ENCOUNTER — RESULTS FOLLOW-UP (OUTPATIENT)
Dept: HEMATOLOGY/ONCOLOGY | Age: 55
End: 2025-05-22

## 2025-05-22 LAB
SERVICE CMNT-IMP: NORMAL
T(ABL1,BCR)P210 BLD/T QL: NOT DETECTED

## 2025-06-17 ENCOUNTER — LAB SERVICES (OUTPATIENT)
Dept: LAB | Age: 55
End: 2025-06-17
Attending: INTERNAL MEDICINE

## 2025-06-17 ENCOUNTER — RESULTS FOLLOW-UP (OUTPATIENT)
Dept: HEMATOLOGY/ONCOLOGY | Age: 55
End: 2025-06-17

## 2025-06-17 DIAGNOSIS — C92.11 BCR/ABL1-POSITIVE CHRONIC MYELOID LEUKEMIA (CML) IN REMISSION  (CMD): ICD-10-CM

## 2025-06-17 LAB
ALBUMIN SERPL-MCNC: 3.9 G/DL (ref 3.4–5)
ALBUMIN/GLOB SERPL: 1.2 {RATIO} (ref 1–2.4)
ALP SERPL-CCNC: 68 UNITS/L (ref 45–117)
ALT SERPL-CCNC: 20 UNITS/L
ANION GAP SERPL CALC-SCNC: 6 MMOL/L (ref 7–19)
AST SERPL-CCNC: 19 UNITS/L
BASOPHILS # BLD: 0.1 K/MCL (ref 0–0.3)
BASOPHILS NFR BLD: 1 %
BILIRUB SERPL-MCNC: 0.5 MG/DL (ref 0.2–1)
BUN SERPL-MCNC: 24 MG/DL (ref 6–20)
BUN/CREAT SERPL: 18 (ref 7–25)
CALCIUM SERPL-MCNC: 10.1 MG/DL (ref 8.4–10.2)
CHLORIDE SERPL-SCNC: 104 MMOL/L (ref 97–110)
CO2 SERPL-SCNC: 32 MMOL/L (ref 21–32)
CREAT SERPL-MCNC: 1.32 MG/DL (ref 0.51–0.95)
DEPRECATED RDW RBC: 40.7 FL (ref 39–50)
EGFRCR SERPLBLD CKD-EPI 2021: 47 ML/MIN/{1.73_M2}
EOSINOPHIL # BLD: 0.1 K/MCL (ref 0–0.5)
EOSINOPHIL NFR BLD: 1 %
ERYTHROCYTE [DISTWIDTH] IN BLOOD: 12.2 % (ref 11–15)
FASTING DURATION TIME PATIENT: ABNORMAL H
GLOBULIN SER-MCNC: 3.2 G/DL (ref 2–4)
GLUCOSE SERPL-MCNC: 111 MG/DL (ref 70–99)
HCT VFR BLD CALC: 38.5 % (ref 36–46.5)
HGB BLD-MCNC: 12.8 G/DL (ref 12–15.5)
IMM GRANULOCYTES # BLD AUTO: 0 K/MCL (ref 0–0.2)
IMM GRANULOCYTES # BLD: 0 %
LYMPHOCYTES # BLD: 1.4 K/MCL (ref 1–4)
LYMPHOCYTES NFR BLD: 14 %
MCH RBC QN AUTO: 30.4 PG (ref 26–34)
MCHC RBC AUTO-ENTMCNC: 33.2 G/DL (ref 32–36.5)
MCV RBC AUTO: 91.4 FL (ref 78–100)
MONOCYTES # BLD: 0.8 K/MCL (ref 0.3–0.9)
MONOCYTES NFR BLD: 7 %
NEUTROPHILS # BLD: 7.9 K/MCL (ref 1.8–7.7)
NEUTROPHILS NFR BLD: 77 %
NRBC BLD MANUAL-RTO: 0 /100 WBC
PLATELET # BLD AUTO: 245 K/MCL (ref 140–450)
POTASSIUM SERPL-SCNC: 4.1 MMOL/L (ref 3.4–5.1)
PROT SERPL-MCNC: 7.1 G/DL (ref 6.4–8.2)
RBC # BLD: 4.21 MIL/MCL (ref 4–5.2)
SODIUM SERPL-SCNC: 138 MMOL/L (ref 135–145)
WBC # BLD: 10.2 K/MCL (ref 4.2–11)

## 2025-06-17 PROCEDURE — 36415 COLL VENOUS BLD VENIPUNCTURE: CPT

## 2025-06-17 PROCEDURE — 80053 COMPREHEN METABOLIC PANEL: CPT

## 2025-06-17 PROCEDURE — 81206 BCR/ABL1 GENE MAJOR BP: CPT

## 2025-06-17 PROCEDURE — 85025 COMPLETE CBC W/AUTO DIFF WBC: CPT

## 2025-06-18 LAB
SERVICE CMNT-IMP: NORMAL
T(ABL1,BCR)P210 BLD/T QL: NOT DETECTED

## 2025-07-15 ENCOUNTER — LAB SERVICES (OUTPATIENT)
Dept: LAB | Age: 55
End: 2025-07-15
Attending: INTERNAL MEDICINE

## 2025-07-15 DIAGNOSIS — C92.11 BCR/ABL1-POSITIVE CHRONIC MYELOID LEUKEMIA (CML) IN REMISSION  (CMD): ICD-10-CM

## 2025-07-15 LAB
ALBUMIN SERPL-MCNC: 3.8 G/DL (ref 3.4–5)
ALBUMIN/GLOB SERPL: 1.1 {RATIO} (ref 1–2.4)
ALP SERPL-CCNC: 80 UNITS/L (ref 45–117)
ALT SERPL-CCNC: 22 UNITS/L
ANION GAP SERPL CALC-SCNC: 8 MMOL/L (ref 7–19)
AST SERPL-CCNC: 23 UNITS/L
BASOPHILS # BLD: 0.1 K/MCL (ref 0–0.3)
BASOPHILS NFR BLD: 1 %
BILIRUB SERPL-MCNC: 0.4 MG/DL (ref 0.2–1)
BUN SERPL-MCNC: 19 MG/DL (ref 6–20)
BUN/CREAT SERPL: 15 (ref 7–25)
CALCIUM SERPL-MCNC: 10 MG/DL (ref 8.4–10.2)
CHLORIDE SERPL-SCNC: 103 MMOL/L (ref 97–110)
CO2 SERPL-SCNC: 32 MMOL/L (ref 21–32)
CREAT SERPL-MCNC: 1.26 MG/DL (ref 0.51–0.95)
DEPRECATED RDW RBC: 40.9 FL (ref 39–50)
EGFRCR SERPLBLD CKD-EPI 2021: 50 ML/MIN/{1.73_M2}
EOSINOPHIL # BLD: 0.1 K/MCL (ref 0–0.5)
EOSINOPHIL NFR BLD: 0 %
ERYTHROCYTE [DISTWIDTH] IN BLOOD: 12.4 % (ref 11–15)
FASTING DURATION TIME PATIENT: ABNORMAL H
GLOBULIN SER-MCNC: 3.4 G/DL (ref 2–4)
GLUCOSE SERPL-MCNC: 107 MG/DL (ref 70–99)
HCT VFR BLD CALC: 40.7 % (ref 36–46.5)
HGB BLD-MCNC: 13.5 G/DL (ref 12–15.5)
IMM GRANULOCYTES # BLD AUTO: 0 K/MCL (ref 0–0.2)
IMM GRANULOCYTES # BLD: 0 %
LYMPHOCYTES # BLD: 1.3 K/MCL (ref 1–4)
LYMPHOCYTES NFR BLD: 11 %
MCH RBC QN AUTO: 29.9 PG (ref 26–34)
MCHC RBC AUTO-ENTMCNC: 33.2 G/DL (ref 32–36.5)
MCV RBC AUTO: 90.2 FL (ref 78–100)
MONOCYTES # BLD: 0.7 K/MCL (ref 0.3–0.9)
MONOCYTES NFR BLD: 6 %
NEUTROPHILS # BLD: 9.2 K/MCL (ref 1.8–7.7)
NEUTROPHILS NFR BLD: 82 %
NRBC BLD MANUAL-RTO: 0 /100 WBC
PLATELET # BLD AUTO: 251 K/MCL (ref 140–450)
POTASSIUM SERPL-SCNC: 3.8 MMOL/L (ref 3.4–5.1)
PROT SERPL-MCNC: 7.2 G/DL (ref 6.4–8.2)
RBC # BLD: 4.51 MIL/MCL (ref 4–5.2)
SODIUM SERPL-SCNC: 139 MMOL/L (ref 135–145)
WBC # BLD: 11.4 K/MCL (ref 4.2–11)

## 2025-07-15 PROCEDURE — 85025 COMPLETE CBC W/AUTO DIFF WBC: CPT

## 2025-07-15 PROCEDURE — 81206 BCR/ABL1 GENE MAJOR BP: CPT

## 2025-07-15 PROCEDURE — 36415 COLL VENOUS BLD VENIPUNCTURE: CPT

## 2025-07-15 PROCEDURE — 80053 COMPREHEN METABOLIC PANEL: CPT

## 2025-07-17 LAB
SERVICE CMNT-IMP: NORMAL
T(ABL1,BCR)P210 BLD/T QL: NOT DETECTED

## 2025-08-11 DIAGNOSIS — C92.11 BCR/ABL1-POSITIVE CHRONIC MYELOID LEUKEMIA (CML) IN REMISSION  (CMD): Primary | ICD-10-CM

## 2025-08-12 ENCOUNTER — LAB SERVICES (OUTPATIENT)
Dept: LAB | Age: 55
End: 2025-08-12
Attending: INTERNAL MEDICINE

## 2025-08-13 ENCOUNTER — OFFICE VISIT (OUTPATIENT)
Dept: FAMILY MEDICINE CLINIC | Facility: CLINIC | Age: 55
End: 2025-08-13

## 2025-08-13 VITALS
WEIGHT: 160 LBS | RESPIRATION RATE: 18 BRPM | DIASTOLIC BLOOD PRESSURE: 80 MMHG | HEIGHT: 62.5 IN | BODY MASS INDEX: 28.71 KG/M2 | OXYGEN SATURATION: 97 % | HEART RATE: 80 BPM | SYSTOLIC BLOOD PRESSURE: 132 MMHG

## 2025-08-13 DIAGNOSIS — M79.641 PAIN OF RIGHT HAND: Primary | ICD-10-CM

## 2025-08-13 DIAGNOSIS — R22.31 LOCALIZED SWELLING ON RIGHT HAND: ICD-10-CM

## 2025-08-13 PROCEDURE — 3079F DIAST BP 80-89 MM HG: CPT | Performed by: NURSE PRACTITIONER

## 2025-08-13 PROCEDURE — 99213 OFFICE O/P EST LOW 20 MIN: CPT | Performed by: NURSE PRACTITIONER

## 2025-08-13 PROCEDURE — 3008F BODY MASS INDEX DOCD: CPT | Performed by: NURSE PRACTITIONER

## 2025-08-13 PROCEDURE — 3075F SYST BP GE 130 - 139MM HG: CPT | Performed by: NURSE PRACTITIONER

## 2025-08-13 RX ORDER — LIDOCAINE, MENTHOL 4.8; 1.2 G/120G; G/120G
GEL ORAL
COMMUNITY
Start: 2023-11-20

## 2025-08-13 RX ORDER — PREDNISONE 20 MG/1
40 TABLET ORAL DAILY
Qty: 10 TABLET | Refills: 0 | Status: SHIPPED | OUTPATIENT
Start: 2025-08-13

## 2025-08-21 ENCOUNTER — OFFICE VISIT (OUTPATIENT)
Dept: HEMATOLOGY/ONCOLOGY | Age: 55
End: 2025-08-21
Attending: INTERNAL MEDICINE

## 2025-08-21 VITALS
SYSTOLIC BLOOD PRESSURE: 144 MMHG | TEMPERATURE: 97.8 F | BODY MASS INDEX: 29.59 KG/M2 | WEIGHT: 167 LBS | RESPIRATION RATE: 16 BRPM | HEART RATE: 67 BPM | HEIGHT: 63 IN | DIASTOLIC BLOOD PRESSURE: 78 MMHG | OXYGEN SATURATION: 98 %

## 2025-08-21 DIAGNOSIS — C92.11 BCR/ABL1-POSITIVE CHRONIC MYELOID LEUKEMIA (CML) IN REMISSION  (CMD): Primary | ICD-10-CM

## 2025-08-21 PROCEDURE — 99211 OFF/OP EST MAY X REQ PHY/QHP: CPT

## 2025-08-21 ASSESSMENT — PAIN SCALES - GENERAL: PAINLEVEL_OUTOF10: 0

## 2025-08-21 ASSESSMENT — PATIENT HEALTH QUESTIONNAIRE - PHQ9: SUM OF ALL RESPONSES TO PHQ9 QUESTIONS 1 AND 2: 0

## (undated) DEVICE — SLEEVE KENDALL SCD EXPRESS MED

## (undated) DEVICE — SOLUTION IRRIG 1000ML 0.9% NACL USP BTL

## (undated) DEVICE — GAUZE STERILE 4X4 12PLY

## (undated) DEVICE — STERILE SYNTHETIC POLYISOPRENE POWDER-FREE SURGICAL GLOVES WITH HYDROGEL COATING, SMOOTH FINISH, STRAIGHT FINGER: Brand: PROTEXIS

## (undated) DEVICE — LIGACLIP EXTRA LIGATING CLIP CARTRIDGES: 6 TITANIUM CLIPS/ CARTRIDGE (SMALL): Brand: LIGACLIP

## (undated) DEVICE — GLOVE SUR 7.5 SENSICARE PI PIP GRN PWD F

## (undated) DEVICE — NEEDLE SPNL 22GA L3.5IN BLK QNCKE STYL DISP

## (undated) DEVICE — CABLE BIPOLAR 12FT DISPOSABLE

## (undated) DEVICE — YANKAUER,FLEXIBLE HANDLE,FINE CAPACITY: Brand: MEDLINE

## (undated) DEVICE — #11 STERILE BLADE: Brand: POLYMER COATED BLADES

## (undated) DEVICE — STERILE POLYISOPRENE POWDER-FREE SURGICAL GLOVES: Brand: PROTEXIS

## (undated) DEVICE — Device

## (undated) DEVICE — CLIP LIG M BLU TI HRT SHP WIRE HORZ

## (undated) DEVICE — ELECTRODE ES L2.75IN XLN STD BLDE MOD E-Z CLN

## (undated) DEVICE — STANDARD HYPODERMIC NEEDLE,POLYPROPYLENE HUB: Brand: MONOJECT

## (undated) DEVICE — HEMOSTAT ARISTA 1GRAM

## (undated) DEVICE — POWDER HEMSTAT 1GM PURIFIED PLNT STARCH ABSRB

## (undated) DEVICE — PAD INSTR W16XL10IN MAG ZONE DISP SAF-T PASS

## (undated) DEVICE — SPONGE: SPECIALTY PEANUT XR 100/CS: Brand: MEDICAL ACTION INDUSTRIES

## (undated) DEVICE — RECTAL CDS-LF: Brand: MEDLINE INDUSTRIES, INC.

## (undated) DEVICE — HOOK RETRCT BLDE L5MM E STAY BLNT LONE STAR

## (undated) DEVICE — HEAD AND NECK CDS-LF: Brand: MEDLINE INDUSTRIES, INC.

## (undated) DEVICE — PANTS KNIT WASHABLE 2/3XL

## (undated) DEVICE — SOL NACL IRRIG 0.9% 1000ML BTL

## (undated) DEVICE — SYRINGE LL 1ML LF STRL TIP

## (undated) DEVICE — NEEDLE SPINAL 22X3-1/2 BLK

## (undated) DEVICE — 3M™ STERI-STRIP™ REINFORCED ADHESIVE SKIN CLOSURES, R1547, 1/2 IN X 4 IN (12 MM X 100 MM), 6 STRIPS/ENVELOPE: Brand: 3M™ STERI-STRIP™

## (undated) DEVICE — GLOVE SUR 7 SENSICARE PI PIP CRM PWD F

## (undated) DEVICE — JELLY,LUBE,STERILE,FLIP TOP,TUBE,2-OZ: Brand: MEDLINE

## (undated) DEVICE — PAD SACRAL SPAN AID

## (undated) DEVICE — UNDERPANTS INCONT 2XL KNIT MAT

## (undated) DEVICE — BETADINE SOLUTION 8 OZ BTL

## (undated) DEVICE — PAD SACRAL PREMIUM 12X12X1

## (undated) DEVICE — MEDI-VAC SUCTION HANDLE REGULAR CAPACITY: Brand: CARDINAL HEALTH

## (undated) DEVICE — PENCIL TELESCOPE MEGADYNE SE

## (undated) DEVICE — SUTURE MCRYL SZ 4-0 L18IN ABSRB UD L19MM PS-2

## (undated) DEVICE — SYRINGE 10ML LL TIP

## (undated) DEVICE — PROBE 8225101 5PK STD PRASS FL TIP ROHS

## (undated) DEVICE — SUTURE VCRL SZ 3-0 L27IN ABSRB UD L26MM SH

## (undated) DEVICE — SYRINGE MED 10ML LL TIP W/O SFTY DISP

## (undated) DEVICE — RETRACT LONE STAR STAYS DULL

## (undated) DEVICE — AIRWAY ENDO  TRIVANTAGE 7MM

## (undated) DEVICE — SLEEVE COMPR M KNEE LEN SGL USE KENDALL SCD

## (undated) DEVICE — EMG TUBE 8229707 NIM TRIVANTAGE 7.0MM ID: Brand: NIM TRIVANTAGE™

## (undated) DEVICE — CABLE BPLR L12FT FLYING LD DISP

## (undated) DEVICE — SUCTION SARNS MICRO SUCKER

## (undated) DEVICE — HEMOCLIP HORIZON MED 002200

## (undated) DEVICE — PENCIL SMK EVAC L10FT MPLR BLDE JAW OPN

## (undated) DEVICE — SUT VICRYL 3-0 SH J416H

## (undated) DEVICE — GAUZE,SPONGE,4"X4",12PLY,STERILE,LF,2'S: Brand: MEDLINE

## (undated) DEVICE — MEGADYNE E-Z CLEAN BLADE 2.75"

## (undated) DEVICE — SLEEVE COMPR MD KNEE LEN SGL USE KENDALL SCD

## (undated) DEVICE — SUT MONOCRYL 4-0 PS-2 Y496G

## (undated) DEVICE — UNDYED BRAIDED (POLYGLACTIN 910), SYNTHETIC ABSORBABLE SUTURE: Brand: COATED VICRYL

## (undated) NOTE — LETTER
To:  Dr Gabriela Montiel NP   Date:  2/23/2023  Fax #:    Patient Name: Chris Hamlin / Sex: 7/9/1970-A: 45 y  female  Phone:   CSN: [id number]     Medical Records: [id number]    Clearance for Surgery Requested by Surgeon    Request for:  Medical Clearance-  Seen on 2/14/23 w Bushra Dimas NP but medical clearance not noted in office notes - Thanks    Requested by Surgeon: Dr Jarrell Ridley    Surgical Date: 3/3/2023    Procedure: Exam under anesthesia with anoscopy,possible perianal injection of botox,possible lateral internal sphincterotomy    Please fax the clearance note to the [place] [phone]. Thank you.  Ivan Davis RN

## (undated) NOTE — LETTER
CLARIFICATION FOR E-SSS    To: Dr Garcia/ Surgical schedulers    Patient Name: Shun GARCIA-Age / Sex: 1970-A: 52 y  female   Medical Records: [id number] [facility]: [id number]      Procedure Description:  LOCALIZED SWELLING, MASS AND LUMP, NECK,LEFT THYROID LOBECTOMY, TOTAL THYROIDECTOMY    Please note that clarification is needed on the Electronic-Surgery Scheduling Sheet (E-SSS)  the original is included with this letter. Please have physician review and make changes on the faxed copy of the E-SSS. Procedure per patient should be : LEFT THYROID LOBECTOMY, POSSIBLE TOTAL THYROIDECTOMY    (Procedure name missing the word \"possible\")    Please review and submit change if needed        ALL CHANGES MUST BE DOCUMENTED ON THE E-SSS AND SIGNED BY THE PHYSICIAN    After physician has made the changes and signed the E-SSS please fax it to [phone] and these changes will then be made in Epic by the OR schedulers.      If you have any questions please call Pre-Admission Testing at [phone]    Thank you

## (undated) NOTE — LETTER
24    Patient: Adriana Pearson  : 1970 Visit date: 2024    Dear  Sam Deleon MD    Thank you for referring Adriana Pearson to my practice.  Please find my assessment and plan below.    Assessment   1. 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  She returns for ongoing follow-up today.  Patient has underwent treatment for thyroid cancer including 2 surgeries and adjuvant radioactive iodine therapy.  She continues to have intermittent anorectal pain.  The pain flared up before [place].  She is currently taking sitz bath's and using Calmoseptine to control pain.  She has been taking Colace lately and taking calcium supplements that have caused constipation.  She does admit to significant pain relief after the Botox procedure that lasted for around 6 months.  She denies any fecal incontinence.  She does have a history of a vaginal delivery with baby weighing 8 pounds and she believes she had an episiotomy.    External exam of the anus reveals a chronic appearing posterior midline fissure with exposed sphincter fibers at the base.  Gentle digital rectal exam with fifth digit confirms hypertonic sphincter tone.    Plan   Treatment options again discussed with patient.  We can certainly start with topical medical therapy using nifedipine/lidocaine ointment.  I did prescribe this through a compound pharmacy.  Patient has not had significant symptom relief with this in the past.  Surgical options include perianal injection of Botox versus lateral internal sphincterotomy.  The details of both these procedures were discussed including the expected recovery time, risk, benefits and alternatives.  Patient is reluctant to consider sphincterotomy due to concern for incontinence.  She is interested in repeat perianal injection of Botox.  This has been scheduled for  3/22/2024 pending medical clearance.       Sincerely,       Luan Herrera MD   CC:   No Recipients

## (undated) NOTE — LETTER
Your patient was recently seen at the [facility] for a hospital follow-up visit. The visit note is attached. Please contact the clinic with any questions at [phone].     Thank you,  Sierra Perez NP

## (undated) NOTE — LETTER
22    Patient: Javi Khalil  : 1970 Visit date: 2022    Dear  Zoe Olmedo MD    Thank you for referring Javi Khalil to my practice. Please find my assessment and plan below. Assessment   Anal fissure  (primary encounter diagnosis)    This is a very nice 64-year-old female who was referred to me with concern for an anal fissure. Bedside exam does indeed confirm the presence of an obvious posterior midline anal fissure. Plan  I recommend patient continues to avoid hard stools with straining and constipation by taking a stool softener. I also recommend she starts with topical treatment using nifedipine ointment BID-TID. I have prescribed a lidocaine-nifedipine ointment through a mail order compound pharmacy. I will follow-up with the patient in 1 month. If the fissure does not heal with this initial therapy, I will schedule her for examine under anesthesia with anoscopy and perianal Botox injection. Patient expressed understanding is agreeable to plan.       Sincerely,       Lucina Cutler MD   CC: No Recipients

## (undated) NOTE — LETTER
OUTSIDE TESTING RESULT REQUEST     IMPORTANT: FOR YOUR IMMEDIATE ATTENTION  Please FAX all test results listed below to: [phone]     Testing already done on or about: 8/3/23      * * * * If testing is NOT complete, arrange with patient A.S.A.P. * * * *      Patient Name: Tomas Herrmann  Surgery Date: 2023  Medical Record: [id number]  CSN: [id number]  : 1970 - A: 52 y     Sex: female  Surgeon(s):  Je Locke MD  Procedure: LOCALIZED SWELLING, MASS AND LUMP, NECK,LEFT THYROID LOBECTOMY, TOTAL THYROIDECTOMY  Anesthesia Type: General     Surgeon: Je Locke MD     The following Testing and Time Line are REQUIRED PER ANESTHESIA     EKG READ AND SIGNED WITHIN   90 days      Thank You,   Sent by:SANDY Chowdhury

## (undated) NOTE — LETTER
Luan Herrera MD    Surgical Clearance Needed    Date: 1/29/2024                                                                       From: Sandy    Attn: Dr. Deleon                                                                                    RE: Surgical Clearance               # of Pages: 1        Patient Name: Adriana Pearson    Patient YOB: 1970    Consult For: Medical Clearance    Surgery: Anal exam under anesthesia, perianal injection of Botox    Date of Surgery: 3/22/24 at [facility]         This facsimile transmission is provided for your internal use only. If the reader of this message is not the intended recipient, you are hereby notified that you have received this document in error, and that any review, dissemination, distribution, or copying of this message is strictly prohibited. If you have received this communication in error, please notify us immediately by telephone and return the original message to us by mail.

## (undated) NOTE — LETTER
To:  DR GALINDO  Date:  2024  Patient Name: Adriana Pearson-Age / Sex: 1970-A: 53 y  female  Medical Records: [id number]   CSN: [id number]      Clearance for Surgery Requested by Surgeon    Request for:  Medical Clearance    Requested by Surgeon: DR CLARK    Surgical Date: 3/22/2024    Procedure: ANAL EXAMINATION UNDER ANESTHESIA, PERIANAL INJECTION OF BOTOX, N/A      Please fax the clearance note to the Pre-Admission Testing department.  Thank you.

## (undated) NOTE — LETTER
[address] [facility], [address] [facility], [address] [facility], [address] [place] [address] 121     5/7/2021    [address]  [facility] [id number]    7/9/1970      Dear Leon Davis,    Our records indicate

## (undated) NOTE — MR AVS SNAPSHOT
[facility]  [place],  [address] [place] [address] [phone]               Thank you for choosing us for your health care visit with Sue Jaquez MD.  We are glad to serve you and happy to provide you with this summa Imaging:  MARINO SCREENING BILAT (ALT=[id number])    Instructions: To schedule an appointment for your radiology test please call [facility] Scheduling at [phone].          Reason for Today's Visit     Blood Pressure           Medical Issues Julianna Steve If you've recently had a stay at the Hospital you can access your discharge instructions in [email or web address] by going to Visits < Admission Summaries.  If you've been to the Emergency Department or your doctor's office, you can view your past visit information in My

## (undated) NOTE — LETTER
OUTSIDE TESTING RESULT REQUEST     IMPORTANT: FOR YOUR IMMEDIATE ATTENTION  Please FAX all test results listed below to: [phone]     Testing already done on or about: ASAP     * * * * If testing is NOT complete, arrange with patient A.S.A.P. * * * *      Patient Name: Adriana Pearson  Surgery Date: 3/22/2024  Medical Record: [id number]  CSN: [id number]  : 1970 - A: 53 y     Sex: female  Surgeon(s):  Luan Herrera MD  Procedure: ANAL EXAMINATION UNDER ANESTHESIA, PERIANAL INJECTION OF BOTOX  Anesthesia Type: General     Surgeon: Luan Herrera MD     The following Testing and Time Line are REQUIRED PER ANESTHESIA     EKG READ AND SIGNED WITHIN   90 days  CBC, Platelet; NO Differential within  60 days  CMP (requires 4 hour fast) within  60 days      Thank You,   Sent by:TIMMY

## (undated) NOTE — LETTER
Surgical Clearance Needed    Date: 2/16/2023                                                                       From: Maria Teresa Joaquin     Attn: DR GALINDO                                                                                Fax:     RE: Surgical Clearance               # of Pages: 1        Patient Name: Justyn Calderon    Patient YOB: 1970    Consult For: MEDICAL CLEARANCE    Surgery: EUA WITH ANOSCOPY, POSSIBLE PERIANAL INJECTION OF BOTOX, POSSIBLE LATERAL INTERNAL SPINCTEROTOMY    Date of Surgery: [facility] 3, 2023 AT [facility]        This facsimile transmission is provided for your internal use only. If the reader of this message is not the intended recipient, you are hereby notified that you have received this document in error, and that any review, dissemination, distribution, or copying of this message is strictly prohibited. If you have received this communication in error, please notify us immediately by telephone and return the original message to us by mail.